# Patient Record
Sex: FEMALE | Race: WHITE | NOT HISPANIC OR LATINO | Employment: OTHER | URBAN - METROPOLITAN AREA
[De-identification: names, ages, dates, MRNs, and addresses within clinical notes are randomized per-mention and may not be internally consistent; named-entity substitution may affect disease eponyms.]

---

## 2017-01-06 DIAGNOSIS — N95.1 SYMPTOMATIC MENOPAUSAL OR FEMALE CLIMACTERIC STATES: Primary | ICD-10-CM

## 2017-01-06 NOTE — TELEPHONE ENCOUNTER
PROGESTERONE 100MG/G CREAM      Last Written Prescription Date: 06/20/16  Last Fill Quantity: 45g, # refills: 3  Last Office Visit with [facility], [facility] or [facility] prescribing provider: 06/20/16     BP Readings from Last 3 Encounters:   06/20/16 86/54   05/27/15 104/70   05/09/14 102/62     Date of last Breast Exam:

## 2017-01-06 NOTE — TELEPHONE ENCOUNTER
Routing refill request to provider for review/approval because:  BP out of range    Drug not on refill protocol

## 2017-02-07 ENCOUNTER — TELEPHONE (OUTPATIENT)
Dept: INTERNAL MEDICINE | Facility: CLINIC | Age: 62
End: 2017-02-07

## 2017-02-07 NOTE — TELEPHONE ENCOUNTER
I cannot say what is causing this problem without seeing her.    Based on the limited information below, it sounds like what a hemorrhoid or fissure would cause.  I can see her tommorrow morning at 800 if that works, otherwise use one of the MD or Same Day spots.   I cannot see her after 300 pm because I have to leave right away after the patient at 300 pm.

## 2017-02-07 NOTE — TELEPHONE ENCOUNTER
"Danika Hyman is a 61 year old female who calls with complaint of blood coating stool x2 today.    NURSING ASSESSMENT:  Description:  Had 2 formed stools today that appeared to be covered in bright red blood  Onset/duration:  today  Precip. factors:  Patient has had \"acid indigestion\" x 2 wks intermittently, abdominal fullness, burning.  Has been taking her Ranitidine as ordered twice daily.  Associated symptoms:  Patient denies black or burgundy stools, weakness, fatigue, dizziness, SOB.  Denies use of ASA/ibuprofen/naproxen .  Patient is due for colonoscopy this year, last one 2007 normal.  Improves/worsens symptoms:  nothing  Pain scale (0-10)   0/10  LMP/preg/breast feeding:  NA  Last exam/Treatment:  6/20/16  Allergies:   Allergies   Allergen Reactions     Codeine      Vomiting       MEDICATIONS:   Taking medication(s) as prescribed? Yes  Taking over the counter medication(s?) No  Any medication side effects? No significant side effects    Any barriers to taking medication(s) as prescribed?  No  Medication(s) improving/managing symptoms?  N/A  Medication reconciliation completed: Yes      NURSING PLAN: Routed to provider Yes    RECOMMENDED DISPOSITION:  Appt scheduled to see PMD within 48 hours with instructions to avoid alcohol, acidic foods, anti inflammatory meds.  Patient advised to stick to bland diet and to be seen in ER for increase in bleeding, black or burgundy stools, weakness, lightheadedness, SOB  Will comply with recommendation: Yes  If further questions/concerns or if symptoms do not improve, worsen or new symptoms develop, call your PCP or [facility] as soon as possible.      Guideline used:  Telephone Triage Protocols for Nurses, Fifth Edition, Evelin Chapa RN      "

## 2017-02-08 ENCOUNTER — OFFICE VISIT (OUTPATIENT)
Dept: INTERNAL MEDICINE | Facility: CLINIC | Age: 62
End: 2017-02-08
Payer: COMMERCIAL

## 2017-02-08 VITALS
TEMPERATURE: 97.6 F | SYSTOLIC BLOOD PRESSURE: 112 MMHG | RESPIRATION RATE: 15 BRPM | HEART RATE: 59 BPM | WEIGHT: 129.8 LBS | DIASTOLIC BLOOD PRESSURE: 68 MMHG | OXYGEN SATURATION: 99 % | BODY MASS INDEX: 22.27 KG/M2

## 2017-02-08 DIAGNOSIS — K62.5 BRBPR (BRIGHT RED BLOOD PER RECTUM): Primary | ICD-10-CM

## 2017-02-08 DIAGNOSIS — Z12.11 SCREENING FOR COLON CANCER: ICD-10-CM

## 2017-02-08 DIAGNOSIS — Z23 NEED FOR PROPHYLACTIC VACCINATION AND INOCULATION AGAINST INFLUENZA: ICD-10-CM

## 2017-02-08 DIAGNOSIS — Z12.31 VISIT FOR SCREENING MAMMOGRAM: ICD-10-CM

## 2017-02-08 PROCEDURE — 99213 OFFICE O/P EST LOW 20 MIN: CPT | Performed by: INTERNAL MEDICINE

## 2017-02-08 NOTE — PROGRESS NOTES
SUBJECTIVE:                                                    Danika Hyman is a 61 year old female who presents to clinic today for the following health issues:      Concern - blood in stool     Onset: 1 day    Description:   Patient states that yesterday she noticed bright red blood in her stool, not when she wipes. She states that she has no rectal pain, no abdominal pain.  No blood on toilet patper or stool this morning.      Intensity: mild    Progression of Symptoms:  improving    Accompanying Signs & Symptoms:  See above; did have a burning pain possibly from acid reflux, also a bloated feeling    Last colonoscopy 2007 was within normal limits        Previous history of similar problem:   Yes-hemorrhoids in the past    Precipitating factors:   Worsened by: n/a    Alleviating factors:  Improved by: n/a       Therapies Tried and outcome: n/a          Problem list and histories reviewed & adjusted, as indicated.  Additional history: as documented          Past Medical History:  ---------------------------  Past Medical History   Diagnosis Date     Esophageal reflux      Status post bariatric surgery 1982     vertical banded gastrosplasty at [facility]       Past Surgical History:  ---------------------------  Past Surgical History   Procedure Laterality Date     C nonspecific procedure       wisdom teeth extraction,abstracted     C nonspecific procedure       T&A,abstracted     C nonspecific procedure       2 natural childbirths,abstracted     C nonspecific procedure  1982     gastric stapling ast [facility]     Cataract iol, rt/lt  09/2016       Current Medications:  ---------------------------  Current Outpatient Prescriptions   Medication Sig Dispense Refill     PROGESTERONE 100MG/G CREAM Apply 0.5 g topically once per day onto the skin 45 g 3     ranitidine (ZANTAC) 150 MG tablet Take 1 tablet (150 mg) by mouth 2 times daily 180 tablet 2     Estradiol Cypionate POWD 0.5 mg daily Apply cream 0.5% to forearm once  daily 45 g 5     ESTRADIOL 0.1MG/GM CREAM Place 1 g vaginally once a week 42.5 g 3     omega 3 1000 MG CAPS Take 1 g by mouth daily       Progesterone Micronized 5 % CREA Place 0.25 tsp onto the skin daily. 30 g 5     ANUSOL-HC 2.5 % RE CREA apply B.I.D. or T.I.D. 1 Tube 4     CENTRUM SILVER OR 1 tablet daily       OCUVITE EXTRA OR 1 TABLET DAILY       VITAMIN B-12 500 MCG OR TABS 1 sublingual qday 30 5     CALCIUM PLUS TABS   OR 2 tabs q day       zolpidem (AMBIEN) 5 MG tablet Take 0.5-1 tablets (2.5-5 mg) by mouth nightly as needed for sleep 30 tablet 1       Allergies:  -------------  Allergies   Allergen Reactions     Codeine      Vomiting       Social History:  -------------------  Social History     Social History     Marital status:      Spouse name: N/A     Number of children: N/A     Years of education: N/A     Occupational History           Social History Main Topics     Smoking status: Never Smoker     Smokeless tobacco: Never Used     Alcohol use Yes      Comment: 4 glasses a week     Drug use: No     Sexual activity: Yes     Partners: Male     Other Topics Concern     Not on file     Social History Narrative       Family Medical History:  ------------------------------  Family History   Problem Relation Age of Onset     Hypertension Mother      Alcohol/Drug Mother      B:192 alive     Arthritis Father      B:       DIABETES Father      Respiratory Father      Cardiovascular Father      Family History Negative Sister      Family History Negative Sister      Family History Negative Brother      Lung Cancer Mother 89     lung cancer ;  age 91         ROS:  REVIEW OF SYSTEMS:    RESP: negative for cough, dyspnea, wheezing, hemoptysis  CV: negative for chest pain, palpitations, PND, MONIQUE, orthopnea; reports no changes in their ability to perform physical activity (from cardiovascular standpoint)  GI: negative for dysphagia, N/V, pain, melena, diarrhea and constipation  NEURO:  negative for numbness/tingling, paralysis, incoordination, or focal weakness     OBJECTIVE:                                                    /68  Pulse 59  Temp 97.6  F (36.4  C) (Oral)  Resp 15  Wt 129 lb 12.8 oz (58.9 kg)  LMP 04/17/2009  SpO2 99%  BMI 22.28 kg/m2     Exam deffered  Offered rectal exam and anoscopy, but patient declined.          ASSESSMENT/PLAN:                                                      (K62.5) BRBPR (bright red blood per rectum)  (primary encounter diagnosis)  Comment: based on description of sx, suspect hemorrhoid vs fissure  Has colonoscopy within normal limits in 2007.   Will send for colonoscopy anyway.   Plan:     (Z12.31) Visit for screening mammogram  Comment:   Plan: MA SCREENING DIGITAL BILAT - Future  (s+30)            (Z23) Need for prophylactic vaccination and inoculation against influenza  Comment:   Plan:     (Z12.11) Screening for colon cancer  Comment: due for colonoscopy, last one normal in 2007  Plan: GASTROENTEROLOGY ADULT REF PROCEDURE ONLY              See Patient Instructions    WES RING M.D., MD  [facility]       Spent greater than 15 minutes with pt , greater than 50% of time was educational and counseling.

## 2017-02-08 NOTE — PATIENT INSTRUCTIONS
"Anal Fissure or Internal Hemmrhoid:     *  Colonoscopy at [facility]  [place] [phone] (fax [phone])  , you should receive a call to schedule your colonoscopy this spring.     *  A small \"split\" in the anal canal, almost always a self limited event, healing within days.       *  No specific treatment needed no specific further testing needed unless the symptoms do not get better or if the symptoms change or worsen.      *  Normalize the bowel patterns by increasing the intake of fiber and maintaining adequate water intake (at least 6-8 glasses per day).    *  Take the fiber supplement of your choice:  Capsules, tablets, chewable tablets, powders, etc.   Find one that works best for you and take it regularly.    *  If the stools are consistently harder, then consider a stool softner such as colace (docusate) or senekot to help make the BMs larger and softer so they pass easier.      *  Preparation H (suppositories or cream) or similar over the counter product to help sooth these irritations.    *  Consider using Preparation H wipes after passing BMs as needed.  Do not overcleanse the rectal area as this will lead to more rectal irritation.     *  Sometimes sitting in a bathtub with Epsom salts for 10-15 minutes can help hemorrhoids as well.     *  If you continue to have troubles, then we may need to send you for further testing, but nothing specifically needed now.     "

## 2017-02-08 NOTE — NURSING NOTE
"Chief Complaint   Patient presents with     Rectal Problem       Initial /68 mmHg  Pulse 59  Temp(Src) 97.6  F (36.4  C) (Oral)  Resp 15  Wt 129 lb 12.8 oz (58.877 kg)  SpO2 99%  LMP 04/17/2009 Estimated body mass index is 22.27 kg/(m^2) as calculated from the following:    Height as of 6/20/16: 5' 4\" (1.626 m).    Weight as of this encounter: 129 lb 12.8 oz (58.877 kg).  Medication Reconciliation: complete    "

## 2017-02-08 NOTE — MR AVS SNAPSHOT
"              After Visit Summary   2/8/2017    Danika Hyman    MRN: [id number]           Patient Information     Date Of Birth          1955        Visit Information        Provider Department      2/8/2017 8:00 AM Rupesh Jacobson MD [facility]        Today's Diagnoses     BRBPR (bright red blood per rectum)    -  1     Visit for screening mammogram         Need for prophylactic vaccination and inoculation against influenza         Screening for colon cancer           Care Instructions    Anal Fissure or Internal Hemmrhoid:     *  Colonoscopy at [facility]  [place] [phone] (fax [phone])  , you should receive a call to schedule your colonoscopy this spring.     *  A small \"split\" in the anal canal, almost always a self limited event, healing within days.       *  No specific treatment needed no specific further testing needed unless the symptoms do not get better or if the symptoms change or worsen.      *  Normalize the bowel patterns by increasing the intake of fiber and maintaining adequate water intake (at least 6-8 glasses per day).    *  Take the fiber supplement of your choice:  Capsules, tablets, chewable tablets, powders, etc.   Find one that works best for you and take it regularly.    *  If the stools are consistently harder, then consider a stool softner such as colace (docusate) or senekot to help make the BMs larger and softer so they pass easier.      *  Preparation H (suppositories or cream) or similar over the counter product to help sooth these irritations.    *  Consider using Preparation H wipes after passing BMs as needed.  Do not overcleanse the rectal area as this will lead to more rectal irritation.     *  Sometimes sitting in a bathtub with Epsom salts for 10-15 minutes can help hemorrhoids as well.     *  If you continue to have troubles, then we may need to send you for further testing, but nothing specifically needed now. "           Follow-ups after your visit        Additional Services     GASTROENTEROLOGY ADULT REF PROCEDURE ONLY       Last Lab Result: CREATININE (mg/dL)       Date                     Value                 06/20/2016               0.60             ----------  Body mass index is 22.27 kg/(m^2).     Needed:  No  Language:  English    Patient will be contacted to schedule procedure.     Please be aware that coverage of these services is subject to the terms and limitations of your health insurance plan.  Call member services at your health plan with any benefit or coverage questions.  Any procedures must be performed at a [facility] facility OR coordinated by your clinic's referral office.    Please bring the following with you to your appointment:    (1) Any X-Rays, CTs or MRIs which have been performed.  Contact the facility where they were done to arrange for  prior to your scheduled appointment.    (2) List of current medications   (3) This referral request   (4) Any documents/labs given to you for this referral                  Future tests that were ordered for you today     Open Future Orders        Priority Expected Expires Ordered    MA SCREENING DIGITAL BILAT - Future  (s+30) Routine  2/8/2018 2/8/2017            Who to contact     If you have questions or need follow up information about today's clinic visit or your schedule please contact [facility] directly at [phone].  Normal or non-critical lab and imaging results will be communicated to you by MyChart, letter or phone within 4 business days after the clinic has received the results. If you do not hear from us within 7 days, please contact the clinic through MyChart or phone. If you have a critical or abnormal lab result, we will notify you by phone as soon as possible.  Submit refill requests through [employer] or call your pharmacy and they will forward the refill request to us. Please allow 3 business days  for your refill to be completed.          Additional Information About Your Visit        [employer] Information     [employer] gives you secure access to your electronic health record. If you see a primary care provider, you can also send messages to your care team and make appointments. If you have questions, please call your primary care clinic.  If you do not have a primary care provider, please call [phone] and they will assist you.        Care EveryWhere ID     This is your Care EveryWhere ID. This could be used by other organizations to access your [place] medical records  [id number]        Your Vitals Were     Pulse Temperature Respirations Pulse Oximetry Last Period       59 97.6  F (36.4  C) (Oral) 15 99% 04/17/2009        Blood Pressure from Last 3 Encounters:   02/08/17 112/68   06/20/16 86/54   05/27/15 104/70    Weight from Last 3 Encounters:   02/08/17 129 lb 12.8 oz (58.877 kg)   06/20/16 127 lb 14.4 oz (58.015 kg)   05/27/15 121 lb 9.6 oz (55.157 kg)              We Performed the Following     GASTROENTEROLOGY ADULT REF PROCEDURE ONLY        Primary Care Provider Office Phone # Fax #    Rupesh Jacobson [phone]       [facility] [address] [place] [id number]        Thank you!     Thank you for choosing [facility]  for your care. Our goal is always to provide you with excellent care. Hearing back from our patients is one way we can continue to improve our services. Please take a few minutes to complete the written survey that you may receive in the mail after your visit with us. Thank you!             Your Updated Medication List - Protect others around you: Learn how to safely use, store and throw away your medicines at www.disposemymeds.org.          This list is accurate as of: 2/8/17  8:35 AM.  Always use your most recent med list.                   Brand Name Dispense Instructions for use    ANUSOL-HC 2.5 % cream   Generic  drug:  hydrocortisone     1 Tube    apply B.I.D. or T.I.D.       CALCIUM PLUS TABS   OR      2 tabs q day       cyanocolbalamin 500 MCG tablet    vitamin  B-12    30    1 sublingual qday       ESTRADIOL 0.1MG/GM CREAM     42.5 g    Place 1 g vaginally once a week       Estradiol Cypionate Powd     45 g    0.5 mg daily Apply cream 0.5% to forearm once daily       * OCUVITE EXTRA PO      1 TABLET DAILY       * CENTRUM SILVER PO      1 tablet daily       omega 3 1000 MG Caps      Take 1 g by mouth daily       PROGESTERONE 100MG/G CREAM     45 g    Apply 0.5 g topically once per day onto the skin       Progesterone Micronized 5 % Crea     30 g    Place 0.25 tsp onto the skin daily.       ranitidine 150 MG tablet    ZANTAC    180 tablet    Take 1 tablet (150 mg) by mouth 2 times daily       zolpidem 5 MG tablet    AMBIEN    30 tablet    Take 0.5-1 tablets (2.5-5 mg) by mouth nightly as needed for sleep       * Notice:  This list has 2 medication(s) that are the same as other medications prescribed for you. Read the directions carefully, and ask your doctor or other care provider to review them with you.

## 2017-02-25 ENCOUNTER — RADIANT APPOINTMENT (OUTPATIENT)
Dept: MAMMOGRAPHY | Facility: CLINIC | Age: 62
End: 2017-02-25
Attending: INTERNAL MEDICINE
Payer: COMMERCIAL

## 2017-02-25 DIAGNOSIS — Z12.31 VISIT FOR SCREENING MAMMOGRAM: ICD-10-CM

## 2017-02-25 PROCEDURE — G0202 SCR MAMMO BI INCL CAD: HCPCS | Mod: TC

## 2017-03-22 ENCOUNTER — OFFICE VISIT (OUTPATIENT)
Dept: OBGYN | Facility: CLINIC | Age: 62
End: 2017-03-22
Payer: COMMERCIAL

## 2017-03-22 VITALS
WEIGHT: 129 LBS | DIASTOLIC BLOOD PRESSURE: 60 MMHG | HEART RATE: 68 BPM | SYSTOLIC BLOOD PRESSURE: 96 MMHG | BODY MASS INDEX: 22.14 KG/M2

## 2017-03-22 DIAGNOSIS — R68.82 DECREASED LIBIDO: ICD-10-CM

## 2017-03-22 DIAGNOSIS — N95.1 SYMPTOMATIC MENOPAUSAL OR FEMALE CLIMACTERIC STATES: Primary | ICD-10-CM

## 2017-03-22 PROCEDURE — 99201 ZZC OFFICE/OUTPT VISIT, NEW, LEVEL I: CPT | Performed by: OBSTETRICS & GYNECOLOGY

## 2017-03-22 RX ORDER — ESTRADIOL 10 UG/1
10 INSERT VAGINAL
Qty: 12 TABLET | Refills: 3 | Status: SHIPPED | OUTPATIENT
Start: 2017-03-23 | End: 2017-04-14

## 2017-03-22 NOTE — MR AVS SNAPSHOT
After Visit Summary   3/22/2017    Danika Hyman    MRN: [id number]           Patient Information     Date Of Birth          1955        Visit Information        Provider Department      3/22/2017 5:15 PM Jackson Johnson MD [facility] [place]        Today's Diagnoses     Symptomatic menopausal or female climacteric states    -  1    Decreased libido           Follow-ups after your visit        Follow-up notes from your care team     Return if symptoms worsen or fail to improve.      Who to contact     If you have questions or need follow up information about today's clinic visit or your schedule please contact [facility] directly at [phone].  Normal or non-critical lab and imaging results will be communicated to you by MyChart, letter or phone within 4 business days after the clinic has received the results. If you do not hear from us within 7 days, please contact the clinic through [employer]hart or phone. If you have a critical or abnormal lab result, we will notify you by phone as soon as possible.  Submit refill requests through [employer] or call your pharmacy and they will forward the refill request to us. Please allow 3 business days for your refill to be completed.          Additional Information About Your Visit        MyChart Information     [employer] gives you secure access to your electronic health record. If you see a primary care provider, you can also send messages to your care team and make appointments. If you have questions, please call your primary care clinic.  If you do not have a primary care provider, please call [phone] and they will assist you.        Care EveryWhere ID     This is your Care EveryWhere ID. This could be used by other organizations to access your [place] medical records  [id number]        Your Vitals Were     Pulse Last Period BMI (Body Mass Index)             68 04/17/2009 22.14 kg/m2          Blood Pressure from Last 3 Encounters:    03/22/17 96/60   02/08/17 112/68   06/20/16 (!) 86/54    Weight from Last 3 Encounters:   03/22/17 129 lb (58.5 kg)   02/08/17 129 lb 12.8 oz (58.9 kg)   06/20/16 127 lb 14.4 oz (58 kg)              Today, you had the following     No orders found for display         Today's Medication Changes          These changes are accurate as of: 3/22/17  6:38 PM.  If you have any questions, ask your nurse or doctor.               Start taking these medicines.        Dose/Directions    estradiol 10 MCG Tabs vaginal tablet   Commonly known as:  VAGIFEM   Used for:  Symptomatic menopausal or female climacteric states   Started by:  Jackson Johnson MD        Dose:  10 mcg   Start taking on:  3/23/2017   Place 1 tablet (10 mcg) vaginally three times a week   Quantity:  12 tablet   Refills:  3       TESTOSTERONE 2 MG/GM CREAM   Used for:  Decreased libido   Started by:  Jackson Johnson MD        Apply 1/8 teaspoon to skin 2-3 x week   Quantity:  120 g   Refills:  3            Where to get your medicines      These medications were sent to [id number]  [facility] - [address] [facility]  [address], [place] [address]     Phone:  [phone]     estradiol 10 MCG Tabs vaginal tablet         Some of these will need a paper prescription and others can be bought over the counter.  Ask your nurse if you have questions.     Bring a paper prescription for each of these medications     TESTOSTERONE 2 MG/GM CREAM                Primary Care Provider Office Phone # Fax #    Rupesh Jacobson [phone]       [facility] [address] [facility] [address]        Thank you!     Thank you for choosing [facility] [place]  for your care. Our goal is always to provide you with excellent care. Hearing back from our patients is one way we can continue to improve our services. Please take a few minutes to complete the written survey that you may  receive in the mail after your visit with us. Thank you!             Your Updated Medication List - Protect others around you: Learn how to safely use, store and throw away your medicines at www.disposemymeds.org.          This list is accurate as of: 3/22/17  6:38 PM.  Always use your most recent med list.                   Brand Name Dispense Instructions for use    ANUSOL-HC 2.5 % cream   Generic drug:  hydrocortisone     1 Tube    apply B.I.D. or T.I.D.       CALCIUM PLUS TABS   OR      2 tabs q day       cyanocolbalamin 500 MCG tablet    vitamin  B-12    30    1 sublingual qday       ESTRADIOL 0.1MG/GM CREAM     42.5 g    Place 1 g vaginally once a week       estradiol 10 MCG Tabs vaginal tablet   Start taking on:  3/23/2017    VAGIFEM    12 tablet    Place 1 tablet (10 mcg) vaginally three times a week       Estradiol Cypionate Powd     45 g    0.5 mg daily Apply cream 0.5% to forearm once daily       * OCUVITE EXTRA PO      1 TABLET DAILY       * CENTRUM SILVER PO      1 tablet daily       omega 3 1000 MG Caps      Take 1 g by mouth daily       PROGESTERONE 100MG/G CREAM     45 g    Apply 0.5 g topically once per day onto the skin       Progesterone Micronized 5 % Crea     30 g    Place 0.25 tsp onto the skin daily.       ranitidine 150 MG tablet    ZANTAC    180 tablet    Take 1 tablet (150 mg) by mouth 2 times daily       TESTOSTERONE 2 MG/GM CREAM     120 g    Apply 1/8 teaspoon to skin 2-3 x week       zolpidem 5 MG tablet    AMBIEN    30 tablet    Take 0.5-1 tablets (2.5-5 mg) by mouth nightly as needed for sleep       * Notice:  This list has 2 medication(s) that are the same as other medications prescribed for you. Read the directions carefully, and ask your doctor or other care provider to review them with you.

## 2017-03-22 NOTE — NURSING NOTE
"Chief Complaint   Patient presents with     Consult     discuss low libido and vaginal changes - currently using some progesterone creams       Initial BP 96/60 (BP Location: Right arm, Patient Position: Chair, Cuff Size: Adult Regular)  Pulse 68  Wt 129 lb (58.5 kg)  LMP 04/17/2009  BMI 22.14 kg/m2 Estimated body mass index is 22.14 kg/(m^2) as calculated from the following:    Height as of 6/20/16: 5' 4\" (1.626 m).    Weight as of this encounter: 129 lb (58.5 kg).  Medication Reconciliation: complete     Nurse assisted visit.  Monica Green MA.      "

## 2017-03-22 NOTE — PROGRESS NOTES
SUBJECTIVE:  Danika Hyman is an 62 year old  P:2 postmenopausal woman who presents for discussion of symptoms of climacteric      -menopause about 10 years ago      -using compounded estrogen and progesterone since menopause       -vaginal estrogen cream added later when vaginal dryness became manifest          -estrogen cream reduced to 1/2 applicator per week and has started having pain with intercourse      -also note decreased libido        Past Medical History:   Diagnosis Date     Esophageal reflux      Status post bariatric surgery 1982    vertical banded gastrosplasty at [facility]       Family History   Problem Relation Age of Onset     Arthritis Father      B:       DIABETES Father      Respiratory Father      Cardiovascular Father      Hypertension Mother      Alcohol/Drug Mother      B:192 alive     Lung Cancer Mother 89     lung cancer ;  age 91     Family History Negative Sister      Family History Negative Sister      Family History Negative Brother        Past Surgical History:   Procedure Laterality Date     C NONSPECIFIC PROCEDURE      wisdom teeth extraction,abstracted     C NONSPECIFIC PROCEDURE      T&A,abstracted     C NONSPECIFIC PROCEDURE      2 natural childbirths,abstracted     C NONSPECIFIC PROCEDURE      gastric stapling ast [facility]     CATARACT IOL, RT/LT  2016       Current Outpatient Prescriptions   Medication     [START ON 3/23/2017] estradiol (VAGIFEM) 10 MCG TABS vaginal tablet     TESTOSTERONE 2 MG/GM CREAM     PROGESTERONE 100MG/G CREAM     ranitidine (ZANTAC) 150 MG tablet     Estradiol Cypionate POWD     ESTRADIOL 0.1MG/GM CREAM     omega 3 1000 MG CAPS     CENTRUM SILVER OR     OCUVITE EXTRA OR     VITAMIN B-12 500 MCG OR TABS     CALCIUM PLUS TABS   OR     zolpidem (AMBIEN) 5 MG tablet     Progesterone Micronized 5 % CREA     ANUSOL-HC 2.5 % RE CREA     No current facility-administered medications for this visit.      Allergies   Allergen Reactions      Codeine      Vomiting       Social History   Substance Use Topics     Smoking status: Never Smoker     Smokeless tobacco: Never Used     Alcohol use Yes      Comment: 4 glasses a week       ROS:  C: NEGATIVE for fever, chills  E: NEGATIVE for vision changes   R: NEGATIVE for significant cough or SOB  CV: NEGATIVE for chest pain, palpitations   GI: NEGATIVE for nausea, abdominal pain, heartburn, or change in bowel habits  : as above    OBJECTIVE:  Exam:  GENERAL APPEARANCE: healthy, alert and no distress      ASSESSMENT/PLAN:     Vaginal dryness/pain with intercourse        -recommend increasing dose of estrogen to 1/2 applicator 3 x/week for 2-3 weeks then           -twice a week             -or-        -Vagifem 3 x/week for 2-3 weeks then          -twice a week       Decreased libido        -recommend application of 1/8 teaspoon to skin 2-3 x/week    Risks/benefits and use of vaginal estrogen and testosterone reviewed with patient over 15 minutes        (N95.1) Symptomatic menopausal or female climacteric states  (primary encounter diagnosis)  Plan: estradiol (VAGIFEM) 10 MCG TABS vaginal tablet       (R68.82) Decreased libido  Plan: TESTOSTERONE 2 MG/GM CREAM             PE: reviewed health maintenance including diet, regular exercise and periodic exams.  Health Maintenance   Topic Date Due     INFLUENZA VACCINE (SYSTEM ASSIGNED)  09/01/2017     COLON CANCER SCREEN (SYSTEM ASSIGNED)  11/10/2017     MAMMO Q1 YR INBASKET MESSAGE  02/27/2018     PAP Q5 YEARS  05/09/2019     HPV Q5 YEARS (Complete with PAP)  05/09/2019     ADVANCE DIRECTIVE PLANNING Q5 YRS (NO INBASKET)  05/09/2019     LIPID SCREEN Q5 YR FEMALE (SYSTEM ASSIGNED)  06/20/2021     TETANUS Q10 YR  06/20/2026     HEPATITIS C SCREENING  Completed

## 2017-03-27 RX ORDER — ESTRADIOL 100 %
POWDER (GRAM) MISCELLANEOUS
Refills: 0 | OUTPATIENT
Start: 2017-03-27

## 2017-03-27 NOTE — TELEPHONE ENCOUNTER
Spoke with pharmacy.  Script was filled by different doctor on 3/23/17 and patient was given 3 refills.

## 2017-04-07 ENCOUNTER — TELEPHONE (OUTPATIENT)
Dept: NURSING | Facility: CLINIC | Age: 62
End: 2017-04-07

## 2017-04-07 NOTE — TELEPHONE ENCOUNTER
It pt still suppose to be on the estradiol cream?  Was recently prescribed Vagifem tablets from Dr. Johnson.  Please let pharmacy know.

## 2017-04-08 ENCOUNTER — MYC REFILL (OUTPATIENT)
Dept: INTERNAL MEDICINE | Facility: CLINIC | Age: 62
End: 2017-04-08

## 2017-04-08 DIAGNOSIS — N95.1 SYMPTOMATIC MENOPAUSAL OR FEMALE CLIMACTERIC STATES: ICD-10-CM

## 2017-04-10 RX ORDER — ESTRADIOL CYPIONATE
0.5 POWDER (GRAM) MISCELLANEOUS DAILY
Qty: 45 G | Refills: 5 | Status: SHIPPED | OUTPATIENT
Start: 2017-04-10 | End: 2017-12-27

## 2017-04-10 NOTE — TELEPHONE ENCOUNTER
Message from [employer]:  Original authorizing provider: MD Danika Keenan would like a refill of the following medications:  Estradiol Cypionate POWD [Rupesh Jacobson MD]    Preferred pharmacy: [facility] [address] [place] [address] [id number] AT [place] [address]    Comment:  Please send the approval to [facility] on [facility] in [place] rather than to [place] pharmacy. [facility] in [place] said they had sent a fax requesting a renewal. Thank you Danika Hyman [phone] or [phone]

## 2017-04-14 DIAGNOSIS — N95.1 SYMPTOMATIC MENOPAUSAL OR FEMALE CLIMACTERIC STATES: ICD-10-CM

## 2017-04-14 RX ORDER — ESTRADIOL 10 UG/1
10 INSERT VAGINAL
Qty: 12 TABLET | Refills: 3 | Status: SHIPPED | OUTPATIENT
Start: 2017-04-15 | End: 2017-08-28

## 2017-04-14 NOTE — TELEPHONE ENCOUNTER
estradiol (VAGIFEM) 10 MCG TABS vaginal tablet      Last Written Prescription Date: 03/23/17  Last Fill Quantity: 12 tab, # refills: 3  Last Office Visit with [facility], [facility] or [facility] prescribing provider: 02/08/17       BP Readings from Last 3 Encounters:   03/22/17 96/60   02/08/17 112/68   06/20/16 (!) 86/54     Date of last Breast Exam: ?

## 2017-05-04 ENCOUNTER — RADIANT APPOINTMENT (OUTPATIENT)
Dept: BONE DENSITY | Facility: CLINIC | Age: 62
End: 2017-05-04
Attending: INTERNAL MEDICINE
Payer: COMMERCIAL

## 2017-05-04 DIAGNOSIS — M85.80 OSTEOPENIA: ICD-10-CM

## 2017-05-04 PROCEDURE — 77080 DXA BONE DENSITY AXIAL: CPT | Performed by: FAMILY MEDICINE

## 2017-05-09 ENCOUNTER — TELEPHONE (OUTPATIENT)
Dept: INTERNAL MEDICINE | Facility: CLINIC | Age: 62
End: 2017-05-09

## 2017-05-09 DIAGNOSIS — M81.0 OSTEOPOROSIS: Primary | ICD-10-CM

## 2017-05-09 NOTE — TELEPHONE ENCOUNTER
Please call the patient.  I will send [employer] message about results, but I want to make sure that she gets the message.   The DEXA scan results came back to me yesterday.   The bone density has worsened since the last scan and now is just into the osteoporosis range and will likely need a medication to help this.   I would like her to see Endocrinology (Dr. Reyna at either [place] or [facility] locations) to evaluate this further and to consider the best treatment options.    Her situation is complicated by the prior history of bariatric surgery and more recent significant weight loss from 5 years ago.  I entered a referral.      [facility]: [facility] [phone]   [email or web address]  [facility]: [facility] [phone]   [email or web address]

## 2017-05-22 ENCOUNTER — TRANSFERRED RECORDS (OUTPATIENT)
Dept: HEALTH INFORMATION MANAGEMENT | Facility: CLINIC | Age: 62
End: 2017-05-22

## 2017-05-24 ENCOUNTER — MYC MEDICAL ADVICE (OUTPATIENT)
Dept: OBGYN | Facility: CLINIC | Age: 62
End: 2017-05-24

## 2017-05-24 NOTE — TELEPHONE ENCOUNTER
See [employer] message.  Any alternative you would like pt to try?    Marti Brar R.N.  [facility]

## 2017-06-05 ENCOUNTER — TRANSFERRED RECORDS (OUTPATIENT)
Dept: HEALTH INFORMATION MANAGEMENT | Facility: CLINIC | Age: 62
End: 2017-06-05

## 2017-06-12 DIAGNOSIS — N95.1 SYMPTOMATIC MENOPAUSAL OR FEMALE CLIMACTERIC STATES: ICD-10-CM

## 2017-06-12 NOTE — TELEPHONE ENCOUNTER
progesterone      Last Written Prescription Date: 01/06/17  Last Fill Quantity: 45g, # refills: 3  Last Office Visit with [facility], [facility] or [facility] prescribing provider: 02/08/17       BP Readings from Last 3 Encounters:   03/22/17 96/60   02/08/17 112/68   06/20/16 (!) 86/54     Date of last Breast Exam: 02/25/17

## 2017-06-20 DIAGNOSIS — Z92.29 PERSONAL HISTORY OF DRUG THERAPY: ICD-10-CM

## 2017-06-20 DIAGNOSIS — M81.0 OSTEOPOROSIS: Primary | ICD-10-CM

## 2017-06-20 RX ORDER — ZOLEDRONIC ACID 5 MG/100ML
5 INJECTION, SOLUTION INTRAVENOUS ONCE
Status: CANCELLED
Start: 2017-06-23 | End: 2017-06-23

## 2017-06-23 ENCOUNTER — INFUSION THERAPY VISIT (OUTPATIENT)
Dept: INFUSION THERAPY | Facility: CLINIC | Age: 62
End: 2017-06-23
Attending: INTERNAL MEDICINE
Payer: COMMERCIAL

## 2017-06-23 VITALS
DIASTOLIC BLOOD PRESSURE: 53 MMHG | HEART RATE: 62 BPM | RESPIRATION RATE: 16 BRPM | SYSTOLIC BLOOD PRESSURE: 88 MMHG | TEMPERATURE: 97.5 F

## 2017-06-23 DIAGNOSIS — M81.0 OSTEOPOROSIS: ICD-10-CM

## 2017-06-23 DIAGNOSIS — Z92.29 PERSONAL HISTORY OF DRUG THERAPY: Primary | ICD-10-CM

## 2017-06-23 PROCEDURE — 96365 THER/PROPH/DIAG IV INF INIT: CPT

## 2017-06-23 PROCEDURE — 25000128 H RX IP 250 OP 636: Performed by: INTERNAL MEDICINE

## 2017-06-23 RX ORDER — ZOLEDRONIC ACID 5 MG/100ML
5 INJECTION, SOLUTION INTRAVENOUS ONCE
Status: COMPLETED | OUTPATIENT
Start: 2017-06-23 | End: 2017-06-23

## 2017-06-23 RX ORDER — ZOLEDRONIC ACID 5 MG/100ML
5 INJECTION, SOLUTION INTRAVENOUS ONCE
Status: CANCELLED
Start: 2017-06-23 | End: 2017-06-23

## 2017-06-23 RX ADMIN — ZOLEDRONIC ACID 5 MG: 5 INJECTION, SOLUTION INTRAVENOUS at 15:49

## 2017-06-23 RX ADMIN — SODIUM CHLORIDE 250 ML: 9 INJECTION, SOLUTION INTRAVENOUS at 15:49

## 2017-06-23 ASSESSMENT — PAIN SCALES - GENERAL: PAINLEVEL: NO PAIN (0)

## 2017-06-23 NOTE — PROGRESS NOTES
Infusion Nursing Note:  Danika Hyman presents today for first time dose of Reclast  Patient seen by provider today: No   present during visit today: Not Applicable.    Note: new medication teaching done. .    Intravenous Access:  Peripheral IV placed.    Treatment Conditions:  Not Applicable. Labs done prior to appointment      Post Infusion Assessment:  Patient tolerated infusion without incident.  Patient observed for 20 minutes post first dose per protocol.  Site patent and intact, free from redness, edema or discomfort.  Access discontinued per protocol.    Discharge Plan:   Discharge instructions reviewed with: Patient.  Patient and/or family verbalized understanding of discharge instructions and all questions answered.  AVS to patient via [employer].  Patient will return 1 year for next appointment.   Patient discharged in stable condition accompanied by: self.    Gena Woody RN

## 2017-07-21 ENCOUNTER — MYC REFILL (OUTPATIENT)
Dept: INTERNAL MEDICINE | Facility: CLINIC | Age: 62
End: 2017-07-21

## 2017-07-21 DIAGNOSIS — N95.1 SYMPTOMATIC MENOPAUSAL OR FEMALE CLIMACTERIC STATES: ICD-10-CM

## 2017-07-21 NOTE — TELEPHONE ENCOUNTER
Message from [employer]:  Original authorizing provider: MD Danika Keenan would like a refill of the following medications:  PROGESTERONE 100MG/G CREAM [Rupesh Jacobson MD]    Preferred pharmacy: [facility] [address] [place] [address] [id number] AT [place] [address]    Comment:  Dr. Jacobson [facility] told me today that they asked to refill the Progesterone cream and were told that this is not prescribed for me - must be a mix-up of some kind, I imagine. If it would please be clarified and renewed with [facility]. Thank you. Danika Hyman

## 2017-07-31 ENCOUNTER — TRANSFERRED RECORDS (OUTPATIENT)
Dept: HEALTH INFORMATION MANAGEMENT | Facility: CLINIC | Age: 62
End: 2017-07-31

## 2017-08-21 DIAGNOSIS — K21.9 GASTROESOPHAGEAL REFLUX DISEASE WITHOUT ESOPHAGITIS: ICD-10-CM

## 2017-08-21 NOTE — TELEPHONE ENCOUNTER
ranitidine      Last Written Prescription Date: 08/17/16  Last Fill Quantity: 180,  # refills: 2   Last Office Visit with [facility], [facility] or [facility] prescribing provider: 02/08/17

## 2017-08-28 DIAGNOSIS — N95.1 SYMPTOMATIC MENOPAUSAL OR FEMALE CLIMACTERIC STATES: ICD-10-CM

## 2017-08-28 RX ORDER — ESTRADIOL 10 UG/1
10 INSERT VAGINAL
Qty: 36 TABLET | Refills: 3 | Status: SHIPPED | OUTPATIENT
Start: 2017-08-29 | End: 2017-11-22

## 2017-08-28 NOTE — TELEPHONE ENCOUNTER
Refill request received for Vagifem.  Last office visit 3/22/17  ASSESSMENT/PLAN:     Vaginal dryness/pain with intercourse        -recommend increasing dose of estrogen to 1/2 applicator 3 x/week for 2-3 weeks then           -twice a week             -or-        -Vagifem 3 x/week for 2-3 weeks then          -twice a week    Please check sig and provide appropriate refills.  Thank you  Ronda Trinidad RN

## 2017-10-04 DIAGNOSIS — N95.1 SYMPTOMATIC MENOPAUSAL OR FEMALE CLIMACTERIC STATES: ICD-10-CM

## 2017-10-04 NOTE — TELEPHONE ENCOUNTER
progesterone      Last Written Prescription Date: 07/21/17  Last Fill Quantity: 45g, # refills: 1  Last Office Visit with [facility], [facility] or [facility] prescribing provider: 02/08/17       BP Readings from Last 3 Encounters:   06/23/17 (!) 88/53   03/22/17 96/60   02/08/17 112/68     Date of last Breast Exam: 02/25/17

## 2017-10-05 DIAGNOSIS — R68.82 DECREASED LIBIDO: ICD-10-CM

## 2017-10-05 NOTE — TELEPHONE ENCOUNTER
testosterone      Last Written Prescription Date:  3/22/17  Last Fill Quantity: 120 g,   # refills: 3  Last Office Visit with [facility], [facility] or [facility] prescribing provider: 3/22/17  Future Office visit:

## 2017-10-05 NOTE — TELEPHONE ENCOUNTER
Pending Prescriptions:                       Disp   Refills    TESTOSTERONE 2 MG/GM CREAM                120 g  3            Sig: Apply 1/8 teaspoon to skin 2-3 x week          Last Written Prescription Date:  3/22/17  Last Fill Quantity: 120 g,   # refills: 3  Last Office Visit with [facility] or [facility] prescribing provider: 3/22/17  Future Office visit:       Routing refill request to provider for review/approval because:  Drug not on the [facility] or [facility] refill protocol or controlled substance    Marti ALEXANDRE R.N.  [facility]

## 2017-10-13 NOTE — TELEPHONE ENCOUNTER
"Dr. Solano, could you please redo the refill on the testosterone? I see that this was ok'd on 10/5/17, but I do not think that it got faxed to the pharmacy.   It will local print and needs to be hand faxed to the pharmacy.     The pharmacy is faxing us a \"2nd\" refill request.     If able to do it, please have the nurse fax it to the pharmacy listed (it will be on the bottom of the printed rx).       Thanks.   Magali"

## 2017-11-17 DIAGNOSIS — N95.1 MENOPAUSAL VAGINAL DRYNESS: ICD-10-CM

## 2017-11-17 DIAGNOSIS — N95.1 SYMPTOMATIC MENOPAUSAL OR FEMALE CLIMACTERIC STATES: ICD-10-CM

## 2017-11-17 DIAGNOSIS — Z78.0 POST-MENOPAUSAL: ICD-10-CM

## 2017-11-21 NOTE — TELEPHONE ENCOUNTER
estrodial      Last Written Prescription Date:  8/29/17  Last Fill Quantity: 36,   # refills: 3  Future Office visit:       Routing refill request to provider for review/approval because:  Drug not on the [facility], [facility] or [facility] refill protocol or controlled substance        progesterone      Last Written Prescription Date:  10/4/17  Last Fill Quantity: 45,   # refills: 0  Future Office visit:       Routing refill request to provider for review/approval because:  Drug not on the [facility], [facility] or [facility] refill protocol or controlled substance

## 2017-11-22 RX ORDER — ESTRADIOL 10 UG/1
10 INSERT VAGINAL
Qty: 36 TABLET | Refills: 3 | Status: SHIPPED | OUTPATIENT
Start: 2017-11-23 | End: 2019-02-20

## 2017-11-22 NOTE — TELEPHONE ENCOUNTER
Please call the pharmacy.   The Estrodial requested was actually the Vagifem vaginal tablets, not the cream.     RX for this sent.     She should remain on the Estradiol cream 0.5 cream as she has been doing.

## 2017-11-22 NOTE — TELEPHONE ENCOUNTER
Message from [employer]: estradiol 0.1mg/gm cream, just want to verify this is a dose reduction from estradiol 0.5/gm cream?

## 2017-12-26 DIAGNOSIS — N95.1 SYMPTOMATIC MENOPAUSAL OR FEMALE CLIMACTERIC STATES: ICD-10-CM

## 2017-12-27 DIAGNOSIS — N95.1 SYMPTOMATIC MENOPAUSAL OR FEMALE CLIMACTERIC STATES: ICD-10-CM

## 2017-12-27 RX ORDER — ESTRADIOL CYPIONATE
0.5 POWDER (GRAM) MISCELLANEOUS DAILY
Qty: 45 G | Refills: 2 | Status: SHIPPED | OUTPATIENT
Start: 2017-12-27 | End: 2018-03-26

## 2017-12-27 NOTE — TELEPHONE ENCOUNTER
estradiol cypionate cream      Last Written Prescription Date: 04/10/17  Last Fill Quantity: 45g,  # refills: 5   Last Office Visit with [facility], [facility] or [facility] prescribing provider: 02/08/17

## 2018-01-25 DIAGNOSIS — N95.1 SYMPTOMATIC MENOPAUSAL OR FEMALE CLIMACTERIC STATES: ICD-10-CM

## 2018-01-29 ENCOUNTER — OFFICE VISIT (OUTPATIENT)
Dept: INTERNAL MEDICINE | Facility: CLINIC | Age: 63
End: 2018-01-29
Payer: COMMERCIAL

## 2018-01-29 VITALS
TEMPERATURE: 98 F | BODY MASS INDEX: 22.62 KG/M2 | WEIGHT: 132.5 LBS | HEIGHT: 64 IN | HEART RATE: 68 BPM | RESPIRATION RATE: 20 BRPM | SYSTOLIC BLOOD PRESSURE: 106 MMHG | DIASTOLIC BLOOD PRESSURE: 72 MMHG

## 2018-01-29 DIAGNOSIS — L98.9 SKIN LESION OF LEFT ARM: Primary | ICD-10-CM

## 2018-01-29 PROCEDURE — 99213 OFFICE O/P EST LOW 20 MIN: CPT | Performed by: INTERNAL MEDICINE

## 2018-01-29 NOTE — MR AVS SNAPSHOT
"              After Visit Summary   1/29/2018    Danika Hyman    MRN: [id number]           Patient Information     Date Of Birth          1955        Visit Information        Provider Department      1/29/2018 3:40 PM Rupesh Jacobson MD [facility]        Today's Diagnoses     Skin lesion of left arm    -  1      Care Instructions    Continue all medications at the same doses.  Contact your usual pharmacy if you need refills.           Follow-ups after your visit        Who to contact     If you have questions or need follow up information about today's clinic visit or your schedule please contact [facility] directly at [phone].  Normal or non-critical lab and imaging results will be communicated to you by MyChart, letter or phone within 4 business days after the clinic has received the results. If you do not hear from us within 7 days, please contact the clinic through MyChart or phone. If you have a critical or abnormal lab result, we will notify you by phone as soon as possible.  Submit refill requests through [employer] or call your pharmacy and they will forward the refill request to us. Please allow 3 business days for your refill to be completed.          Additional Information About Your Visit        MyChart Information     [employer] gives you secure access to your electronic health record. If you see a primary care provider, you can also send messages to your care team and make appointments. If you have questions, please call your primary care clinic.  If you do not have a primary care provider, please call [phone] and they will assist you.        Care EveryWhere ID     This is your Care EveryWhere ID. This could be used by other organizations to access your [place] medical records  [id number]        Your Vitals Were     Pulse Temperature Respirations Height Last Period Breastfeeding?    68 98  F (36.7  C) (Oral) 20 5' 3.75\" (1.619 m) " 04/17/2009 No    BMI (Body Mass Index)                   22.92 kg/m2            Blood Pressure from Last 3 Encounters:   01/29/18 106/72   06/23/17 (!) 88/53   03/22/17 96/60    Weight from Last 3 Encounters:   01/29/18 132 lb 8 oz (60.1 kg)   03/22/17 129 lb (58.5 kg)   02/08/17 129 lb 12.8 oz (58.9 kg)              Today, you had the following     No orders found for display       Primary Care Provider Office Phone # Fax #    Rupesh Jacobson [phone]       [address] [facility] [address]        Equal Access to Services     RUTHIE MCFARLANE : Hadii parul skyo Sojanay, waaxda romel, qaybta kaalmada adeyenyyada, sari woody. So [facility] [phone].    ATENCIÓN: Si habla español, tiene a gannon disposición servicios gratuitos de asistencia lingüística. Llame al [phone].    We comply with applicable federal civil rights laws and [place] laws. We do not discriminate on the basis of race, color, national origin, age, disability, sex, sexual orientation, or gender identity.            Thank you!     Thank you for choosing [facility]  for your care. Our goal is always to provide you with excellent care. Hearing back from our patients is one way we can continue to improve our services. Please take a few minutes to complete the written survey that you may receive in the mail after your visit with us. Thank you!             Your Updated Medication List - Protect others around you: Learn how to safely use, store and throw away your medicines at www.disposemymeds.org.          This list is accurate as of 1/29/18 11:59 PM.  Always use your most recent med list.                   Brand Name Dispense Instructions for use Diagnosis    ANUSOL-HC 2.5 % cream   Generic drug:  hydrocortisone     1 Tube    apply B.I.D. or T.I.D.    External hemorrhoids without mention of complication       CALCIUM PLUS TABS   OR      2 tabs q day         cyanocolbalamin 500 MCG tablet    vitamin  B-12    30    1 sublingual qday        estradiol 10 MCG Tabs vaginal tablet    VAGIFEM    36 tablet    Place 1 tablet (10 mcg) vaginally three times a week    Symptomatic menopausal or female climacteric states       Estradiol Cypionate Powd     45 g    0.5 mg daily Apply cream 0.5% to forearm once daily    Symptomatic menopausal or female climacteric states       * OCUVITE EXTRA PO      1 TABLET DAILY        * CENTRUM SILVER PO      1 tablet daily        omega 3 1000 MG Caps      Take 1 g by mouth daily        PROGESTERONE 100MG/G CREAM     45 g    APPLY 1/8 TEASPOONFUL TO SKIN EVERY DAY    Symptomatic menopausal or female climacteric states       ranitidine 150 MG tablet    ZANTAC    180 tablet    Take 1 tablet (150 mg) by mouth 2 times daily    Gastroesophageal reflux disease without esophagitis       TESTOSTERONE 2 MG/GM CREAM     120 g    Apply 1/8 teaspoon to skin 2-3 x week    Decreased libido       zolpidem 5 MG tablet    AMBIEN    30 tablet    Take 0.5-1 tablets (2.5-5 mg) by mouth nightly as needed for sleep    Insomnia, unspecified       * Notice:  This list has 2 medication(s) that are the same as other medications prescribed for you. Read the directions carefully, and ask your doctor or other care provider to review them with you.

## 2018-01-29 NOTE — PROGRESS NOTES
SUBJECTIVE:   Danika Hyman is a 62 year old female who presents to clinic today for the following health issues          Rash      Duration:  Noticed it 1/216/17    Description  Location: Left outer side of the lower arm  Itching: no    Intensity:  None    Accompanying signs and symptoms: None - Not raised, Just redness-    History (similar episodes/previous evaluation): None    Precipitating or alleviating factors:  New exposures:  None  Recent travel: no      Therapies tried and outcome: Progesterone is put onto her lower arms and does get on to the back of the arms.        Problem list and histories reviewed & adjusted, as indicated.  Additional history: as documented        Reviewed and updated as needed this visit by clinical staff       Reviewed and updated as needed this visit by Provider           Past Medical History:  ---------------------------  Past Medical History:   Diagnosis Date     Esophageal reflux      Osteoporosis 05/04/2017    DEXA:  lumbar -2.8, left hip -2.1, right hip -2.5     Status post bariatric surgery 1982    vertical banded gastrosplasty at [facility]       Past Surgical History:  ---------------------------  Past Surgical History:   Procedure Laterality Date     C NONSPECIFIC PROCEDURE      wisdom teeth extraction,abstracted     C NONSPECIFIC PROCEDURE      T&A,abstracted     C NONSPECIFIC PROCEDURE      2 natural childbirths,abstracted     C NONSPECIFIC PROCEDURE  1982    gastric stapling ast [facility]     CATARACT IOL, RT/LT  09/2016       Current Medications:  ---------------------------  Current Outpatient Prescriptions   Medication Sig Dispense Refill     PROGESTERONE 100MG/G CREAM APPLY 1/8 TEASPOONFUL TO SKIN EVERY DAY 45 g 0     Estradiol Cypionate POWD 0.5 mg daily Apply cream 0.5% to forearm once daily 45 g 2     estradiol (VAGIFEM) 10 MCG TABS vaginal tablet Place 1 tablet (10 mcg) vaginally three times a week 36 tablet 3     TESTOSTERONE 2 MG/GM CREAM Apply 1/8 teaspoon to  skin 2-3 x week 120 g 3     ranitidine (ZANTAC) 150 MG tablet Take 1 tablet (150 mg) by mouth 2 times daily 180 tablet 1     zolpidem (AMBIEN) 5 MG tablet Take 0.5-1 tablets (2.5-5 mg) by mouth nightly as needed for sleep 30 tablet 1     omega 3 1000 MG CAPS Take 1 g by mouth daily       ANUSOL-HC 2.5 % RE CREA apply B.I.D. or T.I.D. 1 Tube 4     CENTRUM SILVER OR 1 tablet daily       OCUVITE EXTRA OR 1 TABLET DAILY       VITAMIN B-12 500 MCG OR TABS 1 sublingual qday 30 5     CALCIUM PLUS TABS   OR 2 tabs q day         Allergies:  -------------  Allergies   Allergen Reactions     Codeine      Vomiting       Social History:  -------------------  Social History     Social History     Marital status:      Spouse name: N/A     Number of children: N/A     Years of education: N/A     Occupational History           Social History Main Topics     Smoking status: Never Smoker     Smokeless tobacco: Never Used     Alcohol use Yes      Comment: 4 glasses a week     Drug use: No     Sexual activity: Yes     Partners: Male     Other Topics Concern     Not on file     Social History Narrative       Family Medical History:  ------------------------------  Family History   Problem Relation Age of Onset     Arthritis Father      B:       DIABETES Father      Respiratory Father      Cardiovascular Father      Hypertension Mother      Alcohol/Drug Mother      B: alive     Lung Cancer Mother 89     lung cancer ;  age 91     Family History Negative Sister      Family History Negative Sister      Family History Negative Brother          ROS:  REVIEW OF SYSTEMS:    RESP: negative for cough, dyspnea, wheezing, hemoptysis  CV: negative for chest pain, palpitations, PND, MONIQUE, orthopnea; reports no changes in their ability to perform physical activity (from cardiovascular standpoint)  GI: negative for dysphagia, N/V, pain, melena, diarrhea and constipation  NEURO: negative for numbness/tingling, paralysis,  "incoordination, or focal weakness     OBJECTIVE:                                                    /72  Pulse 68  Temp 98  F (36.7  C) (Oral)  Resp 20  Ht 5' 3.75\" (1.619 m)  Wt 132 lb 8 oz (60.1 kg)  LMP 04/17/2009  Breastfeeding? No  BMI 22.92 kg/m2     GENERAL alert and no distress  EYES:  Normal sclera,conjunctiva, EOMI  HENT: oral and posterior pharynx without lesions or erythema, facies symmetric  NECK: Neck supple. No LAD, without thyroidmegaly or JVD., Carotids without bruits.  RESP: Clear to ausculation bilaterally without wheezes or crackles. Normal BS in all fields.  CV: RRR normal S1S2 without murmurs, rubs or gallops. PMI normal  LYMPH: no cervical lymph adenopathy appreciated  MS: extremities- no gross deformities of the visible extremities noted, no edema  PSYCH: Alert and oriented times 3; speech- coherent  SKIN:  No obvious significant skin lesions on visible portions of face   Small round superfical skin lesion, looks like ecchymosis on left forearm.  Nonraised, nontedner.          ASSESSMENT/PLAN:                                                      (L98.9) Skin lesion of left arm  (primary encounter diagnosis)  Comment: most probabl benign skin lesion.   Observe for now. She will let us know if any changes occur.   Plan:        See Patient Instructions    WES RING M.D., MD  [facility]   "

## 2018-02-14 DIAGNOSIS — K21.9 GASTROESOPHAGEAL REFLUX DISEASE WITHOUT ESOPHAGITIS: ICD-10-CM

## 2018-02-28 DIAGNOSIS — N95.1 SYMPTOMATIC MENOPAUSAL OR FEMALE CLIMACTERIC STATES: ICD-10-CM

## 2018-02-28 NOTE — TELEPHONE ENCOUNTER
Requested Prescriptions   Pending Prescriptions Disp Refills     PROGESTERONE 100MG/G CREAM 45 g 0    There is no refill protocol information for this order      Last Written Prescription Date:  01/25/2018  Last Fill Quantity: 45g ,  # refills: 0   Last office visit: 1/29/2018 with prescribing provider:  01/29/2018   Future Office Visit:

## 2018-03-15 DIAGNOSIS — N95.1 SYMPTOMATIC MENOPAUSAL OR FEMALE CLIMACTERIC STATES: ICD-10-CM

## 2018-03-15 RX ORDER — ESTRADIOL 10 UG/1
10 INSERT VAGINAL
Qty: 36 TABLET | Refills: 3 | Status: CANCELLED | OUTPATIENT
Start: 2018-03-15

## 2018-03-15 NOTE — TELEPHONE ENCOUNTER
"Received note from pharmacy:  \"plan does not cover this medication. Please call plan at [phone] to initiate prior authorization or fax new RX.  Pt ID # is [id number]. Plan limits\"  Ronda Trinidad RN    "

## 2018-03-16 ENCOUNTER — TELEPHONE (OUTPATIENT)
Dept: INTERNAL MEDICINE | Facility: CLINIC | Age: 63
End: 2018-03-16

## 2018-03-16 NOTE — TELEPHONE ENCOUNTER
"Received note from pharmacy:\"plan does not cover this medication. Please call plan at [phone] to initiate prior authorization or fax new RX.Pt ID # is [id number]. Plan limits\"Ronda Trinidad RN  "

## 2018-03-19 ENCOUNTER — OFFICE VISIT (OUTPATIENT)
Dept: ENDOCRINOLOGY | Facility: CLINIC | Age: 63
End: 2018-03-19
Payer: COMMERCIAL

## 2018-03-19 VITALS
BODY MASS INDEX: 22.72 KG/M2 | SYSTOLIC BLOOD PRESSURE: 100 MMHG | HEIGHT: 64 IN | HEART RATE: 74 BPM | DIASTOLIC BLOOD PRESSURE: 58 MMHG | WEIGHT: 133.1 LBS | OXYGEN SATURATION: 96 % | TEMPERATURE: 98 F

## 2018-03-19 DIAGNOSIS — M81.0 OSTEOPOROSIS, UNSPECIFIED OSTEOPOROSIS TYPE, UNSPECIFIED PATHOLOGICAL FRACTURE PRESENCE: Primary | ICD-10-CM

## 2018-03-19 PROCEDURE — 83970 ASSAY OF PARATHORMONE: CPT | Performed by: INTERNAL MEDICINE

## 2018-03-19 PROCEDURE — 99204 OFFICE O/P NEW MOD 45 MIN: CPT | Performed by: INTERNAL MEDICINE

## 2018-03-19 PROCEDURE — 36415 COLL VENOUS BLD VENIPUNCTURE: CPT | Performed by: INTERNAL MEDICINE

## 2018-03-19 PROCEDURE — 82306 VITAMIN D 25 HYDROXY: CPT | Performed by: INTERNAL MEDICINE

## 2018-03-19 RX ORDER — ZOLEDRONIC ACID 5 MG/100ML
5 INJECTION, SOLUTION INTRAVENOUS ONCE
Status: CANCELLED
Start: 2018-06-01 | End: 2018-06-01

## 2018-03-19 NOTE — PATIENT INSTRUCTIONS
[facility] locations   Dr Reyna, Endocrinology Department      [facility]   [address]  [place] [address]  Appointment [phone]  Fax: [phone]  [place]: Monday and Tuesday         [facility]   [address]  [place] [address]  Appointment [phone]  Fax: [phone]  [place]: Wednesday and Thursday          Labs today.  DEXA in May 2018 if covered by insurance.   REclast will be due in 2018- infusion center.    [facility]                [phone]   Infusion center- [facility].                [phone]     Please call infusion center to schedule the treatment/ test discussed.    Instructions on RECLAT. Treatment with bisphosphonate therapy will decrease fracture risk 50-70%.   There is risk of osteonecrosis of the jaw in patients using bisphosphonates is approximately 1700-1/100,000, with development most likely related to invasive dental procedures. If an invasive dental procedure was necessary, preferably stop the bisphosphonate approximately 3 months prior to reduce the risk. Let your dentist know that you are on this medication.  The data available at this time suggests that there is probably a small increase risk of atypical (nontraumatic) subtrochanteric fractures of the femur in patients on bisphosphonate therapy compared to those not on it. One large study suggested that for every 100 fractures prevented with bisphosphonate therapy, less than one femur fracture will occur. Other studies suggest one episode per 2,500 patient years. Patient should call with leg pain.

## 2018-03-19 NOTE — MR AVS SNAPSHOT
After Visit Summary   3/19/2018    Daniak Hyman    MRN: [id number]           Patient Information     Date Of Birth          1955        Visit Information        Provider Department      3/19/2018 3:00 PM Fe Reyna MD [facility]        Today's Diagnoses     Osteoporosis, unspecified osteoporosis type, unspecified pathological fracture presence    -  1      Care Instructions    [facility] & [employer] locations   Dr Reyna, Endocrinology Department      [facility]   [address]  [place] [address]  Appointment [phone]  Fax: [phone]  [place]: Monday and Tuesday         [place] [address]  [place] [address]  Appointment [phone]  Fax: [phone]  [employer]: Wednesday and Thursday          Labs today.  DEXA in May 2018 if covered by insurance.   REclast will be due in 2018- infusion center.    [facility]                [phone]   Infusion center- [facility].                [phone]     Please call infusion center to schedule the treatment/ test discussed.    Instructions on RECLAT. Treatment with bisphosphonate therapy will decrease fracture risk 50-70%.   There is risk of osteonecrosis of the jaw in patients using bisphosphonates is approximately 1700-1/100,000, with development most likely related to invasive dental procedures. If an invasive dental procedure was necessary, preferably stop the bisphosphonate approximately 3 months prior to reduce the risk. Let your dentist know that you are on this medication.  The data available at this time suggests that there is probably a small increase risk of atypical (nontraumatic) subtrochanteric fractures of the femur in patients on bisphosphonate therapy compared to those not on it. One large study suggested that for every 100  "fractures prevented with bisphosphonate therapy, less than one femur fracture will occur. Other studies suggest one episode per 2,500 patient years. Patient should call with leg pain.                  Follow-ups after your visit        Future tests that were ordered for you today     Open Future Orders        Priority Expected Expires Ordered    DX Hip/Pelvis/Spine w Lat Fraction Noreen Routine  3/19/2019 3/19/2018            Who to contact     If you have questions or need follow up information about today's clinic visit or your schedule please contact [facility] directly at [phone].  Normal or non-critical lab and imaging results will be communicated to you by Intellocorphart, letter or phone within 4 business days after the clinic has received the results. If you do not hear from us within 7 days, please contact the clinic through Ignyta or phone. If you have a critical or abnormal lab result, we will notify you by phone as soon as possible.  Submit refill requests through Ignyta or call your pharmacy and they will forward the refill request to us. Please allow 3 business days for your refill to be completed.          Additional Information About Your Visit        Ignyta Information     Ignyta gives you secure access to your electronic health record. If you see a primary care provider, you can also send messages to your care team and make appointments. If you have questions, please call your primary care clinic.  If you do not have a primary care provider, please call [phone] and they will assist you.        Care EveryWhere ID     This is your Care EveryWhere ID. This could be used by other organizations to access your [place] medical records  [id number]        Your Vitals Were     Pulse Temperature Height Last Period Pulse Oximetry BMI (Body Mass Index)    74 98  F (36.7  C) (Oral) 1.626 m (5' 4\") 04/17/2009 96% 22.85 kg/m2       Blood Pressure from Last 3 Encounters:   03/19/18 100/58 "   01/29/18 106/72   06/23/17 (!) 88/53    Weight from Last 3 Encounters:   03/19/18 60.4 kg (133 lb 1.6 oz)   01/29/18 60.1 kg (132 lb 8 oz)   03/22/17 58.5 kg (129 lb)              We Performed the Following     Parathyroid Hormone Intact     Vitamin D Deficiency        Primary Care Provider Office Phone # Fax #    Rupesh Jacobson [phone]       [address] [place] [address]        Equal Access to Services     [facility] : Hadii aad ku hadasho Soomaali, waaxda luqadaha, qaybta kaalmada adeegyada, waxnadia knutson . So [facility] [phone].    ATENCIÓN: Si habla español, tiene a gannon disposición servicios gratuitos de asistencia lingüística. Ll[facility] [phone].    We comply with applicable federal civil rights laws and [place] laws. We do not discriminate on the basis of race, color, national origin, age, disability, sex, sexual orientation, or gender identity.            Thank you!     Thank you for choosing [facility]  for your care. Our goal is always to provide you with excellent care. Hearing back from our patients is one way we can continue to improve our services. Please take a few minutes to complete the written survey that you may receive in the mail after your visit with us. Thank you!             Your Updated Medication List - Protect others around you: Learn how to safely use, store and throw away your medicines at www.disposemymeds.org.          This list is accurate as of 3/19/18  3:42 PM.  Always use your most recent med list.                   Brand Name Dispense Instructions for use Diagnosis    ANUSOL-HC 2.5 % cream   Generic drug:  hydrocortisone     1 Tube    apply B.I.D. or T.I.D.    External hemorrhoids without mention of complication       CALCIUM PLUS TABS   OR      2 tabs q day        cyanocolbalamin 500 MCG tablet    vitamin  B-12    30    1 sublingual qday        estradiol 10 MCG Tabs vaginal tablet    VAGIFEM    36  tablet    Place 1 tablet (10 mcg) vaginally three times a week    Symptomatic menopausal or female climacteric states       Estradiol Cypionate Powd     45 g    0.5 mg daily Apply cream 0.5% to forearm once daily    Symptomatic menopausal or female climacteric states       * OCUVITE EXTRA PO      1 TABLET DAILY        * CENTRUM SILVER PO      1 tablet daily        omega 3 1000 MG Caps      Take 1 g by mouth daily        PROGESTERONE 100MG/G CREAM     45 g    APPLY 1/8 TEASPOONFUL TO SKIN EVERY DAY    Symptomatic menopausal or female climacteric states       ranitidine 150 MG tablet    ZANTAC    180 tablet    TAKE 1 TABLET(150 MG) BY MOUTH TWICE DAILY    Gastroesophageal reflux disease without esophagitis       TESTOSTERONE 2 MG/GM CREAM     120 g    Apply 1/8 teaspoon to skin 2-3 x week    Decreased libido       zolpidem 5 MG tablet    AMBIEN    30 tablet    Take 0.5-1 tablets (2.5-5 mg) by mouth nightly as needed for sleep    Insomnia, unspecified       * Notice:  This list has 2 medication(s) that are the same as other medications prescribed for you. Read the directions carefully, and ask your doctor or other care provider to review them with you.

## 2018-03-19 NOTE — NURSING NOTE
"Chief Complaint   Patient presents with     Referral     osteoporosis, reclast was done in  was seen endo clinic we have records        Initial /58 (BP Location: Right arm, Patient Position: Chair, Cuff Size: Adult Regular)  Pulse 74  Temp 98  F (36.7  C) (Oral)  Ht 1.626 m (5' 4\")  Wt 60.4 kg (133 lb 1.6 oz)  LMP 2009  SpO2 96%  BMI 22.85 kg/m2 Estimated body mass index is 22.85 kg/(m^2) as calculated from the following:    Height as of this encounter: 1.626 m (5' 4\").    Weight as of this encounter: 60.4 kg (133 lb 1.6 oz).  Medication Reconciliation: complete     ENDOCRINOLOGY INTAKE FORM    Patient Name:  Danika Hyman  :  1955    Is patient Diabetic?   No  Does patient have non-diabetic or other endocrine issues?  Yes: osteoporosis     Vitals: /58 (BP Location: Right arm, Patient Position: Chair, Cuff Size: Adult Regular)  Pulse 74  Temp 98  F (36.7  C) (Oral)  Ht 1.626 m (5' 4\")  Wt 60.4 kg (133 lb 1.6 oz)  LMP 2009  SpO2 96%  BMI 22.85 kg/m2  BMI= Body mass index is 22.85 kg/(m^2).    Flu vaccine:  Yes: 17  Pneumonia vaccine:  No    Smoking and Alcohol use:  Social History   Substance Use Topics     Smoking status: Never Smoker     Smokeless tobacco: Never Used     Alcohol use Yes      Comment: 4 glasses a week         Staff Signature:  Ana Vargas CMA ([facility])        "

## 2018-03-19 NOTE — PROGRESS NOTES
Name: Danika Hyman  Date: 3/19/2018  Seen in consultation with Rupesh Jacobson for osteoporosis.     HPI:  Danika Hyman is a 63 year old female who presents for the evaluation of osteoperosis.  Other past medical history includes history of gastric bypass surgery, esophageal reflux disease.    H/o osteopenia- in 50s.  DEXA 2017- osteoporosis  Following that she was seen at [facility] [place].  At that time given her history of GERD and gastric bypass surgery, intravenous therapy was considered.    Reclast-- 2017. Tolerated well.  Last DEXA May 4th, 2017    H/o Gastric bypass at age 26 years.  Lost 80 lbs at that time and was able to keep it off.  H/o bulimia before that.  On HRT- followed by OBGYN.    Smoke:No  Family History: mother   Menstrual history/Birthing:   Fractures:in last 10 year- fracture metatarsal after a fall  Kidney stones: No  GI Surgery:h/o gastric bypass in past in   Duration of therapy: Reclast X 1 time ()  Exercise: does 33951 steps/day, does lifts weights.   Diet:   Milk: Minimal   Cheese: Minimal     Ca/Vitamin D: Calcium- 1500 mg/day , vit D 1800 IU/day  Alcohol:  rare  Eating Disorder: Yes: h/o bulimia as teenager  Steroid Use:  No  PMH/PSH:  Past Medical History:   Diagnosis Date     Esophageal reflux      Osteoporosis 2017    DEXA:  lumbar -2.8, left hip -2.1, right hip -2.5     Status post bariatric surgery     vertical banded gastrosplasty at [facility]     Past Surgical History:   Procedure Laterality Date     C NONSPECIFIC PROCEDURE      wisdom teeth extraction,abstracted     C NONSPECIFIC PROCEDURE      T&A,abstracted     C NONSPECIFIC PROCEDURE      2 natural childbirths,abstracted     C NONSPECIFIC PROCEDURE      gastric stapling ast [facility]     CATARACT IOL, RT/LT  2016     Family Hx:  Family History   Problem Relation Age of Onset     Arthritis Father      B:1925       DIABETES Father      Respiratory Father       "Cardiovascular Father      Hypertension Mother      Alcohol/Drug Mother      B: alive     Lung Cancer Mother 89     lung cancer ;  age 91     Family History Negative Sister      Family History Negative Sister      Family History Negative Brother              Social Hx:  Social History     Social History     Marital status:      Spouse name: N/A     Number of children: N/A     Years of education: N/A     Occupational History           Social History Main Topics     Smoking status: Never Smoker     Smokeless tobacco: Never Used     Alcohol use Yes      Comment: 4 glasses a week     Drug use: No     Sexual activity: Yes     Partners: Male     Other Topics Concern     Not on file     Social History Narrative          MEDICATIONS:  has a current medication list which includes the following prescription(s): progesterone, ranitidine, estradiol cypionate, estradiol, testosterone, omega 3, multiple vitamins-minerals, multiple vitamins-minerals, cyanocolbalamin, calcium & phosphate w/ vit d & iron, zolpidem, and anusol-hc.    ROS     ROS: 10 point ROS neg other than the symptoms noted above in the HPI.    Physical Exam   VS: /58 (BP Location: Right arm, Patient Position: Chair, Cuff Size: Adult Regular)  Pulse 74  Temp 98  F (36.7  C) (Oral)  Ht 1.626 m (5' 4\")  Wt 60.4 kg (133 lb 1.6 oz)  LMP 2009  SpO2 96%  BMI 22.85 kg/m2    GENERAL: AXOX3, NAD, well dressed, answering questions appropriately, appears stated age.  HEENT: No exopthalmous, no proptosis, EOMI, no lig lag, no retraction  NECK: Thyroid normal in size, non tender, no nodules were palpated.  CV: RRR  LUNGS: CTAB  ABDOMEN: +BS  NEUROLOGY: CN grossly intact, no tremors  SPINE: no TTP  PSYCH: normal affect and mood      LABS:  BMP:  Last Basic Metabolic Panel:  Lab Results   Component Value Date     2016      Lab Results   Component Value Date    POTASSIUM 4.1 2016     Lab Results   Component Value Date    " CHLORIDE 104 2016     Lab Results   Component Value Date    MAXIMO 8.9 2016     Lab Results   Component Value Date    CO2 32 2016     Lab Results   Component Value Date    BUN 23 2016     Lab Results   Component Value Date    CR 0.60 2016     Lab Results   Component Value Date    GLC 91 2016       TFTs:  Lab Results   Component Value Date    TSH 2.89 2016       LFTs:  Liver Function Studies -   Recent Labs   Lab Test  16   0909   PROTTOTAL  7.1   ALBUMIN  3.5   BILITOTAL  0.3   ALKPHOS  61   AST  30   ALT  35       PTH: NA    Vitamin D:  Vitamin D Deficiency Screening Results:  Lab Results   Component Value Date    VITDT 57 2016    VITDT 52 2013       DEXA 2017:  BONE DENSITOMETRY  [address] [address]  2017       PATIENT: Danika Hyman  CHART: [id number]   : 1955  AGE: 62 year old  SEX: female   REFERRING PROVIDER: Rupesh Jacobson M.D.      PROCEDURE: Bone density scanning was performed using DXA technology of the lumbar spine and hip. Scanning was performed on a Lunar Prodigy scanner. Reporting is completed in the form of a T-score. The T-score represents the standard deviation from peak bone mass based on a young healthy adult.      REFERENCE T-SCORES:   Normal -1.0 and greater   Osteopenia Between -1.0 and -2.5   Osteoporosis -2.5 and less       RISK FACTORS: Post-menopausal, Parent history of osteoporosis, Fracture of bone on top of foot at age 54, fell off step while gardening, Condition related to bone loss: gastric bypass, Follow-up osteopenia     CURRENT TREATMENT: Calcium, Vitamin D, Estrogen, Testosterone      FINDINGS:  Lumbar Spine (L1-L4) T-score: -2.8  Left Total Hip   T-score: -2.1  Right Total Hip   T-score: -2.5      Lumbar (L1-L4) BMD: 0.850 Previous: 0.987   Total Hip Mean BMD: 0.722 Previous: 0.814      Comparison is made to another DXA performed on a different elastic.io  machine since 2007        IMPRESSION  Osteoporosis  Degenerative changes of the spine  Recommendations include ensuring adequate daily Calcium and Vitamin D intake  Follow up scan can be considered in three years.     Comparisons from different scanners that have not been cross calibrated, are not necessarily valid. Such a comparison has been performed here; one should interpret with caution.  Compared to previous bone densitometry performed on this patient, there is the suggestion of a possible trend towards worsening of the lumbar spine, and a possible trend towards worsening of the total hip.     Current NOF guidelines recommend treatment for patients with the following:  - Prior hip or vertebral fracture  - T-score -2.5 or below  - A 10 year risk of any major osteoporotic fracture >20% or 10 year risk of hip fracture >3%, as calculated using the FRAX calculator (www.shef.ac.uk/FRAX).       Based on these guidelines, treatment (in addition to calcium and vitamin D) is recommended for this patient, after ruling out other causes of osteoporosis/low bone density.  While this is meant as an aid to clinical decision-making, clinical judgment must still be used.        All pertinent notes, labs, and images personally reviewed by me.   All pertinent notes, labs and reports done at outside clinic personally reviewed by me.      A/P  Ms.Nicole JOSETTE Hyman is a 63 year old here for the evaluation of:    #1 Osteoporosis/Osteopenia.     Risk factors for low bone density include family history, , female, s/p gastric bypass, low BMI, Estrogen deficiency, low Ca intake.  DEXA 2017 consistent with osteoporosis  She received Reclast in June 2017 and tolerated well.  Given history of gastric bypass as well as GERD will recommend to continue with intravenous treatment.  We will get labs as below  Plan for Reclast in June 2018  DEXA scan in [id number] based on insurance.  She will check with her insurance  Discussed indications,  risks and benefits of all medications prescribed, and answered questions to patient's satisfaction.  The patient indicates understanding of these issues and agrees with the plan.    The pt was advised to    Maintain an adequate calcium and vitamin D intake and to supplement vitamin D if needed to maintain serum levels of 25 hydroxy D (25 OH D) between 30-60 ng/ml.    Limit alcohol intake to no more than 2 servings per day.    Limit caffeine intake.    Maintain an active lifestyle including weight-bearing exercises for at least 30 mins daily.    Take measures to reduce the risk of falling.    Instructions on RECLAT. Treatment with bisphosphonate therapy will decrease fracture risk 50-70%.   There is risk of osteonecrosis of the jaw in patients using bisphosphonates is approximately 1/1700-1/100,000, with development most likely related to invasive dental procedures. If an invasive dental procedure was necessary, preferably stop the bisphosphonate approximately 3 months prior to reduce the risk. Let your dentist know that you are on this medication.  The data available at this time suggests that there is probably a small increase risk of atypical (nontraumatic) subtrochanteric fractures of the femur in patients on bisphosphonate therapy compared to those not on it. One large study suggested that for every 100 fractures prevented with bisphosphonate therapy, less than one femur fracture will occur. Other studies suggest one episode per 2,500 patient years. Patient should call with leg pain.        More than 50% of the time spent with Ms. Hyman on counseling / coordinating her care.      Follow-up:  1 year    Fe Reyna MD  Endocrinology  [facility]  CC: Rupesh Jacobson    Addendum to above note and clinic visit:    Labs reviewed.    See result note/telephone encounter.

## 2018-03-19 NOTE — LETTER
3/19/2018         RE: Danika Hyman  [address] [facility] [place] [address]        Dear Colleague,    Thank you for referring your patient, Danika Hyman, [facility]. Please see a copy of my visit note below.    Name: Danika Hyman  Date: 3/19/2018  Seen in consultation with Rupesh Jacobson for osteoporosis.     HPI:  Danika Hyman is a 63 year old female who presents for the evaluation of osteoperosis.  Other past medical history includes history of gastric bypass surgery, esophageal reflux disease.    H/o osteopenia- in 50s.  DEXA 2017- osteoporosis  Following that she was seen at [facility] [place].  At that time given her history of GERD and gastric bypass surgery, intravenous therapy was considered.    Reclast-- June 23, 2017. Tolerated well.  Last DEXA May 4th, 2017    H/o Gastric bypass at age 26 years.  Lost 80 lbs at that time and was able to keep it off.  H/o bulimia before that.  On HRT- followed by OBGYN.    Smoke:No  Family History: mother   Menstrual history/Birthing:   Fractures:in last 10 year- fracture metatarsal after a fall  Kidney stones: No  GI Surgery:h/o gastric bypass in past in 1982  Duration of therapy: Reclast X 1 time (2017)  Exercise: does 11962 steps/day, does lifts weights.   Diet:   Milk: Minimal   Cheese: Minimal     Ca/Vitamin D: Calcium- 1500 mg/day , vit D 1800 IU/day  Alcohol:  rare  Eating Disorder: Yes: h/o bulimia as teenager  Steroid Use:  No  PMH/PSH:  Past Medical History:   Diagnosis Date     Esophageal reflux      Osteoporosis 05/04/2017    DEXA:  lumbar -2.8, left hip -2.1, right hip -2.5     Status post bariatric surgery 1982    vertical banded gastrosplasty at [facility]     Past Surgical History:   Procedure Laterality Date     C NONSPECIFIC PROCEDURE      wisdom teeth extraction,abstracted     C NONSPECIFIC PROCEDURE      T&A,abstracted     C NONSPECIFIC PROCEDURE      2 natural childbirths,abstracted     C NONSPECIFIC  "PROCEDURE  1982    gastric stapling [facility]     CATARACT IOL, RT/LT  2016     Family Hx:  Family History   Problem Relation Age of Onset     Arthritis Father      B:       DIABETES Father      Respiratory Father      Cardiovascular Father      Hypertension Mother      Alcohol/Drug Mother      B: alive     Lung Cancer Mother 89     lung cancer ;  age 91     Family History Negative Sister      Family History Negative Sister      Family History Negative Brother              Social Hx:  Social History     Social History     Marital status:      Spouse name: N/A     Number of children: N/A     Years of education: N/A     Occupational History           Social History Main Topics     Smoking status: Never Smoker     Smokeless tobacco: Never Used     Alcohol use Yes      Comment: 4 glasses a week     Drug use: No     Sexual activity: Yes     Partners: Male     Other Topics Concern     Not on file     Social History Narrative          MEDICATIONS:  has a current medication list which includes the following prescription(s): progesterone, ranitidine, estradiol cypionate, estradiol, testosterone, omega 3, multiple vitamins-minerals, multiple vitamins-minerals, cyanocolbalamin, calcium & phosphate w/ vit d & iron, zolpidem, and anusol-hc.    ROS     ROS: 10 point ROS neg other than the symptoms noted above in the HPI.    Physical Exam   VS: /58 (BP Location: Right arm, Patient Position: Chair, Cuff Size: Adult Regular)  Pulse 74  Temp 98  F (36.7  C) (Oral)  Ht 1.626 m (5' 4\")  Wt 60.4 kg (133 lb 1.6 oz)  LMP 2009  SpO2 96%  BMI 22.85 kg/m2    GENERAL: AXOX3, NAD, well dressed, answering questions appropriately, appears stated age.  HEENT: No exopthalmous, no proptosis, EOMI, no lig lag, no retraction  NECK: Thyroid normal in size, non tender, no nodules were palpated.  CV: RRR  LUNGS: CTAB  ABDOMEN: +BS  NEUROLOGY: CN grossly intact, no tremors  SPINE: no TTP  PSYCH: " normal affect and mood      LABS:  BMP:  Last Basic Metabolic Panel:  Lab Results   Component Value Date     2016      Lab Results   Component Value Date    POTASSIUM 4.1 2016     Lab Results   Component Value Date    CHLORIDE 104 2016     Lab Results   Component Value Date    MAXIMO 8.9 2016     Lab Results   Component Value Date    CO2 32 2016     Lab Results   Component Value Date    BUN 23 2016     Lab Results   Component Value Date    CR 0.60 2016     Lab Results   Component Value Date    GLC 91 2016       TFTs:  Lab Results   Component Value Date    TSH 2.89 2016       LFTs:  Liver Function Studies -   Recent Labs   Lab Test  16   0909   PROTTOTAL  7.1   ALBUMIN  3.5   BILITOTAL  0.3   ALKPHOS  61   AST  30   ALT  35       PTH: NA    Vitamin D:  Vitamin D Deficiency Screening Results:  Lab Results   Component Value Date    VITDT 57 2016    VITDT 52 2013       DEXA 2017:  [place] [address]  2017       PATIENT: Danika Hyman  CHART: [id number]   : 1955  AGE: 62 year old  SEX: female   REFERRING PROVIDER: Rupesh Jacobson M.D.      PROCEDURE: Bone density scanning was performed using DXA technology of the lumbar spine and hip. Scanning was performed on a Lunar Prodigy scanner. Reporting is completed in the form of a T-score. The T-score represents the standard deviation from peak bone mass based on a young healthy adult.      REFERENCE T-SCORES:   Normal -1.0 and greater   Osteopenia Between -1.0 and -2.5   Osteoporosis -2.5 and less       RISK FACTORS: Post-menopausal, Parent history of osteoporosis, Fracture of bone on top of foot at age 54, fell off step while gardening, Condition related to bone loss: gastric bypass, Follow-up osteopenia     CURRENT TREATMENT: Calcium, Vitamin D, Estrogen, Testosterone      FINDINGS:  Lumbar Spine (L1-L4) T-score:  -2.8  Left Total Hip   T-score: -2.1  Right Total Hip   T-score: -2.5      Lumbar (L1-L4) BMD: 0.850 Previous: 0.987   Total Hip Mean BMD: 0.722 Previous: 0.814      Comparison is made to another DXA performed on a different [employer] machine since 2007        IMPRESSION  Osteoporosis  Degenerative changes of the spine  Recommendations include ensuring adequate daily Calcium and Vitamin D intake  Follow up scan can be considered in three years.     Comparisons from different scanners that have not been cross calibrated, are not necessarily valid. Such a comparison has been performed here; one should interpret with caution.  Compared to previous bone densitometry performed on this patient, there is the suggestion of a possible trend towards worsening of the lumbar spine, and a possible trend towards worsening of the total hip.     Current NOF guidelines recommend treatment for patients with the following:  - Prior hip or vertebral fracture  - T-score -2.5 or below  - A 10 year risk of any major osteoporotic fracture >20% or 10 year risk of hip fracture >3%, as calculated using the FRAX calculator (www.shef.ac.uk/FRAX).       Based on these guidelines, treatment (in addition to calcium and vitamin D) is recommended for this patient, after ruling out other causes of osteoporosis/low bone density.  While this is meant as an aid to clinical decision-making, clinical judgment must still be used.        All pertinent notes, labs, and images personally reviewed by me.   All pertinent notes, labs and reports done at outside clinic personally reviewed by me.      A/P  Ms.Nicole JOSETTE Hyman is a 63 year old here for the evaluation of:    #1 Osteoporosis/Osteopenia.     Risk factors for low bone density include family history, , female, s/p gastric bypass, low BMI, Estrogen deficiency, low Ca intake.  DEXA 2017 consistent with osteoporosis  She received Reclast in June 2017 and tolerated well.  Given history of gastric bypass as  well as GERD will recommend to continue with intravenous treatment.  We will get labs as below  Plan for Reclast in June 2018  DEXA scan in [id number] based on insurance.  She will check with her insurance  Discussed indications, risks and benefits of all medications prescribed, and answered questions to patient's satisfaction.  The patient indicates understanding of these issues and agrees with the plan.    The pt was advised to    Maintain an adequate calcium and vitamin D intake and to supplement vitamin D if needed to maintain serum levels of 25 hydroxy D (25 OH D) between 30-60 ng/ml.    Limit alcohol intake to no more than 2 servings per day.    Limit caffeine intake.    Maintain an active lifestyle including weight-bearing exercises for at least 30 mins daily.    Take measures to reduce the risk of falling.    Instructions on RECLAT. Treatment with bisphosphonate therapy will decrease fracture risk 50-70%.   There is risk of osteonecrosis of the jaw in patients using bisphosphonates is approximately 1/1700-1/100,000, with development most likely related to invasive dental procedures. If an invasive dental procedure was necessary, preferably stop the bisphosphonate approximately 3 months prior to reduce the risk. Let your dentist know that you are on this medication.  The data available at this time suggests that there is probably a small increase risk of atypical (nontraumatic) subtrochanteric fractures of the femur in patients on bisphosphonate therapy compared to those not on it. One large study suggested that for every 100 fractures prevented with bisphosphonate therapy, less than one femur fracture will occur. Other studies suggest one episode per 2,500 patient years. Patient should call with leg pain.        More than 50% of the time spent with Ms. Hyman on counseling / coordinating her care.      Follow-up:  1 year    Fe Reyna MD  Endocrinology  [facility]  CC: Rupesh Jacobson  [place]    Addendum to above note and clinic visit:    Labs reviewed.    See result note/telephone encounter.            Again, thank you for allowing me to participate in the care of your patient.        Sincerely,        Fe Reyna MD

## 2018-03-20 LAB
DEPRECATED CALCIDIOL+CALCIFEROL SERPL-MC: 55 UG/L (ref 20–75)
PTH-INTACT SERPL-MCNC: 41 PG/ML (ref 18–80)

## 2018-03-21 NOTE — TELEPHONE ENCOUNTER
Request has been refaxed to [employer].    PA Initiation    Medication: VAGIFEM 10MCG  Insurance Company: [employer] - Phone [phone] Fax [phone]  [facility] [address] [place], [place] - [address]  Filling Pharmacy Phone: [phone]  Filling Pharmacy Fax:    Start Date: 3/21/2018

## 2018-03-21 NOTE — TELEPHONE ENCOUNTER
PA Initiation    Medication: VAGIFEM 10MCG  Insurance Company: [employer] - Phone [phone] Fax [phone]  [facility] [facility] [address] [place] [address] [place] - [address]  Filling Pharmacy Phone: [phone]  Filling Pharmacy Fax:    Start Date: 3/21/2018

## 2018-03-22 NOTE — TELEPHONE ENCOUNTER
Per insurance:        Called pharmacy to see what type of rejection they were getting on medication. Rejection is a refill too soon. Patient cannot fill medication until 4/5/18.

## 2018-03-25 ENCOUNTER — MYC MEDICAL ADVICE (OUTPATIENT)
Dept: OBGYN | Facility: CLINIC | Age: 63
End: 2018-03-25

## 2018-03-25 DIAGNOSIS — N95.1 SYMPTOMATIC MENOPAUSAL OR FEMALE CLIMACTERIC STATES: ICD-10-CM

## 2018-03-26 RX ORDER — ESTRADIOL CYPIONATE
0.5 POWDER (GRAM) MISCELLANEOUS DAILY
Qty: 45 G | Refills: 2 | Status: SHIPPED | OUTPATIENT
Start: 2018-03-26 | End: 2018-10-31

## 2018-04-17 DIAGNOSIS — R68.82 DECREASED LIBIDO: ICD-10-CM

## 2018-04-17 DIAGNOSIS — N95.1 SYMPTOMATIC MENOPAUSAL OR FEMALE CLIMACTERIC STATES: ICD-10-CM

## 2018-04-18 NOTE — TELEPHONE ENCOUNTER
Routing refill request to provider for review/approval because:  Drug not on the [facility] refill protocol     Last filled 2/28/18 and 10/13/17.  Last office visit 1/29/18

## 2018-04-28 ENCOUNTER — HEALTH MAINTENANCE LETTER (OUTPATIENT)
Age: 63
End: 2018-04-28

## 2018-05-26 ENCOUNTER — RADIANT APPOINTMENT (OUTPATIENT)
Dept: BONE DENSITY | Facility: CLINIC | Age: 63
End: 2018-05-26
Payer: COMMERCIAL

## 2018-05-26 ENCOUNTER — RADIANT APPOINTMENT (OUTPATIENT)
Dept: MAMMOGRAPHY | Facility: CLINIC | Age: 63
End: 2018-05-26
Payer: COMMERCIAL

## 2018-05-26 DIAGNOSIS — Z12.31 VISIT FOR SCREENING MAMMOGRAM: ICD-10-CM

## 2018-05-26 DIAGNOSIS — M81.0 OSTEOPOROSIS, UNSPECIFIED OSTEOPOROSIS TYPE, UNSPECIFIED PATHOLOGICAL FRACTURE PRESENCE: ICD-10-CM

## 2018-05-26 PROCEDURE — 77067 SCR MAMMO BI INCL CAD: CPT | Mod: TC

## 2018-05-26 PROCEDURE — 77080 DXA BONE DENSITY AXIAL: CPT | Performed by: FAMILY MEDICINE

## 2018-06-04 ENCOUNTER — HOSPITAL ENCOUNTER (OUTPATIENT)
Dept: MAMMOGRAPHY | Facility: CLINIC | Age: 63
Discharge: HOME | End: 2018-06-04
Attending: INTERNAL MEDICINE | Admitting: INTERNAL MEDICINE
Payer: COMMERCIAL

## 2018-06-04 ENCOUNTER — HOSPITAL ENCOUNTER (OUTPATIENT)
Dept: ULTRASOUND IMAGING | Facility: CLINIC | Age: 63
End: 2018-06-04
Attending: INTERNAL MEDICINE
Payer: COMMERCIAL

## 2018-06-04 DIAGNOSIS — R92.8 ABNORMAL MAMMOGRAM: ICD-10-CM

## 2018-06-04 PROCEDURE — 77065 DX MAMMO INCL CAD UNI: CPT

## 2018-06-04 PROCEDURE — 76642 ULTRASOUND BREAST LIMITED: CPT | Mod: RT

## 2018-07-02 ENCOUNTER — MYC MEDICAL ADVICE (OUTPATIENT)
Dept: ENDOCRINOLOGY | Facility: CLINIC | Age: 63
End: 2018-07-02

## 2018-07-02 DIAGNOSIS — N95.1 SYMPTOMATIC MENOPAUSAL OR FEMALE CLIMACTERIC STATES: ICD-10-CM

## 2018-07-02 NOTE — TELEPHONE ENCOUNTER
FV-Progesterone 10% in Vanicream (compounded)      Last Written Prescription Date:  4/18/18  Last Fill Quantity:45 grams,   # refills:0  Last Office Visit: 1/29/18  Future Office visit:   NONE Scheduled    Routing refill request to provider for review/approval because:  Compounded medication

## 2018-07-20 ENCOUNTER — HOSPITAL ENCOUNTER (OUTPATIENT)
Facility: CLINIC | Age: 63
Setting detail: SPECIMEN
Discharge: HOME | End: 2018-07-20
Attending: INTERNAL MEDICINE | Admitting: INTERNAL MEDICINE
Payer: COMMERCIAL

## 2018-07-20 ENCOUNTER — INFUSION THERAPY VISIT (OUTPATIENT)
Dept: INFUSION THERAPY | Facility: CLINIC | Age: 63
End: 2018-07-20
Attending: INTERNAL MEDICINE
Payer: COMMERCIAL

## 2018-07-20 VITALS — DIASTOLIC BLOOD PRESSURE: 57 MMHG | HEART RATE: 70 BPM | TEMPERATURE: 98.6 F | SYSTOLIC BLOOD PRESSURE: 91 MMHG

## 2018-07-20 DIAGNOSIS — Z92.29 PERSONAL HISTORY OF DRUG THERAPY: Primary | ICD-10-CM

## 2018-07-20 DIAGNOSIS — M81.0 OSTEOPOROSIS, UNSPECIFIED OSTEOPOROSIS TYPE, UNSPECIFIED PATHOLOGICAL FRACTURE PRESENCE: ICD-10-CM

## 2018-07-20 DIAGNOSIS — Z98.84 STATUS POST BARIATRIC SURGERY: ICD-10-CM

## 2018-07-20 LAB
CALCIUM SERPL-MCNC: 9 MG/DL (ref 8.5–10.1)
CREAT SERPL-MCNC: 0.62 MG/DL (ref 0.52–1.04)
GFR SERPL CREATININE-BSD FRML MDRD: >90 ML/MIN/1.7M2

## 2018-07-20 PROCEDURE — 82310 ASSAY OF CALCIUM: CPT | Performed by: INTERNAL MEDICINE

## 2018-07-20 PROCEDURE — 96374 THER/PROPH/DIAG INJ IV PUSH: CPT

## 2018-07-20 PROCEDURE — 25000128 H RX IP 250 OP 636: Performed by: INTERNAL MEDICINE

## 2018-07-20 PROCEDURE — 82565 ASSAY OF CREATININE: CPT | Performed by: INTERNAL MEDICINE

## 2018-07-20 RX ORDER — ZOLEDRONIC ACID 5 MG/100ML
5 INJECTION, SOLUTION INTRAVENOUS ONCE
Status: COMPLETED | OUTPATIENT
Start: 2018-07-20 | End: 2018-07-20

## 2018-07-20 RX ORDER — ZOLEDRONIC ACID 5 MG/100ML
5 INJECTION, SOLUTION INTRAVENOUS ONCE
Status: CANCELLED
Start: 2018-07-20 | End: 2018-07-20

## 2018-07-20 RX ADMIN — ZOLEDRONIC ACID 5 MG: 0.05 INJECTION, SOLUTION INTRAVENOUS at 15:42

## 2018-07-20 NOTE — MR AVS SNAPSHOT
After Visit Summary   7/20/2018    Danika Hyman    MRN: [id number]           Patient Information     Date Of Birth          1955        Visit Information        Provider Department      7/20/2018 3:00 PM  INFUSION CHAIR 3 [facility]        Today's Diagnoses     Personal history of drug therapy    -  1    Osteoporosis, unspecified osteoporosis type, unspecified pathological fracture presence        Status post bariatric surgery           Follow-ups after your visit        Who to contact     If you have questions or need follow up information about today's clinic visit or your schedule please contact [facility] directly at [phone].  Normal or non-critical lab and imaging results will be communicated to you by Anchorâ„¢hart, letter or phone within 4 business days after the clinic has received the results. If you do not hear from us within 7 days, please contact the clinic through Takeacodert or phone. If you have a critical or abnormal lab result, we will notify you by phone as soon as possible.  Submit refill requests through Helijia or call your pharmacy and they will forward the refill request to us. Please allow 3 business days for your refill to be completed.          Additional Information About Your Visit        MyChart Information     Helijia gives you secure access to your electronic health record. If you see a primary care provider, you can also send messages to your care team and make appointments. If you have questions, please call your primary care clinic.  If you do not have a primary care provider, please call [phone] and they will assist you.        Care EveryWhere ID     This is your Care EveryWhere ID. This could be used by other organizations to access your [facility] medical records  [id number]        Your Vitals Were     Pulse Temperature Last Period             70 98.6  F (37  C) (Tympanic) 04/17/2009           Blood Pressure from Last 3 Encounters:   07/20/18 91/57   03/19/18 100/58   01/29/18 106/72    Weight from Last 3 Encounters:   03/19/18 60.4 kg (133 lb 1.6 oz)   01/29/18 60.1 kg (132 lb 8 oz)   03/22/17 58.5 kg (129 lb)              We Performed the Following     Calcium     Creatinine        Primary Care Provider Office Phone # Fax #    Rupesh Jacobson [phone]       [address] [place] [address]        Equal Access to Services     [facility]: Hadii aad ku hadasho Soomaali, waaxda luqadaha, qaybta kaalmada adeegyadanny, sari knutson . So [facility] [phone].    ATENCIÓN: Si habla español, tiene a gannon disposición servicios gratuitos de asistencia lingüística. [facility] [phone].    We comply with applicable federal civil rights laws and [place] laws. We do not discriminate on the basis of race, color, national origin, age, disability, sex, sexual orientation, or gender identity.            Thank you!     Thank you for choosing [facility]  for your care. Our goal is always to provide you with excellent care. Hearing back from our patients is one way we can continue to improve our services. Please take a few minutes to complete the written survey that you may receive in the mail after your visit with us. Thank you!             Your Updated Medication List - Protect others around you: Learn how to safely use, store and throw away your medicines at www.disposemymeds.org.          This list is accurate as of 7/20/18  4:05 PM.  Always use your most recent med list.                   Brand Name Dispense Instructions for use Diagnosis    ANUSOL-HC 2.5 % cream   Generic drug:  hydrocortisone     1 Tube    apply B.I.D. or T.I.D.    External hemorrhoids without mention of complication       CALCIUM PLUS TABS   OR      2 tabs q day        cyanocolbalamin 500 MCG tablet    vitamin  B-12    30    1 sublingual qday         estradiol 10 MCG Tabs vaginal tablet    VAGIFEM    36 tablet    Place 1 tablet (10 mcg) vaginally three times a week    Symptomatic menopausal or female climacteric states       Estradiol Cypionate Powd     45 g    0.5 mg daily Apply cream 0.5% to forearm once daily    Symptomatic menopausal or female climacteric states       * OCUVITE EXTRA PO      1 TABLET DAILY        * CENTRUM SILVER PO      1 tablet daily        omega 3 1000 MG Caps      Take 1 g by mouth daily        PROGESTERONE 100MG/G CREAM     45 g    APPLY 1/8 TEASPOONFUL TO SKIN EVERY DAY    Symptomatic menopausal or female climacteric states       ranitidine 150 MG tablet    ZANTAC    180 tablet    TAKE 1 TABLET(150 MG) BY MOUTH TWICE DAILY    Gastroesophageal reflux disease without esophagitis       TESTOSTERONE 2 MG/GM CREAM     120 g    Apply 1/8 teaspoon to skin 2-3 x week    Decreased libido       zolpidem 5 MG tablet    AMBIEN    30 tablet    Take 0.5-1 tablets (2.5-5 mg) by mouth nightly as needed for sleep    Insomnia, unspecified       * Notice:  This list has 2 medication(s) that are the same as other medications prescribed for you. Read the directions carefully, and ask your doctor or other care provider to review them with you.

## 2018-07-20 NOTE — PROGRESS NOTES
Infusion Nursing Note:  Danika Hyman presents today for Reclast.    Patient seen by provider today: No   present during visit today: Not Applicable.    Note: N/A.    Intravenous Access:  Labs drawn without difficulty.  Peripheral IV placed.    Treatment Conditions:  Results reviewed, labs MET treatment parameters, ok to proceed with treatment.  Creat 0.62  Calcium 9.0  Creat clearance 91ml/min per Cockcroft-Gault method      Post Infusion Assessment:  Patient tolerated infusion without incident.  Blood return noted pre and post infusion.  Site patent and intact, free from redness, edema or discomfort.  No evidence of extravasations.  Access discontinued per protocol.    Discharge Plan:   Patient discharged in stable condition accompanied by: self.  Departure Mode: Ambulatory.    Mary Lynch RN

## 2018-08-13 ENCOUNTER — MYC MEDICAL ADVICE (OUTPATIENT)
Dept: ENDOCRINOLOGY | Facility: CLINIC | Age: 63
End: 2018-08-13

## 2018-08-22 NOTE — TELEPHONE ENCOUNTER
We have no forms to fill out and I don't see in chart where a PA was done. Is this kind of PA handled through the infusion center?

## 2018-08-31 DIAGNOSIS — N95.1 SYMPTOMATIC MENOPAUSAL OR FEMALE CLIMACTERIC STATES: ICD-10-CM

## 2018-08-31 NOTE — TELEPHONE ENCOUNTER
Requested Prescriptions   Pending Prescriptions Disp Refills     PROGESTERONE 100MG/G CREAM 45 g 0     Sig: APPLY 1/8 TEASPOONFUL TO SKIN EVERY DAY    There is no refill protocol information for this order        Last Written Prescription Date:  7/2/2018  Last Fill Quantity: 45,  # refills: 0   Last office visit: 1/29/2018 with prescribing provider:  1/29/2018   Future Office Visit:

## 2018-10-31 DIAGNOSIS — R68.82 DECREASED LIBIDO: ICD-10-CM

## 2018-10-31 DIAGNOSIS — N95.1 SYMPTOMATIC MENOPAUSAL OR FEMALE CLIMACTERIC STATES: ICD-10-CM

## 2018-10-31 RX ORDER — ESTRADIOL CYPIONATE
0.5 POWDER (GRAM) MISCELLANEOUS DAILY
Qty: 45 G | Refills: 2 | Status: SHIPPED | OUTPATIENT
Start: 2018-10-31 | End: 2019-05-01

## 2018-10-31 NOTE — TELEPHONE ENCOUNTER
FV-Testosterone 0.2% in Vanicream (compounded)  Last Written Prescription Date: 4/18/18  Last Fill Quantity: 120 grams,   # refills: 3      FV-Progesterone 10% in Vanicream (compounded)      Last Written Prescription Date: 9/5/18  Last Fill Quantity: 45 grams,   # refills: 0  Last Office Visit:  unknown  Future Office visit: None scheduled    Routing refill request to provider for review/approval because:  Compounded medication

## 2018-10-31 NOTE — TELEPHONE ENCOUNTER
FV-Estradiol 0.05% in Vanicream (compounded)    Last Written Prescription Date: 3/26/2018  Last Fill Quantity: 45 grams,   # refills: 2  Last Office Visit: unknown  Future Office visit:   None schedule    Routing refill request to provider for review/approval because:  Compounded medication

## 2018-12-08 ENCOUNTER — MYC MEDICAL ADVICE (OUTPATIENT)
Dept: ENDOCRINOLOGY | Facility: CLINIC | Age: 63
End: 2018-12-08

## 2018-12-10 NOTE — TELEPHONE ENCOUNTER
I am sorry- I am seeing patients for adult endocrinology only.  I would recommend patient to establish primary care at [facility].  Her DEXA scan will be due in 2019.  Last DEXA 5/2018.    Please inform pt.

## 2018-12-14 DIAGNOSIS — N95.1 SYMPTOMATIC MENOPAUSAL OR FEMALE CLIMACTERIC STATES: ICD-10-CM

## 2018-12-14 NOTE — TELEPHONE ENCOUNTER
Requested Prescriptions   Pending Prescriptions Disp Refills     PROGESTERONE 100MG/G CREAM 45 g 0     Sig: APPLY 1/8 TEASPOONFUL TO SKIN EVERY DAY    There is no refill protocol information for this order        Last Written Prescription Date:  10/31/18  Last Fill Quantity: 45g,  # refills: 0   Last office visit: 1/29/2018 with prescribing provider:  1/29/18   Future Office Visit:

## 2018-12-18 DIAGNOSIS — N95.1 SYMPTOMATIC MENOPAUSAL OR FEMALE CLIMACTERIC STATES: ICD-10-CM

## 2018-12-19 RX ORDER — ESTRADIOL 0.1 MG/G
2 CREAM VAGINAL
Qty: 42.5 G | Refills: 0 | Status: SHIPPED | OUTPATIENT
Start: 2018-12-20 | End: 2019-01-31

## 2018-12-19 NOTE — TELEPHONE ENCOUNTER
"Requested Prescriptions   Pending Prescriptions Disp Refills     estradiol (ESTRACE) 0.1 MG/GM vaginal cream 42.5 g 3     Sig: Place 2 g vaginally twice a week    Hormone Replacement Therapy Failed - 12/19/2018  1:51 PM       Failed - Recent (12 mo) or future (30 days) visit within the authorizing provider's specialty    Patient had office visit in the last 12 months or has a visit in the next 30 days with authorizing provider or within the authorizing provider's specialty.  See \"Patient Info\" tab in inbasket, or \"Choose Columns\" in Meds & Orders section of the refill encounter.             Passed - Blood pressure under 140/90 in past 12 months    BP Readings from Last 3 Encounters:   07/20/18 91/57   03/19/18 100/58   01/29/18 106/72                Passed - Patient has mammogram in past 2 years on file if age 50-75       Passed - Patient is 18 years of age or older       Passed - No active pregnancy on record       Passed - No positive pregnancy test on record in past 12 months        3 months of rx given. Swarm64 message sent to pt informing her of the need for an appt before future refills.      Priyanka Silva RN    "

## 2018-12-19 NOTE — TELEPHONE ENCOUNTER
"Requested Prescriptions   Pending Prescriptions Disp Refills     estradiol (ESTRACE) 0.1 MG/GM vaginal cream    Last Written Prescription Date:  8/13/2018  Last Fill Quantity: 42.5 g,  # refills: 3   Last office visit: 3/22/2017 with prescribing provider:  Rupesh Jacobson     Future Office Visit:     42.5 g 3     Sig: Place 2 g vaginally twice a week    Hormone Replacement Therapy Failed - 12/18/2018  7:46 PM       Failed - Recent (12 mo) or future (30 days) visit within the authorizing provider's specialty    Patient had office visit in the last 12 months or has a visit in the next 30 days with authorizing provider or within the authorizing provider's specialty.  See \"Patient Info\" tab in inbasket, or \"Choose Columns\" in Meds & Orders section of the refill encounter.             Passed - Blood pressure under 140/90 in past 12 months    BP Readings from Last 3 Encounters:   07/20/18 91/57   03/19/18 100/58   01/29/18 106/72                Passed - Patient has mammogram in past 2 years on file if age 50-75       Passed - Patient is 18 years of age or older       Passed - No active pregnancy on record       Passed - No positive pregnancy test on record in past 12 months          "

## 2019-01-23 ENCOUNTER — OFFICE VISIT (OUTPATIENT)
Dept: OBGYN | Facility: CLINIC | Age: 64
End: 2019-01-23
Payer: COMMERCIAL

## 2019-01-23 VITALS
HEART RATE: 68 BPM | BODY MASS INDEX: 21.63 KG/M2 | WEIGHT: 126 LBS | SYSTOLIC BLOOD PRESSURE: 100 MMHG | DIASTOLIC BLOOD PRESSURE: 70 MMHG

## 2019-01-23 DIAGNOSIS — Z01.419 ENCOUNTER FOR GYNECOLOGICAL EXAMINATION WITHOUT ABNORMAL FINDING: Primary | ICD-10-CM

## 2019-01-23 LAB — INSULIN SERPL-ACNC: 2.2 MU/L (ref 3–25)

## 2019-01-23 PROCEDURE — 99396 PREV VISIT EST AGE 40-64: CPT | Performed by: OBSTETRICS & GYNECOLOGY

## 2019-01-23 PROCEDURE — G0145 SCR C/V CYTO,THINLAYER,RESCR: HCPCS | Performed by: OBSTETRICS & GYNECOLOGY

## 2019-01-23 PROCEDURE — 36415 COLL VENOUS BLD VENIPUNCTURE: CPT | Performed by: OBSTETRICS & GYNECOLOGY

## 2019-01-23 PROCEDURE — 83525 ASSAY OF INSULIN: CPT | Performed by: OBSTETRICS & GYNECOLOGY

## 2019-01-23 PROCEDURE — 87624 HPV HI-RISK TYP POOLED RSLT: CPT | Performed by: OBSTETRICS & GYNECOLOGY

## 2019-01-23 NOTE — NURSING NOTE
"Chief Complaint   Patient presents with     Physical     last pap 14 NIL - mammogram 18       Initial /70 (BP Location: Right arm, Patient Position: Chair, Cuff Size: Adult Regular)   Pulse 68   Wt 57.2 kg (126 lb)   LMP 2009   BMI 21.63 kg/m   Estimated body mass index is 21.63 kg/m  as calculated from the following:    Height as of 3/19/18: 1.626 m (5' 4\").    Weight as of this encounter: 57.2 kg (126 lb).  BP completed using cuff size: regular    Questioned patient about current smoking habits.  Pt. has never smoked.          The following HM Due: pap smear      Nurse assisted visit.  Monica Green MA.               "

## 2019-01-23 NOTE — PROGRESS NOTES
SUBJECTIVE:  Danika Hyman is an 63 year old  P:2 postmenopausal woman who presents for annual gyn exam.      Past Medical History:   Diagnosis Date     Esophageal reflux      Osteoporosis 2017    DEXA:  lumbar -2.8, left hip -2.1, right hip -2.5     Status post bariatric surgery     vertical banded gastrosplasty at [facility]       Family History   Problem Relation Age of Onset     Arthritis Father         B:       Diabetes Father      Respiratory Father      Cardiovascular Father      Hypertension Mother      Alcohol/Drug Mother         B: alive     Lung Cancer Mother 89        lung cancer ;  age 91     Family History Negative Sister      Family History Negative Sister      Family History Negative Brother        Past Surgical History:   Procedure Laterality Date     C NONSPECIFIC PROCEDURE      wisdom teeth extraction,abstracted     C NONSPECIFIC PROCEDURE      T&A,abstracted     C NONSPECIFIC PROCEDURE      2 natural childbirths,abstracted     C NONSPECIFIC PROCEDURE      gastric stapling ast [facility]     CATARACT IOL, RT/LT  2016       Current Outpatient Medications   Medication     CALCIUM PLUS TABS   OR     CENTRUM SILVER OR     estradiol (ESTRACE) 0.1 MG/GM vaginal cream     OCUVITE EXTRA OR     omega 3 1000 MG CAPS     PROGESTERONE 100MG/G CREAM     ranitidine (ZANTAC) 150 MG tablet     TESTOSTERONE 2 MG/GM CREAM     VITAMIN B-12 500 MCG OR TABS     ANUSOL-HC 2.5 % RE CREA     estradiol (VAGIFEM) 10 MCG TABS vaginal tablet     Estradiol Cypionate POWD     zolpidem (AMBIEN) 5 MG tablet     No current facility-administered medications for this visit.      Allergies   Allergen Reactions     Codeine      Vomiting       Social History     Tobacco Use     Smoking status: Never Smoker     Smokeless tobacco: Never Used   Substance Use Topics     Alcohol use: Yes     Comment: 4 glasses a week       ROS:  CONSTITUTIONAL: NEGATIVE for fever, chills  INTEGUMENTARY/SKIN: NEGATIVE for  worrisome rashes, moles or lesions  EYES: NEGATIVE for vision changes   ENT/MOUTH: NEGATIVE for ear, mouth and throat problems  RESP: NEGATIVE for significant cough or SOB  BREAST: NEGATIVE for masses, tenderness or discharge  CV: NEGATIVE for chest pain, palpitations   GI: NEGATIVE for nausea, abdominal pain, heartburn, or change in bowel habits  : NEGATIVE for frequency, dysuria, or hematuria  MUSCULOSKELETAL: NEGATIVE for significant arthralgias or myalgia  NEURO: NEGATIVE for weakness, dizziness or paresthesias or headache  ENDOCRINE: NEGATIVE for temperature intolerance, skin/hair changes  HEME: NEGATIVE for bleeding problems  PSYCHIATRIC: NEGATIVE for changes in mood or affect    OBJECTIVE:  Exam:  GENERAL APPEARANCE: healthy, alert and no distress  EYES: EOMI,  PERRL  RESP: lungs clear to auscultation - no rales, rhonchi or wheezes  CV: regular rates and rhythm, normal S1 S2, no S3 or S4 and no murmur, click or rub -  ABDOMEN:  soft, nontender, no HSM or masses and bowel sounds normal    Pelvic Exam:  Urethra; negative  Vulva: No external lesions, normal hair distribution, no adenopathy  Vagina: Moist, pink, no abnormal discharge, well rugated, no lesions  Cervix: Pap smear is taken, parous, smooth, pink, no visible lesions  Uterus: Normal size, anteverted, non-tender, mobile   Ovaries: No mass, non-tender, mobile      ASSESSMENT/PLAN:  Gyn care/pap smear    PE: reviewed health maintenance including diet, regular exercise and periodic exams.  Health Maintenance   Topic Date Due     HIV SCREEN (SYSTEM ASSIGNED)  03/17/1975     ZOSTER IMMUNIZATION (2 of 3) 07/13/2017     INFLUENZA VACCINE (1) 09/01/2018     PAP Q5 YEARS  05/09/2019     PHQ-2 Q1 YR  03/19/2019     HPV Q5 YEARS (Complete with PAP)  05/09/2019     ADVANCE DIRECTIVE PLANNING Q5 YRS  05/09/2019     MAMMO Q1 YR  06/04/2019     LIPID SCREEN Q5 YR FEMALE (SYSTEM ASSIGNED)  06/20/2021     DTAP/TDAP/TD IMMUNIZATION (3 - Td) 06/20/2026     COLON CANCER  SCREEN (SYSTEM ASSIGNED)  07/31/2027     HEPATITIS C SCREENING  Completed     IPV IMMUNIZATION  Aged Out     MENINGITIS IMMUNIZATION  Aged Out

## 2019-01-24 ENCOUNTER — MYC MEDICAL ADVICE (OUTPATIENT)
Dept: ENDOCRINOLOGY | Facility: CLINIC | Age: 64
End: 2019-01-24

## 2019-01-28 LAB
COPATH REPORT: NORMAL
PAP: NORMAL

## 2019-01-29 LAB
FINAL DIAGNOSIS: NORMAL
HPV HR 12 DNA CVX QL NAA+PROBE: NEGATIVE
HPV16 DNA SPEC QL NAA+PROBE: NEGATIVE
HPV18 DNA SPEC QL NAA+PROBE: NEGATIVE
SPECIMEN DESCRIPTION: NORMAL
SPECIMEN SOURCE CVX/VAG CYTO: NORMAL

## 2019-01-31 DIAGNOSIS — N95.1 SYMPTOMATIC MENOPAUSAL OR FEMALE CLIMACTERIC STATES: ICD-10-CM

## 2019-01-31 RX ORDER — ESTRADIOL 0.1 MG/G
2 CREAM VAGINAL
Qty: 42.5 G | Refills: 3 | Status: SHIPPED | OUTPATIENT
Start: 2019-01-31 | End: 2019-06-26

## 2019-01-31 NOTE — PROGRESS NOTES
Fax received requesting refill of Estrace cream. Pt had OV this month. Rx approved.      Priyanka Silva RN

## 2019-02-12 DIAGNOSIS — K21.9 GASTROESOPHAGEAL REFLUX DISEASE WITHOUT ESOPHAGITIS: ICD-10-CM

## 2019-02-12 NOTE — LETTER
[facility]  [address] [id number] [phone]            Danika Hyman  [address] [place] [address]        February 13, 2019    Dear Danika,    While refilling your prescription today, we noticed that you are due for an appointment with your provider.  We will refill your prescription for 30 days, but a follow-up appointment must be made before any additional refills can be approved.     Taking care of your health is important to us and we look forward to seeing you in the near future.  Please call us at [phone] or [phone] (or use [employer]) to schedule an appointment.     Please disregard this notice if you have already made an appointment.    Sincerely,        [facility]

## 2019-02-13 NOTE — TELEPHONE ENCOUNTER
"Requested Prescriptions   Pending Prescriptions Disp Refills     ranitidine (ZANTAC) 150 MG tablet [Pharmacy Med Name: RANITIDINE 150MG TABLETS]  Last Written Prescription Date:  2/14/18  Last Fill Quantity: 180 TABLET,  # refills: 3   Last office visit: 1/29/18 with prescribing provider:  JHONATHAN   Future Office Visit:       180 tablet 0     Sig: TAKE 1 TABLET(150 MG) BY MOUTH TWICE DAILY    H2 Blockers Protocol Failed - 2/12/2019  6:06 PM       Failed - Recent (12 mo) or future (30 days) visit within the authorizing provider's specialty    Patient had office visit in the last 12 months or has a visit in the next 30 days with authorizing provider or within the authorizing provider's specialty.  See \"Patient Info\" tab in inbasket, or \"Choose Columns\" in Meds & Orders section of the refill encounter.             Passed - Patient is age 12 or older       Passed - Medication is active on med list          "

## 2019-02-20 ENCOUNTER — OFFICE VISIT (OUTPATIENT)
Dept: PEDIATRICS | Facility: CLINIC | Age: 64
End: 2019-02-20
Payer: COMMERCIAL

## 2019-02-20 VITALS
BODY MASS INDEX: 21.68 KG/M2 | HEIGHT: 64 IN | TEMPERATURE: 98.7 F | DIASTOLIC BLOOD PRESSURE: 58 MMHG | OXYGEN SATURATION: 99 % | SYSTOLIC BLOOD PRESSURE: 100 MMHG | WEIGHT: 127 LBS | HEART RATE: 76 BPM

## 2019-02-20 DIAGNOSIS — K21.9 GASTROESOPHAGEAL REFLUX DISEASE WITHOUT ESOPHAGITIS: ICD-10-CM

## 2019-02-20 PROCEDURE — 99213 OFFICE O/P EST LOW 20 MIN: CPT | Performed by: INTERNAL MEDICINE

## 2019-02-20 ASSESSMENT — MIFFLIN-ST. JEOR: SCORE: 1126.07

## 2019-02-20 NOTE — PROGRESS NOTES
SUBJECTIVE:                                                    Danika Hyman is a 63 year old female who presents to clinic today for the following health issues:    Pt presents with reflux-type symptoms. Symptoms have been present for several years, and include: heartburn, regurgitation, worse when supine and worse with spicy, caffeinated and peppermint foods.  Symptoms relieved by:zantac. .  Symptoms are well controlled.     Patient Active Problem List   Diagnosis     Gastroesophageal reflux disease without esophagitis     Status post bariatric surgery     CARDIOVASCULAR SCREENING; LDL GOAL LESS THAN 160     Advanced directives, counseling/discussion (ACP)     Osteoporosis     Personal history of drug therapy     Past Surgical History:   Procedure Laterality Date     C NONSPECIFIC PROCEDURE      wisdom teeth extraction,abstracted     C NONSPECIFIC PROCEDURE      T&A,abstracted     C NONSPECIFIC PROCEDURE      2 natural childbirths,abstracted     C NONSPECIFIC PROCEDURE      gastric stapling ast [facility]     CATARACT IOL, RT/LT  2016       Social History     Tobacco Use     Smoking status: Never Smoker     Smokeless tobacco: Never Used   Substance Use Topics     Alcohol use: Yes     Comment: 4 glasses a week     Family History   Problem Relation Age of Onset     Arthritis Father         B:       Diabetes Father      Respiratory Father      Cardiovascular Father      Hypertension Mother      Alcohol/Drug Mother         B:192 alive     Lung Cancer Mother 89        lung cancer ;  age 91     Family History Negative Sister      Family History Negative Sister      Family History Negative Brother          Current Outpatient Medications   Medication Sig Dispense Refill     ANUSOL-HC 2.5 % RE CREA apply B.I.D. or T.I.D. 1 Tube 4     CALCIUM PLUS TABS   OR 2 tabs q day       CENTRUM SILVER OR 1 tablet daily       estradiol (ESTRACE) 0.1 MG/GM vaginal cream Place 2 g vaginally twice a week 42.5 g 3      "Estradiol Cypionate POWD 0.5 mg daily Apply cream 0.5% to forearm once daily 45 g 2     OCUVITE EXTRA OR 1 TABLET DAILY       omega 3 1000 MG CAPS Take 1 g by mouth daily       PROGESTERONE 100MG/G CREAM APPLY 1/8 TEASPOONFUL TO SKIN EVERY DAY 45 g 0     ranitidine (ZANTAC) 150 MG tablet Take 1 tablet (150 mg) by mouth 2 times daily 180 tablet 3     TESTOSTERONE 2 MG/GM CREAM Apply 1/8 teaspoon to skin 2-3 x week 120 g 3     VITAMIN B-12 500 MCG OR TABS 1 sublingual qday 30 5         OBJECTIVE:                                                    /58 (Cuff Size: Adult Regular)   Pulse 76   Temp 98.7  F (37.1  C) (Oral)   Ht 1.626 m (5' 4\")   Wt 57.6 kg (127 lb)   LMP 04/17/2009   SpO2 99%   BMI 21.80 kg/m   Body mass index is 21.8 kg/m .  GENERAL:  alert,  no distress       ASSESSMENT/PLAN:                                                        ICD-10-CM    1. Gastroesophageal reflux disease without esophagitis K21.9 ranitidine (ZANTAC) 150 MG tablet      Adequate control.  Continue current regimen without changes.  15 minutes spent face-to-face with patient today with greater than 50% of that time spent in counseling and coordination of care regarding the issues above.     Dexter Floyd MD  [facility]    "

## 2019-03-06 DIAGNOSIS — N95.1 SYMPTOMATIC MENOPAUSAL OR FEMALE CLIMACTERIC STATES: ICD-10-CM

## 2019-03-06 NOTE — TELEPHONE ENCOUNTER
Requested Prescriptions   Pending Prescriptions Disp Refills     PROGESTERONE 100MG/G CREAM        Last Written Prescription Date:  12/17/2018  Last Fill Quantity: 45 g,   # refills: 0  Last Office Visit: 2/20/2019  Future Office visit:       Routing refill request to provider for review/approval because:  Drug not on the [facility], [facility] or [facility] refill protocol or controlled substance   45 g 0     Sig: APPLY 1/8 TEASPOONFUL TO SKIN EVERY DAY    There is no refill protocol information for this order

## 2019-05-01 DIAGNOSIS — N95.1 SYMPTOMATIC MENOPAUSAL OR FEMALE CLIMACTERIC STATES: ICD-10-CM

## 2019-05-01 RX ORDER — ESTRADIOL CYPIONATE
0.5 POWDER (GRAM) MISCELLANEOUS DAILY
Qty: 45 G | Refills: 2 | Status: SHIPPED | OUTPATIENT
Start: 2019-05-01 | End: 2019-07-29

## 2019-05-01 NOTE — TELEPHONE ENCOUNTER
Patient is looking to get a refill for her Estradiol 0.05% in vanicream.     Thanks!      Lucian Godinez  Compounding Pharmacy Technician  [facility]   [address] [place] [id number]   Phone: [phone]  Fax: [phone]

## 2019-05-10 ENCOUNTER — MYC MEDICAL ADVICE (OUTPATIENT)
Dept: PEDIATRICS | Facility: CLINIC | Age: 64
End: 2019-05-10

## 2019-05-10 ENCOUNTER — TELEPHONE (OUTPATIENT)
Dept: PEDIATRICS | Facility: CLINIC | Age: 64
End: 2019-05-10

## 2019-05-10 NOTE — TELEPHONE ENCOUNTER
Pt sent a [employer] message requesting recommendation on health management options for weight loss.  See [employer] message.    Please advise-    Shelby Flood RN - [facility]

## 2019-05-13 ENCOUNTER — MYC MEDICAL ADVICE (OUTPATIENT)
Dept: PEDIATRICS | Facility: CLINIC | Age: 64
End: 2019-05-13

## 2019-05-15 NOTE — TELEPHONE ENCOUNTER
Please review the [facility] message from pt & advise. LOV was on 2/20/19.     Tatianna WILEY, RN  Triage Nurse

## 2019-05-23 ENCOUNTER — MYC MEDICAL ADVICE (OUTPATIENT)
Dept: ENDOCRINOLOGY | Facility: CLINIC | Age: 64
End: 2019-05-23

## 2019-05-23 DIAGNOSIS — M81.0 OSTEOPOROSIS, UNSPECIFIED OSTEOPOROSIS TYPE, UNSPECIFIED PATHOLOGICAL FRACTURE PRESENCE: Primary | ICD-10-CM

## 2019-05-28 NOTE — TELEPHONE ENCOUNTER
Patient requesting dexa order, wondering if she also needs an appointment for her reclast infusion and if any blood work would be needed before her appointment. Please see [employer] message and advise.     Thank you.

## 2019-05-28 NOTE — TELEPHONE ENCOUNTER
Last visit 3/2018    Last DEXA 5/2018- so she is not due for DEXA. Usually its every 2 years.    She also needs follow up in clinic.    Please inform patient.

## 2019-06-07 DIAGNOSIS — R68.82 DECREASED LIBIDO: ICD-10-CM

## 2019-06-08 ENCOUNTER — ANCILLARY PROCEDURE (OUTPATIENT)
Dept: MAMMOGRAPHY | Facility: CLINIC | Age: 64
End: 2019-06-08
Attending: INTERNAL MEDICINE
Payer: COMMERCIAL

## 2019-06-08 DIAGNOSIS — Z12.31 VISIT FOR SCREENING MAMMOGRAM: ICD-10-CM

## 2019-06-08 PROCEDURE — 77063 BREAST TOMOSYNTHESIS BI: CPT | Mod: TC

## 2019-06-08 PROCEDURE — 77067 SCR MAMMO BI INCL CAD: CPT | Mod: TC

## 2019-06-08 NOTE — TELEPHONE ENCOUNTER
Requested Prescriptions   Pending Prescriptions Disp Refills     TESTOSTERONE 2 MG/GM CREAM  Last Written Prescription Date:  10/31/2018  Last Fill Quantity: 120 g,  # refills: 3    Last Office Visit: 2/20/2019 Dexter Floyd MD       Future Office Visit:      120 g 3     Sig: Apply 1/8 teaspoon to skin 2-3 x week       There is no refill protocol information for this order

## 2019-06-10 NOTE — TELEPHONE ENCOUNTER
Routing refill request to provider for review/approval because:  Drug not on the [facility] refill protocol   Belinda ZAVALA RN - Triage  [facility]

## 2019-06-12 ENCOUNTER — HOSPITAL ENCOUNTER (OUTPATIENT)
Dept: ULTRASOUND IMAGING | Facility: CLINIC | Age: 64
Discharge: HOME | End: 2019-06-12
Attending: INTERNAL MEDICINE | Admitting: INTERNAL MEDICINE
Payer: COMMERCIAL

## 2019-06-12 DIAGNOSIS — R92.8 ABNORMAL MAMMOGRAM: ICD-10-CM

## 2019-06-12 PROCEDURE — 76642 ULTRASOUND BREAST LIMITED: CPT | Mod: LT

## 2019-06-26 DIAGNOSIS — N95.1 SYMPTOMATIC MENOPAUSAL OR FEMALE CLIMACTERIC STATES: ICD-10-CM

## 2019-06-26 RX ORDER — ESTRADIOL 0.1 MG/G
2 CREAM VAGINAL
Qty: 42.5 G | Refills: 3 | Status: SHIPPED | OUTPATIENT
Start: 2019-06-27 | End: 2020-01-20

## 2019-06-26 NOTE — TELEPHONE ENCOUNTER
Prescription approved per [facility]./Dr Richter.  Due for annual appt in Jan. 2020  Ronda Trinidad RN

## 2019-06-26 NOTE — TELEPHONE ENCOUNTER
estradiol      Last Written Prescription Date:  1/31/19  Last Fill Quantity: 42.5 g,   # refills: 3  Last Office Visit: 1/23/19  Future Office visit:

## 2019-06-27 ENCOUNTER — MYC MEDICAL ADVICE (OUTPATIENT)
Dept: PEDIATRICS | Facility: CLINIC | Age: 64
End: 2019-06-27

## 2019-06-27 NOTE — TELEPHONE ENCOUNTER
Pt sent a [employer] message in with immunization updates.  Pt's immunization record updated.    Shelby Flood RN - [facility]

## 2019-07-10 DIAGNOSIS — N95.1 SYMPTOMATIC MENOPAUSAL OR FEMALE CLIMACTERIC STATES: ICD-10-CM

## 2019-07-10 NOTE — TELEPHONE ENCOUNTER
Progesterone 100mg/g      Last Written Prescription Date:  3/9/2019  Last Fill Quantity: 45 g,   # refills: 1  Last Office Visit: 2/20/19  Future Office visit:       Routing refill request to provider for review/approval because:  Drug not on the [facility], [facility] or [facility] refill protocol or controlled substance    Lynette SARAVIA RN

## 2019-07-29 ENCOUNTER — OFFICE VISIT (OUTPATIENT)
Dept: ENDOCRINOLOGY | Facility: CLINIC | Age: 64
End: 2019-07-29
Payer: COMMERCIAL

## 2019-07-29 VITALS
BODY MASS INDEX: 21.12 KG/M2 | OXYGEN SATURATION: 93 % | WEIGHT: 123.7 LBS | HEIGHT: 64 IN | DIASTOLIC BLOOD PRESSURE: 65 MMHG | TEMPERATURE: 97.6 F | HEART RATE: 66 BPM | RESPIRATION RATE: 16 BRPM | SYSTOLIC BLOOD PRESSURE: 99 MMHG

## 2019-07-29 DIAGNOSIS — M81.0 OSTEOPOROSIS, UNSPECIFIED OSTEOPOROSIS TYPE, UNSPECIFIED PATHOLOGICAL FRACTURE PRESENCE: Primary | ICD-10-CM

## 2019-07-29 DIAGNOSIS — N95.1 SYMPTOMATIC MENOPAUSAL OR FEMALE CLIMACTERIC STATES: ICD-10-CM

## 2019-07-29 PROCEDURE — 99214 OFFICE O/P EST MOD 30 MIN: CPT | Performed by: INTERNAL MEDICINE

## 2019-07-29 PROCEDURE — 36415 COLL VENOUS BLD VENIPUNCTURE: CPT | Performed by: INTERNAL MEDICINE

## 2019-07-29 PROCEDURE — 82306 VITAMIN D 25 HYDROXY: CPT | Performed by: INTERNAL MEDICINE

## 2019-07-29 RX ORDER — ZOLEDRONIC ACID 5 MG/100ML
5 INJECTION, SOLUTION INTRAVENOUS ONCE
Status: CANCELLED
Start: 2019-07-29

## 2019-07-29 RX ORDER — HEPARIN SODIUM,PORCINE 10 UNIT/ML
5 VIAL (ML) INTRAVENOUS
Status: CANCELLED | OUTPATIENT
Start: 2019-07-29

## 2019-07-29 RX ORDER — ESTRADIOL CYPIONATE
0.5 POWDER (GRAM) MISCELLANEOUS DAILY
Qty: 45 G | Refills: 2 | COMMUNITY
Start: 2019-05-01 | End: 2019-12-10

## 2019-07-29 RX ORDER — HEPARIN SODIUM (PORCINE) LOCK FLUSH IV SOLN 100 UNIT/ML 100 UNIT/ML
5 SOLUTION INTRAVENOUS
Status: CANCELLED | OUTPATIENT
Start: 2019-07-29

## 2019-07-29 ASSESSMENT — MIFFLIN-ST. JEOR: SCORE: 1106.1

## 2019-07-29 NOTE — NURSING NOTE
ENDOCRINOLOGY INTAKE FORM    Patient Name:  Danika Hyman  :  1955    Is patient Diabetic?   No  Does patient have non-diabetic or other endocrine issues?  Yes: osteoporosis    Vitals: LMP 2009   BMI= There is no height or weight on file to calculate BMI.    Smoking and Alcohol use:  Social History     Tobacco Use     Smoking status: Never Smoker     Smokeless tobacco: Never Used   Substance Use Topics     Alcohol use: Yes     Comment: 4 glasses a week     Drug use: No       Maya Funez CMA  [facility]  [place]

## 2019-07-29 NOTE — PROGRESS NOTES
Name: Danika Hyman  Date: 3/19/2018  Seen for f/u of osteoporosis.   Last visit 3/2018.  HPI:  Danika Hyman is a 64 year old female who presents for the evaluation of osteoperosis.  Other past medical history includes history of gastric bypass surgery, esophageal reflux disease.    H/o osteopenia- in 50s.  DEXA 2017- osteoporosis  Following that she was seen at [facility] [place].  At that time given her history of GERD and gastric bypass surgery, intravenous therapy was considered. Never been on oral anti-reoprtive therapy.    Started Reclast-- June 2017.  Received Reclast July 2018.  Tolerated well.  Last DEXA  2018- showing- suggestion of a possible trend towards improvement of the lumbar spine, and no significant change of the total hip.    H/o Gastric bypass at age 26 years.  Lost 80 lbs at that time and was able to keep it off.  H/o bulimia before that.  On HRT- followed by OBGYN. Is on testosterone replacement as well as estradiol.    Smoke:No  Family History: mother   Menstrual history/Birthing:   Fractures:in last 10 year- fracture metatarsal after a fall  Kidney stones: No  GI Surgery:h/o gastric bypass in past in 1982  Duration of therapy: Reclast X 1 time (2017)  Exercise: Does 43845 steps/day, does lifts weights.   Diet:   Milk: Minimal   Cheese: Minimal     Ca/Vitamin D: Calcium- 1500 mg/day , vit D 1800 IU/day.  Alcohol:  rare  Eating Disorder: Yes: h/o bulimia as teenager  Steroid Use:  No  PMH/PSH:  Past Medical History:   Diagnosis Date     Esophageal reflux      Osteoporosis 05/04/2017    DEXA:  lumbar -2.8, left hip -2.1, right hip -2.5     Status post bariatric surgery 1982    vertical banded gastrosplasty at [facility]     Past Surgical History:   Procedure Laterality Date     C NONSPECIFIC PROCEDURE      wisdom teeth extraction,abstracted     C NONSPECIFIC PROCEDURE      T&A,abstracted     C NONSPECIFIC PROCEDURE      2 natural childbirths,abstracted     C NONSPECIFIC PROCEDURE   1982    gastric stapling ast [facility]     CATARACT IOL, RT/LT  2016     Family Hx:  Family History   Problem Relation Age of Onset     Arthritis Father         B:       Diabetes Father      Respiratory Father      Cardiovascular Father      Hypertension Mother      Alcohol/Drug Mother         B:192 alive     Lung Cancer Mother 89        lung cancer ;  age 91     Family History Negative Sister      Family History Negative Sister      Family History Negative Brother              Social Hx:  Social History     Socioeconomic History     Marital status:      Spouse name: Not on file     Number of children: Not on file     Years of education: Not on file     Highest education level: Not on file   Occupational History     Occupation:    Social Needs     Financial resource strain: Not on file     Food insecurity:     Worry: Not on file     Inability: Not on file     Transportation needs:     Medical: Not on file     Non-medical: Not on file   Tobacco Use     Smoking status: Never Smoker     Smokeless tobacco: Never Used   Substance and Sexual Activity     Alcohol use: Yes     Comment: 4 glasses a week     Drug use: No     Sexual activity: Yes     Partners: Male   Lifestyle     Physical activity:     Days per week: Not on file     Minutes per session: Not on file     Stress: Not on file   Relationships     Social connections:     Talks on phone: Not on file     Gets together: Not on file     Attends [employer] service: Not on file     Active member of club or organization: Not on file     Attends meetings of clubs or organizations: Not on file     Relationship status: Not on file     Intimate partner violence:     Fear of current or ex partner: Not on file     Emotionally abused: Not on file     Physically abused: Not on file     Forced sexual activity: Not on file   Other Topics Concern     Parent/sibling w/ CABG, MI or angioplasty before 65F 55M? Not Asked   Social History Narrative     Not  "on file          MEDICATIONS:  has a current medication list which includes the following prescription(s): calcium & phosphate w/ vit d & iron, multiple vitamins-minerals, estradiol, estradiol cypionate, progesterone, ranitidine, testosterone, UNABLE TO FIND, and cyanocobalamin.    ROS     ROS: 10 point ROS neg other than the symptoms noted above in the HPI.    Physical Exam   VS: BP 99/65 (BP Location: Right arm, Patient Position: Chair, Cuff Size: Adult Regular)   Pulse 66   Temp 97.6  F (36.4  C) (Oral)   Resp 16   Ht 1.626 m (5' 4\")   Wt 56.1 kg (123 lb 11.2 oz)   LMP 04/17/2009   SpO2 93%   Breastfeeding? No   BMI 21.23 kg/m      GENERAL: AXOX3, NAD, well dressed, answering questions appropriately, appears stated age.  HEENT: No exopthalmous, no proptosis, EOMI, no lig lag, no retraction  NECK: Thyroid normal in size, non tender, no nodules were palpated.  CV: RRR  LUNGS: CTAB  ABDOMEN: +BS  NEUROLOGY: CN grossly intact, no tremors  SPINE: no TTP  PSYCH: normal affect and mood      LABS:  TFTs:  TSH   Date Value Ref Range Status   06/20/2016 2.89 0.40 - 4.00 mU/L Final       PTH:   ENDO CALCIUM LABS-UMP Latest Ref Rng & Units 3/19/2018   PARATHYROID HORMONE INTACT 18 - 80 pg/mL 41       Vitamin D:  Vitamin D Deficiency Screening Results:  Lab Results   Component Value Date    VITDT 55 03/19/2018    VITDT 57 06/20/2016    VITDT 52 03/06/2013       DEXA 5/2017:  RISK FACTORS: Post-menopausal, Parent history of osteoporosis, Fracture of bone on top of foot at age 54, fell off step while gardening, Condition related to bone loss: gastric bypass, Follow-up osteopenia     CURRENT TREATMENT: Calcium, Vitamin D, Estrogen, Testosterone      FINDINGS:  Lumbar Spine (L1-L4) T-score: -2.8  Left Total Hip   T-score: -2.1  Right Total Hip   T-score: -2.5      Lumbar (L1-L4) BMD: 0.850 Previous: 0.987   Total Hip Mean BMD: 0.722 Previous: 0.814      Comparison is made to another DXA performed on a different [employer] " machine since 2007        IMPRESSION  Osteoporosis  Degenerative changes of the spine  Recommendations include ensuring adequate daily Calcium and Vitamin D intake  Follow up scan can be considered in three years.     Comparisons from different scanners that have not been cross calibrated, are not necessarily valid. Such a comparison has been performed here; one should interpret with caution.  Compared to previous bone densitometry performed on this patient, there is the suggestion of a possible trend towards worsening of the lumbar spine, and a possible trend towards worsening of the total hip.     Current NOF guidelines recommend treatment for patients with the following:  - Prior hip or vertebral fracture  - T-score -2.5 or below  - A 10 year risk of any major osteoporotic fracture >20% or 10 year risk of hip fracture >3%, as calculated using the FRAX calculator (www.shef.ac.uk/FRAX).       Based on these guidelines, treatment (in addition to calcium and vitamin D) is recommended for this patient, after ruling out other causes of osteoporosis/low bone density.  While this is meant as an aid to clinical decision-making, clinical judgment must still be used.      DEXA 5/2018:  RISK FACTORS:  Post-menopausal, Parent history of osteoporosis, Fracture of foot 2008, Condition related to bone loss: gastric bypass, Follow-up osteoporosis     CURRENT TREATMENT:  Calcium, Vitamin D, Estrogen, Reclast (zoledronic acid), Testosterone     FINDINGS:               Lumbar Spine (L1-L4)      T-score:  -2.4               Left Femoral Neck                      T-score:  -1.7               Right Femoral Neck                    T-score:  -2.1                             Lumbar (L1-L4) BMD: 0.892            Previous: 0.850                                              Total Hip Mean BMD: 0.750            Previous: 0.722     Comparison is made to another DXA performed on the same [employer]  machine on  05/04/2017.          IMPRESSION  Osteopenia (low bone mass)  Degenerative changes of the spine  Recommendations include ensuring adequate daily Calcium and Vitamin D intake  Follow up scan can be considered in 3 years.     Comparisons are not necessarily valid when precision within the machine has not been determined. Such a comparison has been performed here; one should interpret with caution.   Compared to previous bone densitometry performed on this patient, there is the suggestion of a possible trend towards improvement of the lumbar spine, and no significant change of the total hip.     Current NOF guidelines recommend treatment for patients with the following:  - Prior hip or vertebral fracture  - T-score -2.5 or below  - A 10 year risk of any major osteoporotic fracture >20% or 10 year risk of hip fracture >3%, as calculated using the FRAX calculator (www.shef.ac.uk/FRAX).       FRAX calculation is validated for people who are not on treatment.       All pertinent notes, labs, and images personally reviewed by me.   All pertinent notes, labs and reports done at outside clinic personally reviewed by me.      A/P  Ms.Nicole JOSETTE Hyman is a 64 year old here for the evaluation of:    #1 Osteoporosis:    Risk factors for low bone density include family history, , female, s/p gastric bypass, low BMI, Estrogen deficiency, low Ca intake.  DEXA 2017 consistent with osteoporosis  She received Reclast in June 2017, July 2018 and tolerated well.  Given history of gastric bypass as well as GERD will recommend to continue with intravenous treatment.  Plan for Reclast in July/August 2019  DEXA scan in 2020- based on insurance.       #2. H/o gastric bypass:  On vit D replacement.  Recheck vit D levels.    The pt was advised to    Maintain an adequate calcium and vitamin D intake and to supplement vitamin D if needed to maintain serum levels of 25 hydroxy D (25 OH D) between 30-60 ng/ml.    Limit alcohol intake to no more  than 2 servings per day.    Limit caffeine intake.    Maintain an active lifestyle including weight-bearing exercises for at least 30 mins daily.    Take measures to reduce the risk of falling.    Instructions on RECLAT. Treatment with bisphosphonate therapy will decrease fracture risk 50-70%.   There is risk of osteonecrosis of the jaw in patients using bisphosphonates is approximately 1/1700-1/100,000, with development most likely related to invasive dental procedures. If an invasive dental procedure was necessary, preferably stop the bisphosphonate approximately 3 months prior to reduce the risk. Let your dentist know that you are on this medication.  The data available at this time suggests that there is probably a small increase risk of atypical (nontraumatic) subtrochanteric fractures of the femur in patients on bisphosphonate therapy compared to those not on it. One large study suggested that for every 100 fractures prevented with bisphosphonate therapy, less than one femur fracture will occur. Other studies suggest one episode per 2,500 patient years. Patient should call with leg pain.      Discussed indications, risks and benefits of all medications prescribed, and answered questions to patient's satisfaction.      More than 50% of the time spent with Ms. Hyman on counseling / coordinating her care.      Follow-up:  1 year    Fe Reyna MD  Endocrinology  [facility]  CC: Dexter Floyd    Addendum to above note and clinic visit:    Labs reviewed.    See result note/telephone encounter.

## 2019-07-29 NOTE — LETTER
7/29/2019         RE: Danika Hyman  [address] [place] [address]        Dear Colleague,    [facility] you [facility] your [facility], [facility]yman[facility]. Please see a copy of my visit note below.    Name: Danika Hyman  Date: 3/19/2018  Seen for f/u of osteoporosis.   Last visit 3/2018.  HPI:  Danika Hyman is a 64 year old female who presents for the evaluation of osteoperosis.  Other past medical history includes history of gastric bypass surgery, esophageal reflux disease.    H/o osteopenia- in 50s.  DEXA 2017- osteoporosis  Following that she was seen at [facility] of [place].  At that time given her history of GERD and gastric bypass surgery, intravenous therapy was considered. Never been on oral anti-reoprtive therapy.    Started Reclast-- June 2017.  Received Reclast July 2018.  Tolerated well.  Last DEXA  2018- showing- suggestion of a possible trend towards improvement of the lumbar spine, and no significant change of the total hip.    H/o Gastric bypass at age 26 years.  Lost 80 lbs at that time and was able to keep it off.  H/o bulimia before that.  On HRT- followed by OBGYN. Is on testosterone replacement as well as estradiol.    Smoke:No  Family History: mother   Menstrual history/Birthing:   Fractures:in last 10 year- fracture metatarsal after a fall  Kidney stones: No  GI Surgery:h/o gastric bypass in past in 1982  Duration of therapy: Reclast X 1 time (2017)  Exercise: Does 29721 steps/day, does lifts weights.   Diet:   Milk: Minimal   Cheese: Minimal     Ca/Vitamin D: Calcium- 1500 mg/day , vit D 1800 IU/day.  Alcohol:  rare  Eating Disorder: Yes: h/o bulimia as teenager  Steroid Use:  No  PMH/PSH:  Past Medical History:   Diagnosis Date     Esophageal reflux      Osteoporosis 05/04/2017    DEXA:  lumbar -2.8, left hip -2.1, right hip -2.5     Status post bariatric surgery 1982    vertical banded gastrosplasty at [facility]     Past Surgical History:    Procedure Laterality Date     C NONSPECIFIC PROCEDURE      wisdom teeth extraction,abstracted     C NONSPECIFIC PROCEDURE      T&A,abstracted     C NONSPECIFIC PROCEDURE      2 natural childbirths,abstracted     C NONSPECIFIC PROCEDURE  1982    gastric stapling ast [facility]     CATARACT IOL, RT/LT  2016     Family Hx:  Family History   Problem Relation Age of Onset     Arthritis Father         B:       Diabetes Father      Respiratory Father      Cardiovascular Father      Hypertension Mother      Alcohol/Drug Mother         B: alive     Lung Cancer Mother 89        lung cancer ;  age 91     Family History Negative Sister      Family History Negative Sister      Family History Negative Brother              Social Hx:  Social History     Socioeconomic History     Marital status:      Spouse name: Not on file     Number of children: Not on file     Years of education: Not on file     Highest education level: Not on file   Occupational History     Occupation:    Social Needs     Financial resource strain: Not on file     Food insecurity:     Worry: Not on file     Inability: Not on file     Transportation needs:     Medical: Not on file     Non-medical: Not on file   Tobacco Use     Smoking status: Never Smoker     Smokeless tobacco: Never Used   Substance and Sexual Activity     Alcohol use: Yes     Comment: 4 glasses a week     Drug use: No     Sexual activity: Yes     Partners: Male   Lifestyle     Physical activity:     Days per week: Not on file     Minutes per session: Not on file     Stress: Not on file   Relationships     Social connections:     Talks on phone: Not on file     Gets together: Not on file     Attends Mandaen service: Not on file     Active member of club or organization: Not on file     Attends meetings of clubs or organizations: Not on file     Relationship status: Not on file     Intimate partner violence:     Fear of current or ex partner: Not on file  "    Emotionally abused: Not on file     Physically abused: Not on file     Forced sexual activity: Not on file   Other Topics Concern     Parent/sibling w/ CABG, MI or angioplasty before 65F 55M? Not Asked   Social History Narrative     Not on file          MEDICATIONS:  has a current medication list which includes the following prescription(s): calcium & phosphate w/ vit d & iron, multiple vitamins-minerals, estradiol, estradiol cypionate, progesterone, ranitidine, testosterone, UNABLE TO FIND, and cyanocobalamin.    ROS     ROS: 10 point ROS neg other than the symptoms noted above in the HPI.    Physical Exam   VS: BP 99/65 (BP Location: Right arm, Patient Position: Chair, Cuff Size: Adult Regular)   Pulse 66   Temp 97.6  F (36.4  C) (Oral)   Resp 16   Ht 1.626 m (5' 4\")   Wt 56.1 kg (123 lb 11.2 oz)   LMP 04/17/2009   SpO2 93%   Breastfeeding? No   BMI 21.23 kg/m       GENERAL: AXOX3, NAD, well dressed, answering questions appropriately, appears stated age.  HEENT: No exopthalmous, no proptosis, EOMI, no lig lag, no retraction  NECK: Thyroid normal in size, non tender, no nodules were palpated.  CV: RRR  LUNGS: CTAB  ABDOMEN: +BS  NEUROLOGY: CN grossly intact, no tremors  SPINE: no TTP  PSYCH: normal affect and mood      LABS:  TFTs:  TSH   Date Value Ref Range Status   06/20/2016 2.89 0.40 - 4.00 mU/L Final       PTH:   ENDO CALCIUM LABS-[facility] Latest Ref Rng & Units 3/19/2018   PARATHYROID HORMONE INTACT 18 - 80 pg/mL 41       Vitamin D:  Vitamin D Deficiency Screening Results:  Lab Results   Component Value Date    VITDT 55 03/19/2018    VITDT 57 06/20/2016    VITDT 52 03/06/2013       DEXA 5/2017:  RISK FACTORS: Post-menopausal, Parent history of osteoporosis, Fracture of bone on top of foot at age 54, fell off step while gardening, Condition related to bone loss: gastric bypass, Follow-up osteopenia     CURRENT TREATMENT: Calcium, Vitamin D, Estrogen, Testosterone      FINDINGS:  Lumbar Spine (L1-L4) " T-score: -2.8  Left Total Hip   T-score: -2.1  Right Total Hip   T-score: -2.5      Lumbar (L1-L4) BMD: 0.850 Previous: 0.987   Total Hip Mean BMD: 0.722 Previous: 0.814      Comparison is made to another DXA performed on a different [employer] machine since 2007        IMPRESSION  Osteoporosis  Degenerative changes of the spine  Recommendations include ensuring adequate daily Calcium and Vitamin D intake  Follow up scan can be considered in three years.     Comparisons from different scanners that have not been cross calibrated, are not necessarily valid. Such a comparison has been performed here; one should interpret with caution.  Compared to previous bone densitometry performed on this patient, there is the suggestion of a possible trend towards worsening of the lumbar spine, and a possible trend towards worsening of the total hip.     Current NOF guidelines recommend treatment for patients with the following:  - Prior hip or vertebral fracture  - T-score -2.5 or below  - A 10 year risk of any major osteoporotic fracture >20% or 10 year risk of hip fracture >3%, as calculated using the FRAX calculator (www.shef.ac.uk/FRAX).       Based on these guidelines, treatment (in addition to calcium and vitamin D) is recommended for this patient, after ruling out other causes of osteoporosis/low bone density.  While this is meant as an aid to clinical decision-making, clinical judgment must still be used.      DEXA 5/2018:  RISK FACTORS:  Post-menopausal, Parent history of osteoporosis, Fracture of foot 2008, Condition related to bone loss: gastric bypass, Follow-up osteoporosis     CURRENT TREATMENT:  Calcium, Vitamin D, Estrogen, Reclast (zoledronic acid), Testosterone     FINDINGS:               Lumbar Spine (L1-L4)      T-score:  -2.4               Left Femoral Neck                      T-score:  -1.7               Right Femoral Neck                    T-score:  -2.1                             Lumbar (L1-L4) BMD:  0.892            Previous: 0.850                                              Total Hip Mean BMD: 0.750            Previous: 0.722     Comparison is made to another DXA performed on the same [employer]  machine on 05/04/2017.          IMPRESSION  Osteopenia (low bone mass)  Degenerative changes of the spine  Recommendations include ensuring adequate daily Calcium and Vitamin D intake  Follow up scan can be considered in 3 years.     Comparisons are not necessarily valid when precision within the machine has not been determined. Such a comparison has been performed here; one should interpret with caution.   Compared to previous bone densitometry performed on this patient, there is the suggestion of a possible trend towards improvement of the lumbar spine, and no significant change of the total hip.     Current NOF guidelines recommend treatment for patients with the following:  - Prior hip or vertebral fracture  - T-score -2.5 or below  - A 10 year risk of any major osteoporotic fracture >20% or 10 year risk of hip fracture >3%, as calculated using the FRAX calculator (www.shef.ac.uk/FRAX).       FRAX calculation is validated for people who are not on treatment.       All pertinent notes, labs, and images personally reviewed by me.   All pertinent notes, labs and reports done at outside clinic personally reviewed by me.      A/P  Ms.Nicole JOSETTE Hyman is a 64 year old here for the evaluation of:    #1 Osteoporosis:    Risk factors for low bone density include family history, , female, s/p gastric bypass, low BMI, Estrogen deficiency, low Ca intake.  DEXA 2017 consistent with osteoporosis  She received Reclast in June 2017, July 2018 and tolerated well.  Given history of gastric bypass as well as GERD will recommend to continue with intravenous treatment.  Plan for Reclast in July/August 2019  DEXA scan in 2020- based on insurance.       #2. H/o gastric bypass:  On vit D replacement.  Recheck vit D  levels.    The pt was advised to    Maintain an adequate calcium and vitamin D intake and to supplement vitamin D if needed to maintain serum levels of 25 hydroxy D (25 OH D) between 30-60 ng/ml.    Limit alcohol intake to no more than 2 servings per day.    Limit caffeine intake.    Maintain an active lifestyle including weight-bearing exercises for at least 30 mins daily.    Take measures to reduce the risk of falling.    Instructions on RECLAT. Treatment with bisphosphonate therapy will decrease fracture risk 50-70%.   There is risk of osteonecrosis of the jaw in patients using bisphosphonates is approximately 1/1700-1/100,000, with development most likely related to invasive dental procedures. If an invasive dental procedure was necessary, preferably stop the bisphosphonate approximately 3 months prior to reduce the risk. Let your dentist know that you are on this medication.  The data available at this time suggests that there is probably a small increase risk of atypical (nontraumatic) subtrochanteric fractures of the femur in patients on bisphosphonate therapy compared to those not on it. One large study suggested that for every 100 fractures prevented with bisphosphonate therapy, less than one femur fracture will occur. Other studies suggest one episode per 2,500 patient years. Patient should call with leg pain.      Discussed indications, risks and benefits of all medications prescribed, and answered questions to patient's satisfaction.      More than 50% of the time spent with Ms. Hyman on counseling / coordinating her care.      Follow-up:  1 year    Fe Reyna MD  Endocrinology  [facility]  CC: Dexter Floyd    Addendum to above note and clinic visit:    Labs reviewed.    See result note/telephone encounter.            Again, thank you for allowing me to participate in the care of your patient.        Sincerely,        Fe Reyna MD

## 2019-07-29 NOTE — PATIENT INSTRUCTIONS
[facility] & [place] locations   Dr Reyna, Endocrinology Department      [facility]   [address]  [place] [address]  Appointment [phone]  Fax: [phone]  [place]: Monday and Tuesday         [facility]   [address]  [place] [address]  Appointment [phone]  Fax: [phone]  [place]: Wednesday and Thursday            Plans RECLAST through infusion center in next few weeks.  DEXA in 2020 ([facility])  Follow up after DEXA in .  Check cost of both with insurance.      Infusion center- [facility].                [phone]   Infusion center- [facility].                [phone]     The pt was advised to    Maintain an adequate calcium and vitamin D intake and to supplement vitamin D if needed to maintain serum levels of 25 hydroxy D (25 OH D) between 30-60 ng/ml.    Limit alcohol intake to no more than 2 servings per day.    Limit caffeine intake.    Maintain an active lifestyle including weight-bearing exercises for at least 30 mins daily.    Take measures to reduce the risk of falling.    You should get 1200 mg/day calcium in divided doses of no more than 500 mg/dose.  This INCLUDES what is in your food as well as what is in any supplements you may be taking.    Dietary sources of calcium:: These also contain vitamin D  Milk                            8 oz            300 mg calcium  Yogurt                          1 cup           400 mg calcium   Hard cheese                     1.5 oz          300 mg  Cottage cheese                  2 cup           300 mg  Orange juice with Calcium       8 oz            300 mg  Low fat dairy sources are recommended      You should get 30 minutes of moderate weight bearing exercise on most days of the week .  Weight bearing exercise includes such things as walking, jogging, hiking, dancing.  You should also  get Strength training 2 or more times/week in addition to other weight -being exercise. Strength training uses weight or resistance beyond that seen in everyday activities -(pilates, weight training with free weights, weight machines or resistance bands)

## 2019-07-31 LAB — DEPRECATED CALCIDIOL+CALCIFEROL SERPL-MC: 41 UG/L (ref 20–75)

## 2019-07-31 NOTE — RESULT ENCOUNTER NOTE
Recently done labs in endocrinology clinic are in acceptable range.      Follow up as discussed in last clinic visit.    Please call and update patient.    Fe Reyna MD  Endocrinology   [facility]  July 31, 2019

## 2019-08-01 ENCOUNTER — INFUSION THERAPY VISIT (OUTPATIENT)
Dept: INFUSION THERAPY | Facility: CLINIC | Age: 64
End: 2019-08-01
Attending: INTERNAL MEDICINE
Payer: COMMERCIAL

## 2019-08-01 ENCOUNTER — HOSPITAL ENCOUNTER (OUTPATIENT)
Facility: CLINIC | Age: 64
Setting detail: SPECIMEN
Discharge: HOME | End: 2019-08-01
Attending: INTERNAL MEDICINE | Admitting: INTERNAL MEDICINE
Payer: COMMERCIAL

## 2019-08-01 VITALS
DIASTOLIC BLOOD PRESSURE: 68 MMHG | OXYGEN SATURATION: 95 % | SYSTOLIC BLOOD PRESSURE: 107 MMHG | RESPIRATION RATE: 16 BRPM | TEMPERATURE: 98 F | BODY MASS INDEX: 21.18 KG/M2 | WEIGHT: 123.4 LBS | HEART RATE: 65 BPM

## 2019-08-01 DIAGNOSIS — M81.0 OSTEOPOROSIS, UNSPECIFIED OSTEOPOROSIS TYPE, UNSPECIFIED PATHOLOGICAL FRACTURE PRESENCE: ICD-10-CM

## 2019-08-01 DIAGNOSIS — Z98.84 STATUS POST BARIATRIC SURGERY: ICD-10-CM

## 2019-08-01 DIAGNOSIS — Z92.29 PERSONAL HISTORY OF DRUG THERAPY: Primary | ICD-10-CM

## 2019-08-01 LAB
CALCIUM SERPL-MCNC: 8.9 MG/DL (ref 8.5–10.1)
CREAT SERPL-MCNC: 0.55 MG/DL (ref 0.52–1.04)
GFR SERPL CREATININE-BSD FRML MDRD: >90 ML/MIN/{1.73_M2}

## 2019-08-01 PROCEDURE — 96374 THER/PROPH/DIAG INJ IV PUSH: CPT

## 2019-08-01 PROCEDURE — 25000128 H RX IP 250 OP 636: Performed by: INTERNAL MEDICINE

## 2019-08-01 PROCEDURE — 36415 COLL VENOUS BLD VENIPUNCTURE: CPT

## 2019-08-01 PROCEDURE — 82565 ASSAY OF CREATININE: CPT | Performed by: INTERNAL MEDICINE

## 2019-08-01 PROCEDURE — 82310 ASSAY OF CALCIUM: CPT | Performed by: INTERNAL MEDICINE

## 2019-08-01 RX ORDER — ZOLEDRONIC ACID 5 MG/100ML
5 INJECTION, SOLUTION INTRAVENOUS ONCE
Status: COMPLETED | OUTPATIENT
Start: 2019-08-01 | End: 2019-08-01

## 2019-08-01 RX ORDER — ZOLEDRONIC ACID 5 MG/100ML
5 INJECTION, SOLUTION INTRAVENOUS ONCE
Status: CANCELLED
Start: 2019-08-01

## 2019-08-01 RX ORDER — HEPARIN SODIUM,PORCINE 10 UNIT/ML
5 VIAL (ML) INTRAVENOUS
Status: CANCELLED | OUTPATIENT
Start: 2019-08-01

## 2019-08-01 RX ORDER — HEPARIN SODIUM (PORCINE) LOCK FLUSH IV SOLN 100 UNIT/ML 100 UNIT/ML
5 SOLUTION INTRAVENOUS
Status: CANCELLED | OUTPATIENT
Start: 2019-08-01

## 2019-08-01 RX ADMIN — ZOLEDRONIC ACID 5 MG: 0.05 INJECTION, SOLUTION INTRAVENOUS at 10:45

## 2019-08-01 RX ADMIN — SODIUM CHLORIDE 250 ML: 9 INJECTION, SOLUTION INTRAVENOUS at 10:47

## 2019-08-01 NOTE — PROGRESS NOTES
Infusion Nursing Note:  Danika Hyman presents today for Reclast.    Patient seen by provider today: No   present during visit today: Not Applicable.    Note: N/A.    Intravenous Access:  Labs drawn without difficulty.  Peripheral IV placed.    Treatment Conditions:        No results found for: MAG         Lab Results   Component Value Date    CR 0.55 08/01/2019                   Lab Results   Component Value Date    MAXIMO 8.9 08/01/2019           Results reviewed, labs MET treatment parameters, ok to proceed with treatment.      Post Infusion Assessment:  Patient tolerated infusion without incident.  Site patent and intact, free from redness, edema or discomfort.  No evidence of extravasations.  Access discontinued per protocol.       Discharge Plan:   AVS to patient via MYCHART.   Patient discharged in stable condition accompanied by: self.  Departure Mode: Ambulatory.    Ashlyn Douglas RN

## 2019-09-13 ENCOUNTER — MYC MEDICAL ADVICE (OUTPATIENT)
Dept: PEDIATRICS | Facility: CLINIC | Age: 64
End: 2019-09-13

## 2019-09-16 NOTE — TELEPHONE ENCOUNTER
Dr. Floyd:    Patient sent a [employer] message inquiring if you recommend she switch from ranitidine to an alternative medication.     Adina Pitts RN BSN   [facility]

## 2019-10-02 ENCOUNTER — HEALTH MAINTENANCE LETTER (OUTPATIENT)
Age: 64
End: 2019-10-02

## 2019-12-09 DIAGNOSIS — R68.82 DECREASED LIBIDO: ICD-10-CM

## 2019-12-09 DIAGNOSIS — N95.1 SYMPTOMATIC MENOPAUSAL OR FEMALE CLIMACTERIC STATES: ICD-10-CM

## 2019-12-09 NOTE — TELEPHONE ENCOUNTER
Patient requested a refill for both Testosterone 0.2% in vanicream and Progesterone 10% in vanicream and the Estradiol 0.05% in Vanicream . Please send over a new rx for each one when you have a chance.  If you have any questions let me know.      Thanks!  Lucian Godinez  Compounding Pharmacy Technician  [facility]   [address] [place] [address]   Phone: [phone]  Fax: [phone]

## 2019-12-10 RX ORDER — ESTRADIOL CYPIONATE
0.5 POWDER (GRAM) MISCELLANEOUS DAILY
Qty: 45 G | Refills: 3 | Status: SHIPPED | OUTPATIENT
Start: 2019-12-10 | End: 2020-05-12

## 2020-01-18 DIAGNOSIS — N95.1 SYMPTOMATIC MENOPAUSAL OR FEMALE CLIMACTERIC STATES: ICD-10-CM

## 2020-01-20 RX ORDER — ESTRADIOL 0.1 MG/G
CREAM VAGINAL
Qty: 42.5 G | Refills: 0 | Status: SHIPPED | OUTPATIENT
Start: 2020-01-20 | End: 2020-03-13

## 2020-01-20 NOTE — TELEPHONE ENCOUNTER
Medication is being filled for 1 time refill only due to:  Patient needs to be seen because it has been more than one year since last visit.     Magali GUY RN

## 2020-03-04 ENCOUNTER — MYC MEDICAL ADVICE (OUTPATIENT)
Dept: ENDOCRINOLOGY | Facility: CLINIC | Age: 65
End: 2020-03-04

## 2020-03-04 NOTE — TELEPHONE ENCOUNTER
Patient is wondering when to schedule follow-up with endocrinology.      Per last office visit 7/29/19  Plans RECLAST through infusion center in next few weeks.  DEXA in 5/2020 ([facility])  Follow up after DEXA in 2020.  Check cost of both with insurance.    Advised patient to follow-up after her DEXA

## 2020-03-05 NOTE — TELEPHONE ENCOUNTER
"Denied  Refills current    Requested Prescriptions   Pending Prescriptions Disp Refills     ranitidine (ZANTAC) 150 MG tablet [Pharmacy Med Name: RANITIDINE 150MG TABLETS] 180 tablet 3     Sig: TAKE 1 TABLET(150 MG) BY MOUTH TWICE DAILY   Last Written Prescription Date:  180  Last Fill Quantity: 3,  # refills: 9/16/2019   Last office visit: 2/20/2019 with prescribing provider:     Future Office Visit:   Next 5 appointments (look out 90 days)    May 11, 2020  9:00 AM CDT  (Arrive by 8:35 AM)  Adult Preventative Visit with Dexter Floyd MD  [facility] [address] [place] [address]  [address]  [facility] [address]  [phone]             H2 Blockers Protocol Failed - 3/5/2020  5:35 AM        Failed - Recent (12 mo) or future (30 days) visit within the authorizing provider's specialty     Patient has had an office visit with the authorizing provider or a provider within the authorizing providers department within the previous 12 mos or has a future within next 30 days. See \"Patient Info\" tab in inbasket, or \"Choose Columns\" in Meds & Orders section of the refill encounter.              Passed - Patient is age 12 or older        Passed - Medication is active on med list      Tiffanie Nowak RN      "

## 2020-03-06 NOTE — MR AVS SNAPSHOT
After Visit Summary   6/23/2017    Danika Hyman    MRN: [id number]           Patient Information     Date Of Birth          1955        Visit Information        Provider Department      6/23/2017 3:30 PM  INFUSION CHAIR 8 [facility]        Today's Diagnoses     Personal history of drug therapy    -  1    Osteoporosis           Follow-ups after your visit        Who to contact     If you have questions or need follow up information about today's clinic visit or your schedule please contact [facility] AND [facility] directly at [phone].  Normal or non-critical lab and imaging results will be communicated to you by [employer], letter or phone within 4 business days after the clinic has received the results. If you do not hear from us within 7 days, please contact the clinic through [facility] or phone. If you have a critical or abnormal lab result, we will notify you by phone as soon as possible.  Submit refill requests through [facility] or call your pharmacy and they will forward the refill request to us. Please allow 3 business days for your refill to be completed.          Additional Information About Your Visit        MyChart Information     [facility] gives you secure access to your electronic health record. If you see a primary care provider, you can also send messages to your care team and make appointments. If you have questions, please call your primary care clinic.  If you do not have a primary care provider, please call [phone] and they will assist you.        Care EveryWhere ID     This is your Care EveryWhere ID. This could be used by other organizations to access your [facility] medical records  [id number]        Your Vitals Were     Pulse Temperature Respirations Last Period          62 97.5  F (36.4  C) (Oral) 16 04/17/2009         Blood Pressure from Last 3 Encounters:   06/23/17 (!) 88/53   03/22/17 96/60   02/08/17 112/68     Weight from Last 3 Encounters:   03/22/17 58.5 kg (129 lb)   02/08/17 58.9 kg (129 lb 12.8 oz)   06/20/16 58 kg (127 lb 14.4 oz)              Today, you had the following     No orders found for display       Primary Care Provider Office Phone # Fax #    Rupesh Jacobson [phone]       [facility] [address] [facility] [place] [address]        Equal Access to Services     RUTHIE MCFARLANE : Hadii aad ku hadasho Soomaali, waaxda luqadaha, qaybta kaalmada adeegyada, waxay idiin hayaan adeeg kharaheather ladakota woody. So [facility] [phone].    ATENCIÓN: Si habla español, tiene a gannon disposición servicios gratuitos de asistencia lingüística. [facility] [phone].    We comply with applicable federal civil rights laws and [place] laws. We do not discriminate on the basis of race, color, national origin, age, disability sex, sexual orientation or gender identity.            Thank you!     Thank you for choosing [facility] CANCER [facility] AND [facility] CENTER  for your care. Our goal is always to provide you with excellent care. Hearing back from our patients is one way we can continue to improve our services. Please take a few minutes to complete the written survey that you may receive in the mail after your visit with us. Thank you!             Your Updated Medication List - Protect others around you: Learn how to safely use, store and throw away your medicines at www.disposemymeds.org.          This list is accurate as of: 6/23/17  4:38 PM.  Always use your most recent med list.                   Brand Name Dispense Instructions for use Diagnosis    ANUSOL-HC 2.5 % cream   Generic drug:  hydrocortisone     1 Tube    apply B.I.D. or T.I.D.    External hemorrhoids without mention of complication       CALCIUM PLUS TABS   OR      2 tabs q day        cyanocolbalamin 500 MCG tablet    vitamin  B-12    30    1 sublingual qday        ESTRADIOL 0.1MG/GM CREAM     42.5 g    Place 1 g vaginally once a week     Menopausal vaginal dryness, Post-menopausal       estradiol 10 MCG Tabs vaginal tablet    VAGIFEM    12 tablet    Place 1 tablet (10 mcg) vaginally three times a week    Symptomatic menopausal or female climacteric states       Estradiol Cypionate Powd     45 g    0.5 mg daily Apply cream 0.5% to forearm once daily    Symptomatic menopausal or female climacteric states       * OCUVITE EXTRA PO      1 TABLET DAILY        * CENTRUM SILVER PO      1 tablet daily        omega 3 1000 MG Caps      Take 1 g by mouth daily        PROGESTERONE 100MG/G CREAM     45 g    APPLY 1/8TH TEASPOONFUL TO SKIN EVERY DAY    Symptomatic menopausal or female climacteric states       Progesterone Micronized 5 % Crea     30 g    Place 0.25 tsp onto the skin daily.    Symptomatic menopausal or female climacteric states       ranitidine 150 MG tablet    ZANTAC    180 tablet    Take 1 tablet (150 mg) by mouth 2 times daily    Gastroesophageal reflux disease without esophagitis       TESTOSTERONE 2 MG/GM CREAM     120 g    Apply 1/8 teaspoon to skin 2-3 x week    Decreased libido       zolpidem 5 MG tablet    AMBIEN    30 tablet    Take 0.5-1 tablets (2.5-5 mg) by mouth nightly as needed for sleep    Insomnia, unspecified       * Notice:  This list has 2 medication(s) that are the same as other medications prescribed for you. Read the directions carefully, and ask your doctor or other care provider to review them with you.        used

## 2020-03-13 ENCOUNTER — MYC REFILL (OUTPATIENT)
Dept: OBGYN | Facility: CLINIC | Age: 65
End: 2020-03-13

## 2020-03-13 ENCOUNTER — MYC MEDICAL ADVICE (OUTPATIENT)
Dept: PEDIATRICS | Facility: CLINIC | Age: 65
End: 2020-03-13

## 2020-03-13 DIAGNOSIS — N95.1 SYMPTOMATIC MENOPAUSAL OR FEMALE CLIMACTERIC STATES: ICD-10-CM

## 2020-03-13 RX ORDER — ESTRADIOL 0.1 MG/G
CREAM VAGINAL
Qty: 42.5 G | Refills: 0 | Status: SHIPPED | OUTPATIENT
Start: 2020-03-16 | End: 2020-05-08

## 2020-03-13 NOTE — TELEPHONE ENCOUNTER
My chart message sent to the pt advising her that she is overdue for an annual exam. Last annual exam was with Dr. Johnson 1/2019. Pt has already been given a courtesy refill.    Magali GUY RN

## 2020-03-22 ENCOUNTER — HEALTH MAINTENANCE LETTER (OUTPATIENT)
Age: 65
End: 2020-03-22

## 2020-05-05 DIAGNOSIS — N95.1 SYMPTOMATIC MENOPAUSAL OR FEMALE CLIMACTERIC STATES: ICD-10-CM

## 2020-05-05 NOTE — TELEPHONE ENCOUNTER
Medication Question or Refill  Who is calling: patient    What medication are you calling about (include dose and sig)?: estradiol (ESTRACE) 0.1 MG/GM vaginal cream [58981]     Controlled Substance Agreement on file: No    Who prescribed the medication?: Ruth Richter, DO    Do you need a refill? Yes:     When did you use the medication last? as directed    Patient offered an appointment? Yes: - pt has physical with Dr. Floyd 08/24/20    Do you have any questions or concerns?  No    Requested Pharmacy:    [id number] - [place], [place] - [address] AT [address] to leave a detailed message?: Yes at Home number on file [phone] (home)

## 2020-05-06 NOTE — TELEPHONE ENCOUNTER
Estradiol 0.01% Vaginal cream 42.5g      Last Written Prescription Date:  3/16/2020  Last Fill Quantity: 42.5g,   # refills: 0  Last Office Visit: 1/23/2019 with Dr. Johnson  Future Office visit:    Next 5 appointments (look out 90 days)    Percy 15, 2020 11:00 AM CDT  Return Visit with Fe Reyna MD  [facility] [address] [place] [address]  [address]  [facility] [address] [phone]         Per last note, this medication will be filled by her PCP. It appears the medication is already pended by Dr. Floyd

## 2020-05-07 NOTE — TELEPHONE ENCOUNTER
Routing to MA/TC pool. The Pt is due for an OV with PCP. She has an appointment in 3 months but will need one within the next month for refills. Please call and help them schedule. Please assess if the Pt has enough medication to get them through until their upcoming future visit, or if they need a refill.     Please route back to refill pool with response    Soni Banks RN on 5/7/2020 at 9:28 AM

## 2020-05-08 RX ORDER — ESTRADIOL 0.1 MG/G
CREAM VAGINAL
Qty: 42.5 G | Refills: 0 | Status: SHIPPED | OUTPATIENT
Start: 2020-05-11 | End: 2020-05-12

## 2020-05-08 NOTE — TELEPHONE ENCOUNTER
Patient is scheduled for appointment on 5/12/2020. Patient stated that she will need a refill on her prescription estradiol (ESTRACE) 0.1 MG/GM vaginal cream. Patient will run out before her appointment date.     Lara Lara MA

## 2020-05-08 NOTE — TELEPHONE ENCOUNTER
Prescription approved per [facility] Refill Protocol.    Soni Banks RN on 5/8/2020 at 3:42 PM

## 2020-05-12 ENCOUNTER — VIRTUAL VISIT (OUTPATIENT)
Dept: PEDIATRICS | Facility: CLINIC | Age: 65
End: 2020-05-12
Payer: COMMERCIAL

## 2020-05-12 DIAGNOSIS — R68.82 DECREASED LIBIDO: ICD-10-CM

## 2020-05-12 DIAGNOSIS — K21.9 GASTROESOPHAGEAL REFLUX DISEASE WITHOUT ESOPHAGITIS: ICD-10-CM

## 2020-05-12 DIAGNOSIS — N95.1 SYMPTOMATIC MENOPAUSAL OR FEMALE CLIMACTERIC STATES: ICD-10-CM

## 2020-05-12 PROCEDURE — 99213 OFFICE O/P EST LOW 20 MIN: CPT | Mod: TEL | Performed by: INTERNAL MEDICINE

## 2020-05-12 RX ORDER — ESTRADIOL CYPIONATE
0.5 POWDER (GRAM) MISCELLANEOUS DAILY
Qty: 45 G | Refills: 1 | Status: SHIPPED | OUTPATIENT
Start: 2020-05-12 | End: 2020-08-24

## 2020-05-12 RX ORDER — ESTRADIOL 0.1 MG/G
CREAM VAGINAL
Qty: 42.5 G | Refills: 1 | Status: SHIPPED | OUTPATIENT
Start: 2020-05-14 | End: 2020-08-26

## 2020-05-12 RX ORDER — FAMOTIDINE 10 MG
10 TABLET ORAL 2 TIMES DAILY
COMMUNITY
End: 2022-01-05

## 2020-05-12 NOTE — PROGRESS NOTES
"Danika Hyman is a 65 year old female who is being evaluated via a billable telephone visit.      The patient has been notified of following:     \"This telephone visit will be conducted via a call between you and your physician/provider. We have found that certain health care needs can be provided without the need for a physical exam.  This service lets us provide the care you need with a short phone conversation.  If a prescription is necessary we can send it directly to your pharmacy.  If lab work is needed we can place an order for that and you can then stop by our lab to have the test done at a later time.    Telephone visits are billed at different rates depending on your insurance coverage. During this emergency period, for some insurers they may be billed the same as an in-person visit.  Please reach out to your insurance provider with any questions.    If during the course of the call the physician/provider feels a telephone visit is not appropriate, you will not be charged for this service.\"    Patient has given verbal consent for Telephone visit?  Yes    What phone number would you like to be contacted at? [phone]    How would you like to obtain your AVS? Rasheed Andrade     Danika Hyman is a 65 year old female who presents to clinic today for the following health issues:    HPI    Presents today for medication refills.  Patient's current regimen includes topical estradiol-she uses 2 separate estradiol preparations for both postmenopausal vaginal symptoms as well as a second topical estrogen.  In addition she use uses daily topical progesterone and  topical testosterone.  Per chart review patient has used these preparations for at least the past 10 years if not longer.        She states that this combination of topical hormones has helped her with vaginal dryness, skin dryness and irritation, decreased libido associated with menopause.  She states she has she currently takes testosterone which " she was told by endocrinology was beneficial for bone mass.  (However recent endocrinology visit notes do not comment on testosterone as a part of ongoing mgmt for osteoporosis.)  Patient would like refills of all of her topical hormones today.     Patient Active Problem List   Diagnosis     Gastroesophageal reflux disease without esophagitis     Status post bariatric surgery     CARDIOVASCULAR SCREENING; LDL GOAL LESS THAN 160     Advanced directives, counseling/discussion (ACP)     Osteoporosis     Personal history of drug therapy     Past Surgical History:   Procedure Laterality Date     C NONSPECIFIC PROCEDURE      wisdom teeth extraction,abstracted     C NONSPECIFIC PROCEDURE      T&A,abstracted     C NONSPECIFIC PROCEDURE      2 natural childbirths,abstracted     C NONSPECIFIC PROCEDURE      gastric stapling [facility]     CATARACT IOL, RT/LT  2016       Social History     Tobacco Use     Smoking status: Never Smoker     Smokeless tobacco: Never Used   Substance Use Topics     Alcohol use: Yes     Comment: 4 glasses a week     Family History   Problem Relation Age of Onset     Arthritis Father         B:       Diabetes Father      Respiratory Father      Cardiovascular Father      Hypertension Mother      Alcohol/Drug Mother         B:192 alive     Lung Cancer Mother 89        lung cancer ;  age 91     Family History Negative Sister      Family History Negative Sister      Family History Negative Brother          Current Outpatient Medications   Medication Sig Dispense Refill     CALCIUM PLUS TABS   OR 2 tabs q day       CENTRUM SILVER OR 1 tablet daily       [START ON 2020] estradiol (ESTRACE) 0.1 MG/GM vaginal cream Place vaginally twice a week 42.5 g 1     Estradiol Cypionate POWD 0.5 mg daily Apply cream 0.5% to forearm once daily 45 g 1     famotidine (PEPCID) 10 MG tablet Take 10 mg by mouth 2 times daily       PROGESTERONE 100MG/G CREAM APPLY 1/8 TEASPOONFUL TO SKIN EVERY  DAY 45 g 1     TESTOSTERONE 2 MG/GM CREAM Apply 1/8 teaspoon to skin 2-3 x week 120 g 1     VITAMIN B-12 500 MCG OR TABS 1 sublingual qday 30 5     UNABLE TO FIND MEDICATION NAME:   Estradiol 0.05% in Vanicream  Testosterone 0.2% in Vanicream  Progesterone 10% in Vanicream         Reviewed and updated as needed this visit by Provider            Objective   Reported vitals:  LMP 04/17/2009          Assessment/Plan:  1. Symptomatic menopausal or female climacteric states         Patient elects to continue 2 different topical estradiol preparations as well topical progesterone.  Appears to have been on this regimen for more than 10 years.  Did express some reservation regarding the amount of topical estrogen she is using-- we did discuss the risks associated with postmenopausal hormone therapy including breast/endometrial cancer risk, cardiovascular risk and risk of stroke /DVT.  (Arguably the risk associated with topical preparations is somewhat less than oral HRT)     I did recommend a follow-up visit with gynecology to revisit the pros and cons of topical hormone replacement and other potential options.  Patient was referred to [facility]necology at [facility].  - PROGESTERONE 100MG/G CREAM; APPLY 1/8 TEASPOONFUL TO SKIN EVERY DAY  Dispense: 45 g; Refill: 1  - Estradiol Cypionate POWD; 0.5 mg daily Apply cream 0.5% to forearm once daily  Dispense: 45 g; Refill: 1  - estradiol (ESTRACE) 0.1 MG/GM vaginal cream; Place vaginally twice a week  Dispense: 42.5 g; Refill: 1  - OB/GYN REFERRAL    2. Decreased libido          likely repeat check lipid panel at some point in the future --provided this is normal it seems reasonable to continue this preparation as long as she finds it beneficial.  - TESTOSTERONE 2 MG/GM CREAM; Apply 1/8 teaspoon to skin 2-3 x week  Dispense: 120 g; Refill: 1    3. Gastroesophageal reflux disease without esophagitis      Sx well controlled, continue current rx    Phone call duration:  12  minutes    Dexter Floyd MD, MD

## 2020-05-18 ENCOUNTER — MYC MEDICAL ADVICE (OUTPATIENT)
Dept: ENDOCRINOLOGY | Facility: CLINIC | Age: 65
End: 2020-05-18

## 2020-06-03 ENCOUNTER — MYC MEDICAL ADVICE (OUTPATIENT)
Dept: ENDOCRINOLOGY | Facility: CLINIC | Age: 65
End: 2020-06-03

## 2020-06-04 NOTE — TELEPHONE ENCOUNTER
appt changed.    Message sent via [employer].      MAYRA Castellon Endocrinology  [facility]

## 2020-06-05 ENCOUNTER — ANCILLARY PROCEDURE (OUTPATIENT)
Dept: BONE DENSITY | Facility: CLINIC | Age: 65
End: 2020-06-05
Payer: COMMERCIAL

## 2020-06-05 DIAGNOSIS — M81.0 OSTEOPOROSIS, UNSPECIFIED OSTEOPOROSIS TYPE, UNSPECIFIED PATHOLOGICAL FRACTURE PRESENCE: ICD-10-CM

## 2020-06-05 PROCEDURE — 77080 DXA BONE DENSITY AXIAL: CPT | Performed by: FAMILY MEDICINE

## 2020-06-22 ENCOUNTER — VIRTUAL VISIT (OUTPATIENT)
Dept: ENDOCRINOLOGY | Facility: CLINIC | Age: 65
End: 2020-06-22
Payer: COMMERCIAL

## 2020-06-22 DIAGNOSIS — M81.0 OSTEOPOROSIS, UNSPECIFIED OSTEOPOROSIS TYPE, UNSPECIFIED PATHOLOGICAL FRACTURE PRESENCE: Primary | ICD-10-CM

## 2020-06-22 PROCEDURE — 99214 OFFICE O/P EST MOD 30 MIN: CPT | Mod: GT | Performed by: INTERNAL MEDICINE

## 2020-06-22 RX ORDER — HEPARIN SODIUM (PORCINE) LOCK FLUSH IV SOLN 100 UNIT/ML 100 UNIT/ML
5 SOLUTION INTRAVENOUS
Status: CANCELLED | OUTPATIENT
Start: 2020-06-22

## 2020-06-22 RX ORDER — HEPARIN SODIUM,PORCINE 10 UNIT/ML
5 VIAL (ML) INTRAVENOUS
Status: CANCELLED | OUTPATIENT
Start: 2020-06-22

## 2020-06-22 RX ORDER — ZOLEDRONIC ACID 5 MG/100ML
5 INJECTION, SOLUTION INTRAVENOUS ONCE
Status: CANCELLED
Start: 2020-06-22

## 2020-06-22 NOTE — LETTER
"    6/22/2020         RE: Danika Hyman  [address] [place] [address]        Dear Colleague,    Thank you for referring your patient, [facility]yman[facility]. Please see a copy of my visit note below.    THIS IS A VIDEO VISIT:    Phone call visit/virtual visit encounter:    Name of patient: Danika Hyman    Date of encounter: 6/22/2020    Time of start of video visit: 10:55    Video started: 10:59    Video ended: 11:17    Time visit video ended:    Provider location: working from home/ [facility]    Patient location: patients home.    Mode of transmission: video/ Doximity    Verbal consent: obtained before starting visit. Pt is agreeable.      The patient has been notified of following:      \"This VIDEO visit will be conducted via a call between you and your physician/provider. We have found that certain health care needs can be provided without the need for a physical exam.  This service lets us provide the care you need with a short phone conversation.  If a prescription is necessary we can send it directly to your pharmacy.  If lab work is needed we can place an order for that and you can then stop by our lab to have the test done at a later time.     With new updates with corona virus patient might be billed as clinic visit.     If during the course of the call the physician/provider feels a telephone visit is not appropriate, you will not be charged for this service.\"      Past medical history, social history, family history, allergy and medications were reviewed and updated as appropriate.  Reviewed pertinent labs, notes, imaging studies personally.    Name: Danika Hyman  Date: 6/22/2020  Seen for f/u of osteoporosis.   Last visit 7/2019  HPI:  Danika Hyman is a 65 year old female who presents for the evaluation of osteoperosis.  Other past medical history includes history of gastric bypass surgery, esophageal reflux disease.    H/o osteopenia- in 50s.  DEXA 2017- " osteoporosis  Following that she was seen at [facility].  At that time given her history of GERD and gastric bypass surgery, intravenous therapy was considered. Never been on oral anti-reoprtive therapy.    Started Reclast-- 2017.  Received Reclast 2018, 2019  Tolerated well.  DEXA  2018- showing- suggestion of a possible trend towards improvement of the lumbar spine, and no significant change of the total hip.    H/o Gastric bypass at age 26 years.  Lost 80 lbs at that time and was able to keep it off.  H/o bulimia before that.  On HRT- followed by OBGYN. Is on testosterone replacement as well as estradiol.  Has multiple questions about HRT.    Smoke:No  Family History: mother   Menstrual history/Birthing:   Fractures:in last 10 year- fracture metatarsal after a fall  Kidney stones: No  GI Surgery:h/o gastric bypass in past in   Duration of therapy: Reclast as noted above  Exercise: Does 81081 steps/day, does lifts weights.   Diet:   Milk: Minimal   Cheese: Minimal     Ca/Vitamin D: Calcium- 1500 mg/day , vit D 1800 IU/day.  Alcohol:  rare  Eating Disorder: Yes: h/o bulimia as teenager  Steroid Use:  No  PMH/PSH:  Past Medical History:   Diagnosis Date     Esophageal reflux      Osteoporosis 2017    DEXA:  lumbar -2.8, left hip -2.1, right hip -2.5     Status post bariatric surgery     vertical banded gastrosplasty at [facility]     Past Surgical History:   Procedure Laterality Date     C NONSPECIFIC PROCEDURE      wisdom teeth extraction,abstracted     C NONSPECIFIC PROCEDURE      T&A,abstracted     C NONSPECIFIC PROCEDURE      2 natural childbirths,abstracted     C NONSPECIFIC PROCEDURE      gastric stapling ast [facility]     CATARACT IOL, RT/LT  2016     Family Hx:  Family History   Problem Relation Age of Onset     Arthritis Father         B:       Diabetes Father      Respiratory Father      Cardiovascular Father      Hypertension Mother       Alcohol/Drug Mother         B: alive     Lung Cancer Mother 89        lung cancer ;  age 91     Family History Negative Sister      Family History Negative Sister      Family History Negative Brother              Social Hx:  Social History     Socioeconomic History     Marital status:      Spouse name: Not on file     Number of children: Not on file     Years of education: Not on file     Highest education level: Not on file   Occupational History     Occupation:    Social Needs     Financial resource strain: Not on file     Food insecurity     Worry: Not on file     Inability: Not on file     Transportation needs     Medical: Not on file     Non-medical: Not on file   Tobacco Use     Smoking status: Never Smoker     Smokeless tobacco: Never Used   Substance and Sexual Activity     Alcohol use: Yes     Comment: 4 glasses a week     Drug use: No     Sexual activity: Yes     Partners: Male   Lifestyle     Physical activity     Days per week: Not on file     Minutes per session: Not on file     Stress: Not on file   Relationships     Social connections     Talks on phone: Not on file     Gets together: Not on file     Attends Baptism service: Not on file     Active member of club or organization: Not on file     Attends meetings of clubs or organizations: Not on file     Relationship status: Not on file     Intimate partner violence     Fear of current or ex partner: Not on file     Emotionally abused: Not on file     Physically abused: Not on file     Forced sexual activity: Not on file   Other Topics Concern     Parent/sibling w/ CABG, MI or angioplasty before 65F 55M? Not Asked   Social History Narrative     Not on file          MEDICATIONS:  has a current medication list which includes the following prescription(s): calcium & phosphate w/ vit d & iron, multiple vitamins-minerals, estradiol, estradiol cypionate, famotidine, progesterone, testosterone, UNABLE TO FIND, and  cyanocobalamin.    ROS     ROS: 10 point ROS neg other than the symptoms noted above in the HPI.    Physical Exam   VS: LMP 04/17/2009   Breastfeeding No   GENERAL: healthy, alert and no distress  EYES: Eyes grossly normal to inspection, conjunctivae and sclerae normal  RESP: no audible wheeze, cough, or visible cyanosis.  No visible retractions or increased work of breathing.  Able to speak fully in complete sentences.  NEURO: Cranial nerves grossly intact, mentation intact and speech normal  PSYCH: mentation appears normal, affect normal/bright, judgement and insight intact, normal speech and appearance well-groomed      LABS:  TFTs:  TSH   Date Value Ref Range Status   06/20/2016 2.89 0.40 - 4.00 mU/L Final       PTH:   ENDO CALCIUM LABS-[facility] Latest Ref Rng & Units 3/19/2018   PARATHYROID HORMONE INTACT 18 - 80 pg/mL 41       Vitamin D:  Vitamin D Deficiency Screening Results:  Lab Results   Component Value Date    VITDT 41 07/29/2019    VITDT 55 03/19/2018    VITDT 57 06/20/2016    VITDT 52 03/06/2013       DEXA 5/2017:  RISK FACTORS: Post-menopausal, Parent history of osteoporosis, Fracture of bone on top of foot at age 54, fell off step while gardening, Condition related to bone loss: gastric bypass, Follow-up osteopenia     CURRENT TREATMENT: Calcium, Vitamin D, Estrogen, Testosterone      FINDINGS:  Lumbar Spine (L1-L4) T-score: -2.8  Left Total Hip   T-score: -2.1  Right Total Hip   T-score: -2.5      Lumbar (L1-L4) BMD: 0.850 Previous: 0.987   Total Hip Mean BMD: 0.722 Previous: 0.814      Comparison is made to another DXA performed on a different [employer] machine since 2007        IMPRESSION  Osteoporosis  Degenerative changes of the spine  Recommendations include ensuring adequate daily Calcium and Vitamin D intake  Follow up scan can be considered in three years.     Comparisons from different scanners that have not been cross calibrated, are not necessarily valid. Such a comparison has been performed  here; one should interpret with caution.  Compared to previous bone densitometry performed on this patient, there is the suggestion of a possible trend towards worsening of the lumbar spine, and a possible trend towards worsening of the total hip.     Current NOF guidelines recommend treatment for patients with the following:  - Prior hip or vertebral fracture  - T-score -2.5 or below  - A 10 year risk of any major osteoporotic fracture >20% or 10 year risk of hip fracture >3%, as calculated using the FRAX calculator (www.shef.ac.uk/FRAX).       Based on these guidelines, treatment (in addition to calcium and vitamin D) is recommended for this patient, after ruling out other causes of osteoporosis/low bone density.  While this is meant as an aid to clinical decision-making, clinical judgment must still be used.      DEXA 5/2018:  RISK FACTORS:  Post-menopausal, Parent history of osteoporosis, Fracture of foot 2008, Condition related to bone loss: gastric bypass, Follow-up osteoporosis     CURRENT TREATMENT:  Calcium, Vitamin D, Estrogen, Reclast (zoledronic acid), Testosterone     FINDINGS:               Lumbar Spine (L1-L4)      T-score:  -2.4               Left Femoral Neck                      T-score:  -1.7               Right Femoral Neck                    T-score:  -2.1                             Lumbar (L1-L4) BMD: 0.892            Previous: 0.850                                              Total Hip Mean BMD: 0.750            Previous: 0.722     Comparison is made to another DXA performed on the same [employer]  machine on 05/04/2017.          IMPRESSION  Osteopenia (low bone mass)  Degenerative changes of the spine  Recommendations include ensuring adequate daily Calcium and Vitamin D intake  Follow up scan can be considered in 3 years.     Comparisons are not necessarily valid when precision within the machine has not been determined. Such a comparison has been performed here; one should interpret  with caution.   Compared to previous bone densitometry performed on this patient, there is the suggestion of a possible trend towards improvement of the lumbar spine, and no significant change of the total hip.     Current NOF guidelines recommend treatment for patients with the following:  - Prior hip or vertebral fracture  - T-score -2.5 or below  - A 10 year risk of any major osteoporotic fracture >20% or 10 year risk of hip fracture >3%, as calculated using the FRAX calculator (www.shef.ac.uk/FRAX).       FRAX calculation is validated for people who are not on treatment.     DEXA 6/2020:  FINDINGS:               Lumbar Spine (L1-L4)      T-score:  -2.0               Left Femoral Neck            T-score:  -1.5               Right Femoral Neck          T-score:  -1.9                             Lumbar (L1-L4) BMD: 0.945  Previous: 0.892                          Total Hip Mean BMD: 0.755  Previous: 0.750     Comparison is made to another DXA performed on the same [employer]  machine on 05/26/2018 and another in 2017.        IMPRESSION  Osteopenia (low bone mass)  Degenerative changes of the spine  Recommendations include ensuring adequate daily Calcium and Vitamin D intake  Follow up scan can be considered in three years.     Comparisons are not necessarily valid when precision within the machine has not been determined. Such a comparison has been performed here; one should interpret with caution.   Compared to previous bone densitometry performed on this patient, there is the suggestion of a possible trend towards improvement of the lumbar spine, and no significant change of the total hip.    All pertinent notes, labs, and images personally reviewed by me.   All pertinent notes, labs and reports done at outside clinic personally reviewed by me.      A/P  Ms.Nicole JOSETTE Hyman is a 65 year old here for the evaluation of:    #1 Osteoporosis:    Risk factors for low bone density include family history, ,  female, s/p gastric bypass, low BMI, Estrogen deficiency, low Ca intake.  DEXA 2017 consistent with osteoporosis  She received Reclast in June 2017, July 2018, 8/2019 and tolerated well.  DEXA 2019: There is the suggestion of a possible trend towards improvement of the lumbar spine, and no significant change of the total hip.  Plan:  Given history of gastric bypass as well as GERD will recommend to continue with intravenous treatment.  Plan to continue Reclast for next 1-2 years. Consider drug holiday after that.  DEXA in 1-2 years.      #2. H/o gastric bypass:  On vit D replacement.    #3. HRT:  On testosterone and estradiol  Followed by outside provider.  Discussed that it is not a first line t/t for osteoporosis in post menopausal females.    The pt was advised to    Maintain an adequate calcium and vitamin D intake and to supplement vitamin D if needed to maintain serum levels of 25 hydroxy D (25 OH D) between 30-60 ng/ml.    Limit alcohol intake to no more than 2 servings per day.    Limit caffeine intake.    Maintain an active lifestyle including weight-bearing exercises for at least 30 mins daily.    Take measures to reduce the risk of falling.    Instructions on RECLAT. Treatment with bisphosphonate therapy will decrease fracture risk 50-70%.   There is risk of osteonecrosis of the jaw in patients using bisphosphonates is approximately 1/1700-1/100,000, with development most likely related to invasive dental procedures. If an invasive dental procedure was necessary, preferably stop the bisphosphonate approximately 3 months prior to reduce the risk. Let your dentist know that you are on this medication.  The data available at this time suggests that there is probably a small increase risk of atypical (nontraumatic) subtrochanteric fractures of the femur in patients on bisphosphonate therapy compared to those not on it. One large study suggested that for every 100 fractures prevented with bisphosphonate  therapy, less than one femur fracture will occur. Other studies suggest one episode per 2,500 patient years. Patient should call with leg pain.      Discussed indications, risks and benefits of all medications prescribed, and answered questions to patient's satisfaction.      More than 50% of the time spent with Ms. Hyman on counseling / coordinating her care.      Follow-up:  1 year    Fe Reyna MD  Endocrinology  [facility]  CC: Dexter Floyd    Addendum to above note and clinic visit:    Labs reviewed.    See result note/telephone encounter.            Again, thank you for allowing me to participate in the care of your patient.        Sincerely,        Fe Reyna MD

## 2020-06-22 NOTE — NURSING NOTE
RECLAST:    08/01/19 07/20/18 06/23/17    Maya Funez, MAYRA  [facility]  [place]

## 2020-06-22 NOTE — PROGRESS NOTES
"THIS IS A VIDEO VISIT:    Phone call visit/virtual visit encounter:    Name of patient: Danika Hyman    Date of encounter: 6/22/2020    Time of start of video visit: 10:55    Video started: 10:59    Video ended: 11:17    Time visit video ended:    Provider location: working from home/ [facility]    Patient location: patients home.    Mode of transmission: video/ Doximity    Verbal consent: obtained before starting visit. Pt is agreeable.      The patient has been notified of following:      \"This VIDEO visit will be conducted via a call between you and your physician/provider. We have found that certain health care needs can be provided without the need for a physical exam.  This service lets us provide the care you need with a short phone conversation.  If a prescription is necessary we can send it directly to your pharmacy.  If lab work is needed we can place an order for that and you can then stop by our lab to have the test done at a later time.     With new updates with corona virus patient might be billed as clinic visit.     If during the course of the call the physician/provider feels a telephone visit is not appropriate, you will not be charged for this service.\"      Past medical history, social history, family history, allergy and medications were reviewed and updated as appropriate.  Reviewed pertinent labs, notes, imaging studies personally.    Name: Danika Hyman  Date: 6/22/2020  Seen for f/u of osteoporosis.   Last visit 7/2019  HPI:  Danika Hyman is a 65 year old female who presents for the evaluation of osteoperosis.  Other past medical history includes history of gastric bypass surgery, esophageal reflux disease.    H/o osteopenia- in 50s.  DEXA 2017- osteoporosis  Following that she was seen at [facility] [place].  At that time given her history of GERD and gastric bypass surgery, intravenous therapy was considered. Never been on oral anti-reoprtive therapy.    Started " Reclast-- 2017.  Received Reclast 2018, 2019  Tolerated well.  DEXA  2018- showing- suggestion of a possible trend towards improvement of the lumbar spine, and no significant change of the total hip.    H/o Gastric bypass at age 26 years.  Lost 80 lbs at that time and was able to keep it off.  H/o bulimia before that.  On HRT- followed by OBGYN. Is on testosterone replacement as well as estradiol.  Has multiple questions about HRT.    Smoke:No  Family History: mother   Menstrual history/Birthing:   Fractures:in last 10 year- fracture metatarsal after a fall  Kidney stones: No  GI Surgery:h/o gastric bypass in past in   Duration of therapy: Reclast as noted above  Exercise: Does 27063 steps/day, does lifts weights.   Diet:   Milk: Minimal   Cheese: Minimal     Ca/Vitamin D: Calcium- 1500 mg/day , vit D 1800 IU/day.  Alcohol:  rare  Eating Disorder: Yes: h/o bulimia as teenager  Steroid Use:  No  PMH/PSH:  Past Medical History:   Diagnosis Date     Esophageal reflux      Osteoporosis 2017    DEXA:  lumbar -2.8, left hip -2.1, right hip -2.5     Status post bariatric surgery 1982    vertical banded gastrosplasty at [facility]     Past Surgical History:   Procedure Laterality Date     C NONSPECIFIC PROCEDURE      wisdom teeth extraction,abstracted     C NONSPECIFIC PROCEDURE      T&A,abstracted     C NONSPECIFIC PROCEDURE      2 natural childbirths,abstracted     C NONSPECIFIC PROCEDURE      gastric stapling ast [facility]     CATARACT IOL, RT/LT  2016     Family Hx:  Family History   Problem Relation Age of Onset     Arthritis Father         B:       Diabetes Father      Respiratory Father      Cardiovascular Father      Hypertension Mother      Alcohol/Drug Mother         B:192 alive     Lung Cancer Mother 89        lung cancer ;  age 91     Family History Negative Sister      Family History Negative Sister      Family History Negative Brother              Social Hx:  Social  History     Socioeconomic History     Marital status:      Spouse name: Not on file     Number of children: Not on file     Years of education: Not on file     Highest education level: Not on file   Occupational History     Occupation:    Social Needs     Financial resource strain: Not on file     Food insecurity     Worry: Not on file     Inability: Not on file     Transportation needs     Medical: Not on file     Non-medical: Not on file   Tobacco Use     Smoking status: Never Smoker     Smokeless tobacco: Never Used   Substance and Sexual Activity     Alcohol use: Yes     Comment: 4 glasses a week     Drug use: No     Sexual activity: Yes     Partners: Male   Lifestyle     Physical activity     Days per week: Not on file     Minutes per session: Not on file     Stress: Not on file   Relationships     Social connections     Talks on phone: Not on file     Gets together: Not on file     Attends Yarsanism service: Not on file     Active member of club or organization: Not on file     Attends meetings of clubs or organizations: Not on file     Relationship status: Not on file     Intimate partner violence     Fear of current or ex partner: Not on file     Emotionally abused: Not on file     Physically abused: Not on file     Forced sexual activity: Not on file   Other Topics Concern     Parent/sibling w/ CABG, MI or angioplasty before 65F 55M? Not Asked   Social History Narrative     Not on file          MEDICATIONS:  has a current medication list which includes the following prescription(s): calcium & phosphate w/ vit d & iron, multiple vitamins-minerals, estradiol, estradiol cypionate, famotidine, progesterone, testosterone, UNABLE TO FIND, and cyanocobalamin.    ROS     ROS: 10 point ROS neg other than the symptoms noted above in the HPI.    Physical Exam   VS: LMP 04/17/2009   Breastfeeding No   GENERAL: healthy, alert and no distress  EYES: Eyes grossly normal to inspection, conjunctivae and  sclerae normal  RESP: no audible wheeze, cough, or visible cyanosis.  No visible retractions or increased work of breathing.  Able to speak fully in complete sentences.  NEURO: Cranial nerves grossly intact, mentation intact and speech normal  PSYCH: mentation appears normal, affect normal/bright, judgement and insight intact, normal speech and appearance well-groomed      LABS:  TFTs:  TSH   Date Value Ref Range Status   06/20/2016 2.89 0.40 - 4.00 mU/L Final       PTH:   ENDO CALCIUM LABS-[facility] Latest Ref Rng & Units 3/19/2018   PARATHYROID HORMONE INTACT 18 - 80 pg/mL 41       Vitamin D:  Vitamin D Deficiency Screening Results:  Lab Results   Component Value Date    VITDT 41 07/29/2019    VITDT 55 03/19/2018    VITDT 57 06/20/2016    VITDT 52 03/06/2013       DEXA 5/2017:  RISK FACTORS: Post-menopausal, Parent history of osteoporosis, Fracture of bone on top of foot at age 54, fell off step while gardening, Condition related to bone loss: gastric bypass, Follow-up osteopenia     CURRENT TREATMENT: Calcium, Vitamin D, Estrogen, Testosterone      FINDINGS:  Lumbar Spine (L1-L4) T-score: -2.8  Left Total Hip   T-score: -2.1  Right Total Hip   T-score: -2.5      Lumbar (L1-L4) BMD: 0.850 Previous: 0.987   Total Hip Mean BMD: 0.722 Previous: 0.814      Comparison is made to another DXA performed on a different [employer] machine since 2007        IMPRESSION  Osteoporosis  Degenerative changes of the spine  Recommendations include ensuring adequate daily Calcium and Vitamin D intake  Follow up scan can be considered in three years.     Comparisons from different scanners that have not been cross calibrated, are not necessarily valid. Such a comparison has been performed here; one should interpret with caution.  Compared to previous bone densitometry performed on this patient, there is the suggestion of a possible trend towards worsening of the lumbar spine, and a possible trend towards worsening of the total hip.     Current  NOF guidelines recommend treatment for patients with the following:  - Prior hip or vertebral fracture  - T-score -2.5 or below  - A 10 year risk of any major osteoporotic fracture >20% or 10 year risk of hip fracture >3%, as calculated using the FRAX calculator (www.shef.ac.uk/FRAX).       Based on these guidelines, treatment (in addition to calcium and vitamin D) is recommended for this patient, after ruling out other causes of osteoporosis/low bone density.  While this is meant as an aid to clinical decision-making, clinical judgment must still be used.      DEXA 5/2018:  RISK FACTORS:  Post-menopausal, Parent history of osteoporosis, Fracture of foot 2008, Condition related to bone loss: gastric bypass, Follow-up osteoporosis     CURRENT TREATMENT:  Calcium, Vitamin D, Estrogen, Reclast (zoledronic acid), Testosterone     FINDINGS:               Lumbar Spine (L1-L4)      T-score:  -2.4               Left Femoral Neck                      T-score:  -1.7               Right Femoral Neck                    T-score:  -2.1                             Lumbar (L1-L4) BMD: 0.892            Previous: 0.850                                              Total Hip Mean BMD: 0.750            Previous: 0.722     Comparison is made to another DXA performed on the same [employer]  machine on 05/04/2017.          IMPRESSION  Osteopenia (low bone mass)  Degenerative changes of the spine  Recommendations include ensuring adequate daily Calcium and Vitamin D intake  Follow up scan can be considered in 3 years.     Comparisons are not necessarily valid when precision within the machine has not been determined. Such a comparison has been performed here; one should interpret with caution.   Compared to previous bone densitometry performed on this patient, there is the suggestion of a possible trend towards improvement of the lumbar spine, and no significant change of the total hip.     Current NOF guidelines recommend treatment for  patients with the following:  - Prior hip or vertebral fracture  - T-score -2.5 or below  - A 10 year risk of any major osteoporotic fracture >20% or 10 year risk of hip fracture >3%, as calculated using the FRAX calculator (www.shef.ac.uk/FRAX).       FRAX calculation is validated for people who are not on treatment.     DEXA 6/2020:  FINDINGS:               Lumbar Spine (L1-L4)      T-score:  -2.0               Left Femoral Neck            T-score:  -1.5               Right Femoral Neck          T-score:  -1.9                             Lumbar (L1-L4) BMD: 0.945  Previous: 0.892                          Total Hip Mean BMD: 0.755  Previous: 0.750     Comparison is made to another DXA performed on the same [employer]  machine on 05/26/2018 and another in 2017.        IMPRESSION  Osteopenia (low bone mass)  Degenerative changes of the spine  Recommendations include ensuring adequate daily Calcium and Vitamin D intake  Follow up scan can be considered in three years.     Comparisons are not necessarily valid when precision within the machine has not been determined. Such a comparison has been performed here; one should interpret with caution.   Compared to previous bone densitometry performed on this patient, there is the suggestion of a possible trend towards improvement of the lumbar spine, and no significant change of the total hip.    All pertinent notes, labs, and images personally reviewed by me.   All pertinent notes, labs and reports done at outside clinic personally reviewed by me.      A/P  Ms.Nicole JOSETTE Hyman is a 65 year old here for the evaluation of:    #1 Osteoporosis:    Risk factors for low bone density include family history, , female, s/p gastric bypass, low BMI, Estrogen deficiency, low Ca intake.  DEXA 2017 consistent with osteoporosis  She received Reclast in June 2017, July 2018, 8/2019 and tolerated well.  DEXA 2019: There is the suggestion of a possible trend towards improvement of  the lumbar spine, and no significant change of the total hip.  Plan:  Given history of gastric bypass as well as GERD will recommend to continue with intravenous treatment.  Plan to continue Reclast for next 1-2 years. Consider drug holiday after that.  DEXA in 1-2 years.      #2. H/o gastric bypass:  On vit D replacement.    #3. HRT:  On testosterone and estradiol  Followed by outside provider.  Discussed that it is not a first line t/t for osteoporosis in post menopausal females.    The pt was advised to    Maintain an adequate calcium and vitamin D intake and to supplement vitamin D if needed to maintain serum levels of 25 hydroxy D (25 OH D) between 30-60 ng/ml.    Limit alcohol intake to no more than 2 servings per day.    Limit caffeine intake.    Maintain an active lifestyle including weight-bearing exercises for at least 30 mins daily.    Take measures to reduce the risk of falling.    Instructions on RECLAT. Treatment with bisphosphonate therapy will decrease fracture risk 50-70%.   There is risk of osteonecrosis of the jaw in patients using bisphosphonates is approximately 1/1700-1/100,000, with development most likely related to invasive dental procedures. If an invasive dental procedure was necessary, preferably stop the bisphosphonate approximately 3 months prior to reduce the risk. Let your dentist know that you are on this medication.  The data available at this time suggests that there is probably a small increase risk of atypical (nontraumatic) subtrochanteric fractures of the femur in patients on bisphosphonate therapy compared to those not on it. One large study suggested that for every 100 fractures prevented with bisphosphonate therapy, less than one femur fracture will occur. Other studies suggest one episode per 2,500 patient years. Patient should call with leg pain.      Discussed indications, risks and benefits of all medications prescribed, and answered questions to patient's  satisfaction.      More than 50% of the time spent with Ms. Hyman on counseling / coordinating her care.      Follow-up:  1 year    Fe Reyna MD  Endocrinology  [facility]  CC: Dexter Floyd    Addendum to above note and clinic visit:    Labs reviewed.    See result note/telephone encounter.

## 2020-06-22 NOTE — PATIENT INSTRUCTIONS
[facility] & [place] locations   Dr Reyna, Endocrinology Department      [facility]   [address]  [place] [address]  Appointment [phone]  Fax: [phone]  [place]: Monday and Tuesday         [facility]   [address]  [place] [address]  Appointment [phone]  Fax: [phone]  [place]: Wednesday and Thursday            Please check the cost coverage and copay with insurance before recommended tests, services and medications (especially if new medications are prescribed).     If ordered, please get blood work done 1 week prior to your next appointment so they will be available to Dr. Reyna at your visit.      Plan for RECLAST Infusion in 2020.  DEXA in 1-2 years year ( based on insurance).    [facility]                [phone]   Infusion center- [facility].                [phone]     Please call infusion center to schedule the treatment/ test discussed.    The pt was advised to    Maintain an adequate calcium and vitamin D intake and to supplement vitamin D if needed to maintain serum levels of 25 hydroxy D (25 OH D) between 30-60 ng/ml.    Limit alcohol intake to no more than 2 servings per day.    Limit caffeine intake.    Maintain an active lifestyle including weight-bearing exercises for at least 30 mins daily.    Take measures to reduce the risk of falling.    You should get 1000- 1200 mg/day calcium in divided doses of no more than 500 mg/dose.  This INCLUDES what is in your food as well as what is in any supplements you may be taking.    Vit D about 800-1000 IU/day ( unless you have vit D deficiency- in that case higher dose)  Dietary sources of calcium:: These also contain vitamin D  Milk                            8 oz            300 mg calcium  Yogurt                          1 cup           400 mg  calcium   Hard cheese                     1.5 oz          300 mg  Cottage cheese                  2 cup           300 mg  Orange juice with Calcium       8 oz            300 mg  Low fat dairy sources are recommended      You should get 30 minutes of moderate weight bearing exercise on most days of the week .  Weight bearing exercise includes such things as walking, jogging, hiking, dancing.  You should also get Strength training 2 or more times/week in addition to other weight -being exercise. Strength training uses weight or resistance beyond that seen in everyday activities -(pilates, weight training with free weights, weight machines or resistance bands)

## 2020-07-03 ENCOUNTER — ANCILLARY PROCEDURE (OUTPATIENT)
Dept: MAMMOGRAPHY | Facility: CLINIC | Age: 65
End: 2020-07-03
Payer: COMMERCIAL

## 2020-07-03 DIAGNOSIS — Z12.31 VISIT FOR SCREENING MAMMOGRAM: ICD-10-CM

## 2020-07-03 PROCEDURE — 77067 SCR MAMMO BI INCL CAD: CPT | Mod: TC

## 2020-07-03 PROCEDURE — 77063 BREAST TOMOSYNTHESIS BI: CPT | Mod: TC

## 2020-08-06 ENCOUNTER — TRANSFERRED RECORDS (OUTPATIENT)
Dept: HEALTH INFORMATION MANAGEMENT | Facility: CLINIC | Age: 65
End: 2020-08-06

## 2020-08-06 LAB
CHOLEST SERPL-MCNC: 157 MG/DL (ref 125–199)
GLUCOSE SERPL-MCNC: 54 MG/DL (ref 65–99)
HDLC SERPL-MCNC: 88 MG/DL
LDLC SERPL CALC-MCNC: 33 MG/DL
TRIGL SERPL-MCNC: 59 MG/DL

## 2020-08-07 ENCOUNTER — INFUSION THERAPY VISIT (OUTPATIENT)
Dept: INFUSION THERAPY | Facility: CLINIC | Age: 65
End: 2020-08-07
Attending: INTERNAL MEDICINE
Payer: COMMERCIAL

## 2020-08-07 ENCOUNTER — HOSPITAL ENCOUNTER (OUTPATIENT)
Facility: CLINIC | Age: 65
Setting detail: SPECIMEN
Discharge: HOME | End: 2020-08-07
Attending: INTERNAL MEDICINE | Admitting: INTERNAL MEDICINE
Payer: COMMERCIAL

## 2020-08-07 VITALS
TEMPERATURE: 97.8 F | SYSTOLIC BLOOD PRESSURE: 107 MMHG | RESPIRATION RATE: 16 BRPM | DIASTOLIC BLOOD PRESSURE: 69 MMHG | OXYGEN SATURATION: 96 %

## 2020-08-07 DIAGNOSIS — Z92.29 PERSONAL HISTORY OF DRUG THERAPY: Primary | ICD-10-CM

## 2020-08-07 DIAGNOSIS — M81.0 OSTEOPOROSIS, UNSPECIFIED OSTEOPOROSIS TYPE, UNSPECIFIED PATHOLOGICAL FRACTURE PRESENCE: ICD-10-CM

## 2020-08-07 DIAGNOSIS — Z98.84 STATUS POST BARIATRIC SURGERY: ICD-10-CM

## 2020-08-07 DIAGNOSIS — K21.9 GASTROESOPHAGEAL REFLUX DISEASE WITHOUT ESOPHAGITIS: ICD-10-CM

## 2020-08-07 LAB
CALCIUM SERPL-MCNC: 8.8 MG/DL (ref 8.5–10.1)
CREAT SERPL-MCNC: 0.68 MG/DL (ref 0.52–1.04)
GFR SERPL CREATININE-BSD FRML MDRD: >90 ML/MIN/{1.73_M2}

## 2020-08-07 PROCEDURE — 25000128 H RX IP 250 OP 636: Performed by: INTERNAL MEDICINE

## 2020-08-07 PROCEDURE — 36415 COLL VENOUS BLD VENIPUNCTURE: CPT

## 2020-08-07 PROCEDURE — 82310 ASSAY OF CALCIUM: CPT | Performed by: INTERNAL MEDICINE

## 2020-08-07 PROCEDURE — 82565 ASSAY OF CREATININE: CPT | Performed by: INTERNAL MEDICINE

## 2020-08-07 PROCEDURE — 96374 THER/PROPH/DIAG INJ IV PUSH: CPT

## 2020-08-07 RX ORDER — HEPARIN SODIUM (PORCINE) LOCK FLUSH IV SOLN 100 UNIT/ML 100 UNIT/ML
5 SOLUTION INTRAVENOUS
Status: CANCELLED | OUTPATIENT
Start: 2020-08-07

## 2020-08-07 RX ORDER — ZOLEDRONIC ACID 5 MG/100ML
5 INJECTION, SOLUTION INTRAVENOUS ONCE
Status: COMPLETED | OUTPATIENT
Start: 2020-08-07 | End: 2020-08-07

## 2020-08-07 RX ORDER — HEPARIN SODIUM,PORCINE 10 UNIT/ML
5 VIAL (ML) INTRAVENOUS
Status: CANCELLED | OUTPATIENT
Start: 2020-08-07

## 2020-08-07 RX ORDER — ZOLEDRONIC ACID 5 MG/100ML
5 INJECTION, SOLUTION INTRAVENOUS ONCE
Status: CANCELLED
Start: 2020-08-07

## 2020-08-07 RX ADMIN — ZOLEDRONIC ACID 5 MG: 0.05 INJECTION, SOLUTION INTRAVENOUS at 14:50

## 2020-08-07 NOTE — PROGRESS NOTES
Infusion Nursing Note:  Danika Hyman presents today for labs and reclast.    Patient seen by provider today: No   present during visit today: Not Applicable.    Note: Confirmed patient is taking calcium and vitamin D.  Denies recent or upcoming dental procedures.    Intravenous Access:  Labs drawn without difficulty.  Peripheral IV placed.    Treatment Conditions:  Lab Results   Component Value Date     06/20/2016                   Lab Results   Component Value Date    POTASSIUM 4.1 06/20/2016           No results found for: MAG         Lab Results   Component Value Date    CR 0.68 08/07/2020                   Lab Results   Component Value Date    MAXIMO 8.8 08/07/2020                Lab Results   Component Value Date    BILITOTAL 0.3 06/20/2016           Lab Results   Component Value Date    ALBUMIN 3.5 06/20/2016                    Lab Results   Component Value Date    ALT 35 06/20/2016           Lab Results   Component Value Date    AST 30 06/20/2016       Results reviewed, labs MET treatment parameters, ok to proceed with treatment.      Post Infusion Assessment:  Patient tolerated infusion without incident.  Blood return noted pre and post infusion.  Site patent and intact, free from redness, edema or discomfort.  No evidence of extravasations.  Access discontinued per protocol.       Discharge Plan:   Patient discharged in stable condition accompanied by: self.  Departure Mode: Ambulatory.    Ashlyn Martin RN

## 2020-08-21 ENCOUNTER — MYC MEDICAL ADVICE (OUTPATIENT)
Dept: PEDIATRICS | Facility: CLINIC | Age: 65
End: 2020-08-21

## 2020-08-21 NOTE — TELEPHONE ENCOUNTER
Attachment printed.   Label.   Placed for abstract.         Vannessa Turpin MA// August 21, 2020 10:59 AM

## 2020-08-24 ENCOUNTER — OFFICE VISIT (OUTPATIENT)
Dept: OBGYN | Facility: CLINIC | Age: 65
End: 2020-08-24
Payer: COMMERCIAL

## 2020-08-24 ENCOUNTER — MYC MEDICAL ADVICE (OUTPATIENT)
Dept: PEDIATRICS | Facility: CLINIC | Age: 65
End: 2020-08-24

## 2020-08-24 VITALS — SYSTOLIC BLOOD PRESSURE: 110 MMHG | DIASTOLIC BLOOD PRESSURE: 60 MMHG | BODY MASS INDEX: 22.78 KG/M2 | WEIGHT: 132.7 LBS

## 2020-08-24 DIAGNOSIS — N95.1 SYMPTOMATIC MENOPAUSAL OR FEMALE CLIMACTERIC STATES: ICD-10-CM

## 2020-08-24 DIAGNOSIS — N95.2 VAGINAL ATROPHY: Primary | ICD-10-CM

## 2020-08-24 DIAGNOSIS — Z13.6 CARDIOVASCULAR SCREENING; LDL GOAL LESS THAN 160: Primary | ICD-10-CM

## 2020-08-24 DIAGNOSIS — R68.82 DECREASED LIBIDO: ICD-10-CM

## 2020-08-24 PROCEDURE — 99214 OFFICE O/P EST MOD 30 MIN: CPT | Performed by: OBSTETRICS & GYNECOLOGY

## 2020-08-24 RX ORDER — ESTRADIOL CYPIONATE
0.5 POWDER (GRAM) MISCELLANEOUS DAILY
Qty: 45 G | Refills: 1 | Status: SHIPPED | OUTPATIENT
Start: 2020-08-24 | End: 2021-04-11

## 2020-08-24 NOTE — NURSING NOTE
"Chief Complaint   Patient presents with     Consult     for hormone replacement, medication. Patient is on Estradiol topical cream, progesterone and testosterone. PCP just wanted patient to see OB/GYN to make sure medication therapy is appropriate.        Initial /60   Wt 60.2 kg (132 lb 11.2 oz)   LMP 2009   BMI 22.78 kg/m   Estimated body mass index is 22.78 kg/m  as calculated from the following:    Height as of 19: 1.626 m (5' 4\").    Weight as of this encounter: 60.2 kg (132 lb 11.2 oz).  BP completed using cuff size: regular    Questioned patient about current smoking habits.  Pt. has never smoked.          The following HM Due: NONE    Mercy Lee CMA               "

## 2020-08-24 NOTE — PROGRESS NOTES
SUBJECTIVE:   CC: Evaluation of HRT                                               Danika Hyman is a 65 year old  female who presents to clinic today for evaluation of HRT. She is using topical estrogen, progesterone, and testosterone as well as vaginal estrogen.   Started E/P therapy at age 52 w/onset of menopause of vasomotor symptoms with good results. Denies any vaginal bleeding since menopause. Occasionally has night sweats, but not often.    Started testosterone about 5 years ago for libido. Thinks it helps somewhat. Engages in intercourse once weekly and is ok with that. Would like to continue testosterone. Uses a water based lubricant. Sex is much more comfortable with vaginal cream, though she would prefer to use it 3x weekly rather than 2.       Problem list and histories reviewed & adjusted, as indicated.  Additional history: as documented.    ROS:  Const: no weight changes, fever, chills  Psych: mood stable, no anxiety, depression  Skin: no rashes, skin changes     Patient Active Problem List   Diagnosis     Gastroesophageal reflux disease without esophagitis     Status post bariatric surgery     CARDIOVASCULAR SCREENING; LDL GOAL LESS THAN 160     Advanced directives, counseling/discussion (ACP)     Osteoporosis     Personal history of drug therapy     Past Surgical History:   Procedure Laterality Date     C NONSPECIFIC PROCEDURE      wisdom teeth extraction,abstracted     C NONSPECIFIC PROCEDURE      T&A,abstracted     C NONSPECIFIC PROCEDURE      2 natural childbirths,abstracted     C NONSPECIFIC PROCEDURE      gastric stapling ast [facility]     CATARACT IOL, RT/LT  2016      Social History     Tobacco Use     Smoking status: Never Smoker     Smokeless tobacco: Never Used   Substance Use Topics     Alcohol use: Yes     Comment: 4 glasses a week      Problem (# of Occurrences) Relation (Name,Age of Onset)    Alcohol/Drug (1) Mother: B:1921 alive    Arthritis (1) Father: B:1925       Cardiovascular (1) Father    Diabetes (1) Father    Family History Negative (3) Sister, Sister, Brother    Hypertension (1) Mother    Lung Cancer (1) Mother (89): lung cancer ;  age 91    Respiratory (1) Father            CALCIUM PLUS TABS   OR, 2 tabs q day  CENTRUM SILVER OR, 1 tablet daily  estradiol (ESTRACE) 0.1 MG/GM vaginal cream, Place vaginally twice a week  Estradiol Cypionate POWD, 0.5 mg daily Apply cream 0.5% to forearm once daily  famotidine (PEPCID) 10 MG tablet, Take 10 mg by mouth 2 times daily  PROGESTERONE 100MG/G CREAM, APPLY 1/8 TEASPOONFUL TO SKIN EVERY DAY  TESTOSTERONE 2 MG/GM CREAM, Apply 1/8 teaspoon to skin 2-3 x week  VITAMIN B-12 500 MCG OR TABS, 1 sublingual qday  Zoledronic Acid (RECLAST IV),     No current facility-administered medications on file prior to visit.     Allergies   Allergen Reactions     Codeine      Vomiting       OBJECTIVE:   /60   Wt 60.2 kg (132 lb 11.2 oz)   LMP 2009   BMI 22.78 kg/m     Const: sitting in chair in no acute distress, comfortable  Eyes: no scleral icterus, EOMI  CV: regular rate, well perfused  Pulm: no increased work of breathing, no cough  Skin: warm and dry, no rashes/lesions  Psych: mood stable, appropriate affect  Abd: soft, no hepatosplenomegaly, non-tender to palpation  Neuro: A+Ox3   : External genitalia normal well-estrogenized, healthy tissue.  No obvious excoriations, lesions, or rashes. Bartholins, urethra, normal.   SSE: Normal cervix, normal physiologic discharge, pale vaginal mucosa    ASSESSMENT/PLAN:                                                    Danika Hyman is a 65 year old female  here for evaluation of HRT, low libido, and vaginal atrophy.     1. Symptomatic menopausal or female climacteric states  - good control of symptoms. Given persistent occasional night sweats, I do recommend continuing therapy at this time.   - Estradiol Cypionate POWD; 0.5 mg daily Apply cream 0.5% to forearm once daily   Dispense: 45 g; Refill: 1  - PROGESTERONE 100MG/G CREAM; APPLY 1/8 TEASPOONFUL TO SKIN EVERY DAY  Dispense: 45 g; Refill: 1    2. Decreased libido  - recommended discontinuing use if she is not seeing a significant improvement in libido. While her libido is not great, she worries about it worsening and desires to continue. We reviewed that testosterone can affect lipids, her were recently testing and normal. Ok to continue therapy, readdress in 1-2 years.  - discussed foreplay, erotic material, [facility], and silicone based lubricant for improved libido  - TESTOSTERONE 2 MG/GM CREAM; Apply 1/8 teaspoon to skin 2-3 x week  Dispense: 120 g; Refill: 1    3. Vaginal atrophy  - will try estrogen ring for more continuous dosing. Reviewed ok to remove for intercourse and replace if desired.   - estradiol (ESTRING) 2 MG vaginal ring; Place 1 each vaginally every 3 months  Dispense: 1 each; Refill: 3     Total time spent was 25 minutes; greater than 50% of time was spent in counseling and/or coordination of care for the above listed diagnoses.       Tessa Crowe MD  Obstetrics and Gynecology   [facility]

## 2020-08-24 NOTE — PATIENT INSTRUCTIONS
Vaginal Atrophy    Vaginal atrophy is a normal part of the aging process and is a reaction to decreased circulating estrogen, but this doesn't mean that there is nothing we can do about it. Symptoms often include vaginal dryness, itching, pain with intercourse, and narrowing of the vagina.     For dryness you can start by trying Replens, or another over the counter vaginal moisturizer.    Try a silicone or oil based lubricant for intercourse to make it more comfortable. If you want to talk with an expert on lubricants try visiting the [employer] on [facility] in [facility] where they have a large variety of lubricants to try.   You can purchase lubricants at www.SPARQ or amazon.com if they are hard to find elsewhere.    Read about sexual issues after menopause at [email or web address].Coresonic    Vaginal estrogen is a medication inserted into the vagina which can decrease dryness, itching, narrowing, and pain with intercourse. It is administered in very low doses and is locally active. The risks associated with vaginal estrogen are far fewer and less significant than those associated with systemic hormone replacement therapy.  You may need to contact your insurance company to determine which vaginal estrogen formulation is covered at the best rate. This is different for each insurance company and for each type of supplemental coverage. Options include the following:    - Vagifem tablet, inserted vaginally daily for 14 days then 2-3x weekly  - Estring, a ring placed inside the vagina that rests at the top of the vagina for 3 months  - Estrace cream, a cream that is applied daily for 14 days then 2x/week inside the vagina. Apply 0.5g.  - Premarin cream, a cream that is applied daily for 14 days then 2x/week inside the vagina. Apply 0.5g      [facility] can be a great resource for finding ways to cope with changing sexual desire and satisfaction.   - Address: [address],  [place] [address]     - Phone: [phone]

## 2020-08-25 ENCOUNTER — TELEPHONE (OUTPATIENT)
Dept: PEDIATRICS | Facility: CLINIC | Age: 65
End: 2020-08-25

## 2020-08-25 ENCOUNTER — MYC MEDICAL ADVICE (OUTPATIENT)
Dept: OBGYN | Facility: CLINIC | Age: 65
End: 2020-08-25

## 2020-08-25 DIAGNOSIS — R68.82 DECREASED LIBIDO: ICD-10-CM

## 2020-08-25 DIAGNOSIS — N95.1 SYMPTOMATIC MENOPAUSAL OR FEMALE CLIMACTERIC STATES: ICD-10-CM

## 2020-08-25 NOTE — TELEPHONE ENCOUNTER
Prior Authorization Retail Medication Request    Medication/Dose: estradiol cypionate powder  ICD code (if different than what is on RX):    Previously Tried and Failed:    Rationale:      Insurance Name:  [employer]  Insurance ID:  [id number]      Pharmacy Information (if different than what is on RX)  Name:  [facility]  Phone:  [phone]

## 2020-08-25 NOTE — TELEPHONE ENCOUNTER
Pt is requesting her topical rx's be sent to the  compounding pharmacy.   Please approve, as I a unable to approve the testosterone.     Thanks   Magali

## 2020-08-25 NOTE — TELEPHONE ENCOUNTER
Central Prior Authorization Team   Phone: [phone]      PA Initiation    Medication: estradiol cypionate powder-Initiated  Insurance Company: [employer] - Phone [phone] Fax [phone]  Pharmacy Filling the Rx: [id number] - [place] - [address]  Filling Pharmacy Phone: [phone]  Filling Pharmacy Fax:    Start Date: 8/25/2020

## 2020-08-26 RX ORDER — ESTRADIOL 0.1 MG/G
CREAM VAGINAL
Qty: 42.5 G | Refills: 1 | Status: SHIPPED | OUTPATIENT
Start: 2020-08-27 | End: 2020-09-10

## 2020-08-27 NOTE — TELEPHONE ENCOUNTER
PRIOR AUTHORIZATION DENIED    Medication: estradiol cypionate powder-DENIED    Denial Date: 8/27/2020    Denial Rational:         Appeal Information:

## 2020-08-27 NOTE — TELEPHONE ENCOUNTER
Called and spoke with Puma Singer to check on the status of PA, it is still under review and should have a determination by end of business day today.

## 2020-09-01 DIAGNOSIS — Z13.6 CARDIOVASCULAR SCREENING; LDL GOAL LESS THAN 160: ICD-10-CM

## 2020-09-01 PROCEDURE — 80061 LIPID PANEL: CPT | Performed by: INTERNAL MEDICINE

## 2020-09-01 PROCEDURE — 80053 COMPREHEN METABOLIC PANEL: CPT | Performed by: INTERNAL MEDICINE

## 2020-09-01 PROCEDURE — 36415 COLL VENOUS BLD VENIPUNCTURE: CPT | Performed by: INTERNAL MEDICINE

## 2020-09-02 LAB
ALBUMIN SERPL-MCNC: 3.7 G/DL (ref 3.4–5)
ALP SERPL-CCNC: 49 U/L (ref 40–150)
ALT SERPL W P-5'-P-CCNC: 33 U/L (ref 0–50)
ANION GAP SERPL CALCULATED.3IONS-SCNC: 5 MMOL/L (ref 3–14)
AST SERPL W P-5'-P-CCNC: 31 U/L (ref 0–45)
BILIRUB SERPL-MCNC: 0.4 MG/DL (ref 0.2–1.3)
BUN SERPL-MCNC: 14 MG/DL (ref 7–30)
CALCIUM SERPL-MCNC: 9.2 MG/DL (ref 8.5–10.1)
CHLORIDE SERPL-SCNC: 102 MMOL/L (ref 94–109)
CHOLEST SERPL-MCNC: 155 MG/DL
CO2 SERPL-SCNC: 30 MMOL/L (ref 20–32)
CREAT SERPL-MCNC: 0.68 MG/DL (ref 0.52–1.04)
GFR SERPL CREATININE-BSD FRML MDRD: >90 ML/MIN/{1.73_M2}
GLUCOSE SERPL-MCNC: 85 MG/DL (ref 70–99)
HDLC SERPL-MCNC: 103 MG/DL
LDLC SERPL CALC-MCNC: 43 MG/DL
NONHDLC SERPL-MCNC: 52 MG/DL
POTASSIUM SERPL-SCNC: 3.8 MMOL/L (ref 3.4–5.3)
PROT SERPL-MCNC: 7 G/DL (ref 6.8–8.8)
SODIUM SERPL-SCNC: 137 MMOL/L (ref 133–144)
TRIGL SERPL-MCNC: 47 MG/DL

## 2020-09-03 ASSESSMENT — ENCOUNTER SYMPTOMS
ABDOMINAL PAIN: 0
SHORTNESS OF BREATH: 0
CHILLS: 0
DYSURIA: 0
WEAKNESS: 0
PARESTHESIAS: 0
SORE THROAT: 0
HEARTBURN: 0
DIZZINESS: 0
DIARRHEA: 0
CONSTIPATION: 0
COUGH: 0
FREQUENCY: 0
ARTHRALGIAS: 0
PALPITATIONS: 0
HEADACHES: 0
MYALGIAS: 0
NAUSEA: 0
EYE PAIN: 0
NERVOUS/ANXIOUS: 0
HEMATOCHEZIA: 0
FEVER: 0
BREAST MASS: 0
HEMATURIA: 0
JOINT SWELLING: 0

## 2020-09-10 ENCOUNTER — OFFICE VISIT (OUTPATIENT)
Dept: PEDIATRICS | Facility: CLINIC | Age: 65
End: 2020-09-10
Payer: COMMERCIAL

## 2020-09-10 VITALS
OXYGEN SATURATION: 98 % | SYSTOLIC BLOOD PRESSURE: 112 MMHG | TEMPERATURE: 97.8 F | DIASTOLIC BLOOD PRESSURE: 60 MMHG | HEART RATE: 61 BPM | HEIGHT: 64 IN | WEIGHT: 133 LBS | RESPIRATION RATE: 16 BRPM | BODY MASS INDEX: 22.71 KG/M2

## 2020-09-10 DIAGNOSIS — N95.2 VAGINAL ATROPHY: ICD-10-CM

## 2020-09-10 DIAGNOSIS — R68.82 DECREASED LIBIDO: ICD-10-CM

## 2020-09-10 DIAGNOSIS — M81.0 OSTEOPOROSIS, UNSPECIFIED OSTEOPOROSIS TYPE, UNSPECIFIED PATHOLOGICAL FRACTURE PRESENCE: ICD-10-CM

## 2020-09-10 DIAGNOSIS — Z00.00 ROUTINE GENERAL MEDICAL EXAMINATION AT A HEALTH CARE FACILITY: Primary | ICD-10-CM

## 2020-09-10 DIAGNOSIS — K21.9 GASTROESOPHAGEAL REFLUX DISEASE WITHOUT ESOPHAGITIS: ICD-10-CM

## 2020-09-10 DIAGNOSIS — N95.1 SYMPTOMATIC MENOPAUSAL OR FEMALE CLIMACTERIC STATES: ICD-10-CM

## 2020-09-10 PROCEDURE — 99396 PREV VISIT EST AGE 40-64: CPT | Mod: 25 | Performed by: INTERNAL MEDICINE

## 2020-09-10 PROCEDURE — 90471 IMMUNIZATION ADMIN: CPT | Performed by: INTERNAL MEDICINE

## 2020-09-10 PROCEDURE — 90682 RIV4 VACC RECOMBINANT DNA IM: CPT | Performed by: INTERNAL MEDICINE

## 2020-09-10 ASSESSMENT — ENCOUNTER SYMPTOMS
MYALGIAS: 0
ABDOMINAL PAIN: 0
DIZZINESS: 0
SORE THROAT: 0
HEARTBURN: 0
EYE PAIN: 0
BREAST MASS: 0
NAUSEA: 0
CONSTIPATION: 0
HEMATOCHEZIA: 0
HEMATURIA: 0
FREQUENCY: 0
PALPITATIONS: 0
NERVOUS/ANXIOUS: 0
DIARRHEA: 0
JOINT SWELLING: 0
HEADACHES: 0
DYSURIA: 0
WEAKNESS: 0
PARESTHESIAS: 0
COUGH: 0
SHORTNESS OF BREATH: 0
FEVER: 0
ARTHRALGIAS: 0
CHILLS: 0

## 2020-09-10 ASSESSMENT — MIFFLIN-ST. JEOR: SCORE: 1143.28

## 2020-09-10 NOTE — PROGRESS NOTES
SUBJECTIVE:   CC: Danika Hyman is an 65 year old woman who presents for preventive health visit.     Healthy Habits:     Getting at least 3 servings of Calcium per day:  Yes    Bi-annual eye exam:  Yes    Dental care twice a year:  Yes    Sleep apnea or symptoms of sleep apnea:  None    Diet:  Other    Frequency of exercise:  6-7 days/week    Duration of exercise:  30-45 minutes    Taking medications regularly:  Yes    Medication side effects:  None    PHQ-2 Total Score: 0    Additional concerns today:  No        Today's PHQ-2 Score:   PHQ-2 ( 1999 [employer]) 9/3/2020   Q1: Little interest or pleasure in doing things 0   Q2: Feeling down, depressed or hopeless 0   PHQ-2 Score 0   Q1: Little interest or pleasure in doing things Not at all   Q2: Feeling down, depressed or hopeless Not at all   PHQ-2 Score 0       Abuse: Current or Past (Physical, Sexual or Emotional) - No  Do you feel safe in your environment? Yes    Have you ever done Advance Care Planning? (For example, a Health Directive, POLST, or a discussion with a medical provider or your loved ones about your wishes): Yes, advance care planning is on file.    Social History     Tobacco Use     Smoking status: Never Smoker     Smokeless tobacco: Never Used   Substance Use Topics     Alcohol use: Yes     Comment: 4 glasses a week     If you drink alcohol do you typically have >3 drinks per day or >7 drinks per week? No    Alcohol Use 9/10/2020   Prescreen: >3 drinks/day or >7 drinks/week? -   Prescreen: >3 drinks/day or >7 drinks/week? No       Reviewed orders with patient.  Reviewed health maintenance and updated orders accordingly - Yes    PAP / HPV Latest Ref Rng & Units 1/23/2019 5/9/2014 11/11/2010   PAP - NIL NIL NIL   HPV 16 DNA NEG:Negative Negative - -   HPV 18 DNA NEG:Negative Negative - -   OTHER HR HPV NEG:Negative Negative - -     Reviewed and updated as needed this visit by clinical staff  Tobacco  Allergies  Meds  Med Hx  Surg Hx  Fam Hx   Soc Hx        Reviewed and updated as needed this visit by Provider        Patient Active Problem List   Diagnosis     Gastroesophageal reflux disease without esophagitis     Status post bariatric surgery     CARDIOVASCULAR SCREENING; LDL GOAL LESS THAN 160     Advanced directives, counseling/discussion (ACP)     Osteoporosis     Personal history of drug therapy     Symptomatic menopausal or female climacteric states     Decreased libido     Vaginal atrophy     Past Medical History:   Diagnosis Date     Esophageal reflux      Osteoporosis 2017    DEXA:  lumbar -2.8, left hip -2.1, right hip -2.5     Status post bariatric surgery     vertical banded gastrosplasty at [facility]       Past Surgical History:   Procedure Laterality Date     C NONSPECIFIC PROCEDURE      wisdom teeth extraction,abstracted     C NONSPECIFIC PROCEDURE      T&A,abstracted     C NONSPECIFIC PROCEDURE      2 natural childbirths,abstracted     C NONSPECIFIC PROCEDURE      gastric stapling ast [facility]     CATARACT IOL, RT/LT  2016       Family History   Problem Relation Age of Onset     Arthritis Father         B:192       Diabetes Father      Respiratory Father      Cardiovascular Father      Hypertension Mother      Alcohol/Drug Mother         B: alive     Lung Cancer Mother 89        lung cancer ;  age 91     Family History Negative Sister      Family History Negative Sister      Family History Negative Brother        ALLERGIES     Allergies   Allergen Reactions     Codeine      Vomiting         Review of Systems   Constitutional: Negative for chills and fever.   HENT: Negative for congestion, ear pain, hearing loss and sore throat.    Eyes: Negative for pain and visual disturbance.   Respiratory: Negative for cough and shortness of breath.    Cardiovascular: Negative for chest pain, palpitations and peripheral edema.   Gastrointestinal: Negative for abdominal pain, constipation, diarrhea, heartburn, hematochezia  "and nausea.   Breasts:  Negative for tenderness, breast mass and discharge.   Genitourinary: Negative for dysuria, frequency, genital sores, hematuria, pelvic pain, urgency, vaginal bleeding and vaginal discharge.   Musculoskeletal: Negative for arthralgias, joint swelling and myalgias.   Skin: Negative for rash.   Neurological: Negative for dizziness, weakness, headaches and paresthesias.   Psychiatric/Behavioral: Negative for mood changes. The patient is not nervous/anxious.      Current Outpatient Medications   Medication Sig Dispense Refill     CALCIUM PLUS TABS   OR 2 tabs q day       CENTRUM SILVER OR 1 tablet daily       estradiol (ESTRING) 2 MG vaginal ring Place 1 each vaginally every 3 months 1 each 3     Estradiol Cypionate POWD 0.5 mg daily Apply cream 0.5% to forearm once daily 45 g 1     famotidine (PEPCID) 10 MG tablet Take 10 mg by mouth 2 times daily       PROGESTERONE 100MG/G CREAM APPLY 1/8 TEASPOONFUL TO SKIN EVERY DAY 45 g 1     TESTOSTERONE 2 MG/GM CREAM Apply 1/8 teaspoon to skin 2-3 x week 120 g 1     VITAMIN B-12 500 MCG OR TABS 1 sublingual qday 30 5     Zoledronic Acid (RECLAST IV)            OBJECTIVE:   /60 (Cuff Size: Adult Regular)   Pulse 61   Temp 97.8  F (36.6  C) (Tympanic)   Resp 16   Ht 1.626 m (5' 4\")   Wt 60.3 kg (133 lb)   LMP 04/17/2009   SpO2 98%   BMI 22.83 kg/m    Physical Exam  GENERAL APPEARANCE: healthy, alert and no distress  EYES: Eyes grossly normal to inspection, PERRL and conjunctivae and sclerae normal  HENT: ear canals and TM's normal, nose and mouth without ulcers or lesions, oropharynx clear and oral mucous membranes moist  NECK: no adenopathy, no asymmetry, masses, or scars and thyroid normal to palpation  RESP: lungs clear to auscultation - no rales, rhonchi or wheezes  CV: regular rate and rhythm, normal S1 S2, no S3 or S4, no murmur, click or rub, no peripheral edema and peripheral pulses strong  ABDOMEN: soft, nontender, no " "hepatosplenomegaly, no masses and bowel sounds normal  MS: no musculoskeletal defects are noted and gait is age appropriate without ataxia  SKIN: no suspicious lesions or rashes  NEURO: Normal strength and tone, sensory exam grossly normal, mentation intact and speech normal  PSYCH: mentation appears normal and affect normal/bright    ASSESSMENT/PLAN:       ICD-10-CM    1. Routine general medical examination at a health care facility  Z00.00 Colonoscopy 2017, mammogram 7/20       2. Osteoporosis, unspecified osteoporosis type, unspecified pathological fracture presence  M81.0 DEXA 2019, improvement lumbar spine.   Following up with endocrine planning to continue reclast next 1-2 yrs     3. Symptomatic menopausal or female climacteric states  N95.1 Sx well controlled  Continue topical estradiol, progesterone     4. Decreased libido  R68.82 Improved.  Recent Gyn visit pt elected to continue topical testosterone     5. Gastroesophageal reflux disease without esophagitis  K21.9 Sx well controlled, continue famotidine       6. Vaginal atrophy  N95.2 Starting estrogen vaginal ring       COUNSELING:  Reviewed preventive health counseling, as reflected in patient instructions       Regular exercise       Healthy diet/nutrition       Osteoporosis Prevention/Bone Health       Colon cancer screening       (Anjana)menopause management    Estimated body mass index is 22.83 kg/m  as calculated from the following:    Height as of this encounter: 1.626 m (5' 4\").    Weight as of this encounter: 60.3 kg (133 lb).        She reports that she has never smoked. She has never used smokeless tobacco.      Counseling Resources:  ATP IV Guidelines  Pooled Cohorts Equation Calculator  Breast Cancer Risk Calculator  BRCA-Related Cancer Risk Assessment: FHS-7 Tool  FRAX Risk Assessment  ICSI Preventive Guidelines  Dietary Guidelines for Americans, 2010  USDA's MyPlate  ASA Prophylaxis  Lung CA Screening    Dexter Floyd MD, MD  [facility] " [place]

## 2020-10-08 ENCOUNTER — DOCUMENTATION ONLY (OUTPATIENT)
Dept: OTHER | Facility: CLINIC | Age: 65
End: 2020-10-08

## 2020-11-16 ENCOUNTER — MYC MEDICAL ADVICE (OUTPATIENT)
Dept: OBGYN | Facility: CLINIC | Age: 65
End: 2020-11-16

## 2020-11-16 DIAGNOSIS — N95.2 VAGINAL ATROPHY: Primary | ICD-10-CM

## 2020-11-17 NOTE — TELEPHONE ENCOUNTER
Please address the my chart message.  Message was accidentally routed to the wrong provider.  JUNG Tang RN

## 2020-11-18 RX ORDER — ESTRADIOL 0.1 MG/G
1 CREAM VAGINAL
Qty: 42.5 G | Refills: 2 | Status: SHIPPED | OUTPATIENT
Start: 2020-11-19 | End: 2021-08-17

## 2021-03-05 DIAGNOSIS — R68.82 DECREASED LIBIDO: ICD-10-CM

## 2021-03-05 NOTE — TELEPHONE ENCOUNTER
Routing refill request to provider for review/approval because:  Drug not on the [facility] refill protocol     Sandra Camacho RN   [facility] -- Triage Nurse

## 2021-04-10 ENCOUNTER — MYC MEDICAL ADVICE (OUTPATIENT)
Dept: PEDIATRICS | Facility: CLINIC | Age: 66
End: 2021-04-10

## 2021-04-10 DIAGNOSIS — N95.1 SYMPTOMATIC MENOPAUSAL OR FEMALE CLIMACTERIC STATES: ICD-10-CM

## 2021-04-11 RX ORDER — ESTRADIOL CYPIONATE
0.5 POWDER (GRAM) MISCELLANEOUS DAILY
Qty: 45 G | Refills: 1 | Status: SHIPPED | OUTPATIENT
Start: 2021-04-11 | End: 2022-01-05

## 2021-05-07 DIAGNOSIS — N95.1 SYMPTOMATIC MENOPAUSAL OR FEMALE CLIMACTERIC STATES: ICD-10-CM

## 2021-06-10 ENCOUNTER — MYC MEDICAL ADVICE (OUTPATIENT)
Dept: ENDOCRINOLOGY | Facility: CLINIC | Age: 66
End: 2021-06-10

## 2021-06-10 NOTE — TELEPHONE ENCOUNTER
Does she need labs prior to your visit?    Maya Funez, [facility] [place]  [phone]

## 2021-06-10 NOTE — TELEPHONE ENCOUNTER
Endo staff- can you please take a look into this?  Please help schedule virtual visit in August.  I can see patient on August 3 at 2:00 PM.  Thank you.

## 2021-06-10 NOTE — TELEPHONE ENCOUNTER
Please see patient's [employer] message below.    States she needs a reclast infusion in August 2021.  If appropriate, please place order.    Last virtual visit 6-22-20    Please advise, thanks.

## 2021-06-14 NOTE — TELEPHONE ENCOUNTER
Pt called unable to do virtual aug 3 due to work schedule. Writer scheduled pt Aug 4 12:30 by virtual

## 2021-07-05 DIAGNOSIS — Z12.31 VISIT FOR SCREENING MAMMOGRAM: ICD-10-CM

## 2021-07-05 PROCEDURE — 77067 SCR MAMMO BI INCL CAD: CPT | Mod: TC | Performed by: RADIOLOGY

## 2021-07-05 PROCEDURE — 77063 BREAST TOMOSYNTHESIS BI: CPT | Mod: TC | Performed by: RADIOLOGY

## 2021-07-26 ENCOUNTER — MYC MEDICAL ADVICE (OUTPATIENT)
Dept: ENDOCRINOLOGY | Facility: CLINIC | Age: 66
End: 2021-07-26

## 2021-08-04 ENCOUNTER — VIRTUAL VISIT (OUTPATIENT)
Dept: ENDOCRINOLOGY | Facility: CLINIC | Age: 66
End: 2021-08-04
Payer: COMMERCIAL

## 2021-08-04 DIAGNOSIS — M81.0 OSTEOPOROSIS, UNSPECIFIED OSTEOPOROSIS TYPE, UNSPECIFIED PATHOLOGICAL FRACTURE PRESENCE: Primary | ICD-10-CM

## 2021-08-04 PROCEDURE — 99213 OFFICE O/P EST LOW 20 MIN: CPT | Mod: 95 | Performed by: INTERNAL MEDICINE

## 2021-08-04 NOTE — PATIENT INSTRUCTIONS
-[facility]  Dr Reyna, Endocrinology Department    [facility]   [address] [facility]  [place] [address]  Appointment [phone]  Fax: [phone]  [place]: Monday - Thursday      Plan to continue Reclast  Next Reclast 2021  Following that next Reclast 2022  DEXA in 2022 onwards-preferably at [facility].  Follow-up after above in clinic  (Please call clinic in 2022 to get orders for Reclast and DEXA scan placed in chart)    The pt was advised to    Maintain an adequate calcium and vitamin D intake and to supplement vitamin D if needed to maintain serum levels of 25 hydroxy D (25 OH D) between 30-60 ng/ml.    Limit alcohol intake to no more than 2 servings per day.    Limit caffeine intake.    Maintain an active lifestyle including weight-bearing exercises for at least 30 mins daily.    Take measures to reduce the risk of falling.    You should get 1000- 1200 mg/day calcium in divided doses of no more than 500 mg/dose.  This INCLUDES what is in your food as well as what is in any supplements you may be taking.    Vit D about 800-1000 IU/day ( unless you have vit D deficiency- in that case higher dose)  Dietary sources of calcium:: These also contain vitamin D  Milk                            8 oz            300 mg calcium  Yogurt                          1 cup           400 mg calcium   Hard cheese                     1.5 oz          300 mg  Cottage cheese                  2 cup           300 mg  Orange juice with Calcium       8 oz            300 mg  Low fat dairy sources are recommended      You should get 30 minutes of moderate weight bearing exercise on most days of the week .  Weight bearing exercise includes such things as walking, jogging, hiking, dancing.  You should also get Strength training 2 or more times/week in addition to other weight -being exercise. Strength training uses weight or resistance beyond that seen  in everyday activities -(pilates, weight training with free weights, weight machines or resistance bands)

## 2021-08-04 NOTE — LETTER
"    8/4/2021         RE: Danika Hyman  [address] [place] [address]        Dear Colleague,    Thank you for referring your patient, Danika Hyman, to the [facility]. Please see a copy of my visit note below.    THIS IS A VIDEO VISIT:    Phone call visit/virtual visit encounter:    Name of patient: Danika Hyman    Date of encounter: 8/4/2021    Time of start of video visit: 12:38    Video started: 12:49    Video ended: 12:57    Provider location: working from home/ [facility]    Patient location: patients home.    Mode of transmission: video/ Doximity    Verbal consent: obtained before starting visit. Pt is agreeable.      The patient has been notified of following:      \"This VIDEO visit will be conducted via a call between you and your physician/provider. We have found that certain health care needs can be provided without the need for a physical exam.  This service lets us provide the care you need with a short phone conversation.  If a prescription is necessary we can send it directly to your pharmacy.  If lab work is needed we can place an order for that and you can then stop by our lab to have the test done at a later time.     With new updates with corona virus patient might be billed as clinic visit.     If during the course of the call the physician/provider feels a telephone visit is not appropriate, you will not be charged for this service.\"      Past medical history, social history, family history, allergy and medications were reviewed and updated as appropriate.  Reviewed pertinent labs, notes, imaging studies personally.    Name: Danika Hyman  Date:   Seen for f/u of osteoporosis.    HPI:  Danika Hyman is a 66 year old female who presents for the evaluation of osteoperosis.  Other past medical history includes history of gastric bypass surgery, esophageal reflux disease.    H/o osteopenia- in 50s.  DEXA 2017- osteoporosis  Following that she was seen at " [facility] [place].  At that time given her history of GERD and gastric bypass surgery, intravenous therapy was considered. Never been on oral anti-reoprtive therapy.    Started Reclast-- June 2017.  Received Reclast July 2018, 8/2019, 8/2020    Reclast dates:  08/07/20 08/01/19 07/20/18 06/23/17    Tolerated well.  No major complaints.  Has started to go to gym and has started weight training.  No fractures or falls.    DEXA  2018- showing- suggestion of a possible trend towards improvement of the lumbar spine, and no significant change of the total hip.  DEXA 2020: There is the suggestion of a possible trend towards improvement of the lumbar spine, and no significant change of the total hip.      H/o Gastric bypass at age 26 years.  Lost 80 lbs at that time and was able to keep it off.  H/o bulimia before that.  On HRT- followed by OBGYN. Is on testosterone replacement as well as estradiol.  Has multiple questions about HRT.    Smoke:No  Family History: mother   Menstrual history/Birthing:   Fractures:in last 10 year- fracture metatarsal after a fall.  Kidney stones: No  GI Surgery:h/o gastric bypass in past in 1982  Duration of therapy: Reclast as noted above  Exercise: Does 66680 steps/day, does lifts weights.   Diet:   Milk: Minimal   Cheese: Minimal   Ca/Vitamin D: Calcium- 1500 mg/day , vit D 1800 IU/day.  Alcohol:  rare  Eating Disorder: Yes: h/o bulimia as teenager  Steroid Use:  No  PMH/PSH:  Past Medical History:   Diagnosis Date     Esophageal reflux      Osteoporosis 05/04/2017    DEXA:  lumbar -2.8, left hip -2.1, right hip -2.5     Status post bariatric surgery 1982    vertical banded gastrosplasty at [facility]     Past Surgical History:   Procedure Laterality Date     CATARACT IOL, RT/LT  09/2016     ZZ NONSPECIFIC PROCEDURE      wisdom teeth extraction,abstracted     Z NONSPECIFIC PROCEDURE      T&A,abstracted     ZZC NONSPECIFIC PROCEDURE      2 natural childbirths,abstracted      ZZC NONSPECIFIC PROCEDURE  1982    gastric stapling ast [facility]     Family Hx:  Family History   Problem Relation Age of Onset     Arthritis Father         B:       Diabetes Father      Respiratory Father      Cardiovascular Father      Hypertension Mother      Alcohol/Drug Mother         B:192 alive     Lung Cancer Mother 89        lung cancer ;  age 91     Family History Negative Sister      Family History Negative Sister      Family History Negative Brother              Social Hx:  Social History     Socioeconomic History     Marital status:      Spouse name: Not on file     Number of children: Not on file     Years of education: Not on file     Highest education level: Not on file   Occupational History     Occupation:    Tobacco Use     Smoking status: Never Smoker     Smokeless tobacco: Never Used   Substance and Sexual Activity     Alcohol use: Yes     Comment: 4 glasses a week     Drug use: No     Sexual activity: Yes     Partners: Male   Other Topics Concern     Parent/sibling w/ CABG, MI or angioplasty before 65F 55M? Not Asked   Social History Narrative     Not on file     Social Determinants of Health     Financial Resource Strain:      Difficulty of Paying Living Expenses:    Food Insecurity:      Worried About Running Out of Food in the Last Year:      Ran Out of Food in the Last Year:    Transportation Needs:      Lack of Transportation (Medical):      Lack of Transportation (Non-Medical):    Physical Activity:      Days of Exercise per Week:      Minutes of Exercise per Session:    Stress:      Feeling of Stress :    Social Connections:      Frequency of Communication with Friends and Family:      Frequency of Social Gatherings with Friends and Family:      Attends [employer] Services:      Active Member of Clubs or Organizations:      Attends Club or Organization Meetings:      Marital Status:    Intimate Partner Violence:      Fear of Current or Ex-Partner:       Emotionally Abused:      Physically Abused:      Sexually Abused:           MEDICATIONS:  has a current medication list which includes the following prescription(s): calcium & phosphate w/ vit d & iron, multiple vitamins-minerals, estradiol, estradiol cypionate, famotidine, progesterone, testosterone, cyanocobalamin, and zoledronic acid.    ROS     ROS: 10 point ROS neg other than the symptoms noted above in the HPI.    Physical Exam   VS: LMP 04/17/2009   Breastfeeding No   GENERAL: healthy, alert and no distress  EYES: Eyes grossly normal to inspection, conjunctivae and sclerae normal  ENT: no nose swelling, nasal discharge.  Thyroid: no apparent thyroid nodules  RESP: no audible wheeze, cough, or visible cyanosis.  No visible retractions or increased work of breathing.  Able to speak fully in complete sentences.  ABDO: not evaluated.  EXTREMITIES: no hand tremors.  NEURO: Cranial nerves grossly intact, mentation intact and speech normal  SKIN: No apparent skin lesions, rash or edema seen   PSYCH: mentation appears normal, affect normal/bright, judgement and insight intact, normal speech and appearance well-groomed    LABS:  TFTs:  TSH   Date Value Ref Range Status   06/20/2016 2.89 0.40 - 4.00 mU/L Final       PTH:   ENDO CALCIUM LABS-[facility] Latest Ref Rng & Units 3/19/2018   PARATHYROID HORMONE INTACT 18 - 80 pg/mL 41       Vitamin D:  Vitamin D Deficiency Screening Results:  Lab Results   Component Value Date    VITDT 41 07/29/2019    VITDT 55 03/19/2018    VITDT 57 06/20/2016    VITDT 52 03/06/2013       DEXA 5/2017:  RISK FACTORS: Post-menopausal, Parent history of osteoporosis, Fracture of bone on top of foot at age 54, fell off step while gardening, Condition related to bone loss: gastric bypass, Follow-up osteopenia     CURRENT TREATMENT: Calcium, Vitamin D, Estrogen, Testosterone      FINDINGS:  Lumbar Spine (L1-L4) T-score: -2.8  Left Total Hip   T-score: -2.1  Right Total Hip   T-score: -2.5      Lumbar  (L1-L4) BMD: 0.850 Previous: 0.987   Total Hip Mean BMD: 0.722 Previous: 0.814      Comparison is made to another DXA performed on a different [employer] machine since 2007        IMPRESSION  Osteoporosis  Degenerative changes of the spine  Recommendations include ensuring adequate daily Calcium and Vitamin D intake  Follow up scan can be considered in three years.     Comparisons from different scanners that have not been cross calibrated, are not necessarily valid. Such a comparison has been performed here; one should interpret with caution.  Compared to previous bone densitometry performed on this patient, there is the suggestion of a possible trend towards worsening of the lumbar spine, and a possible trend towards worsening of the total hip.     Current NOF guidelines recommend treatment for patients with the following:  - Prior hip or vertebral fracture  - T-score -2.5 or below  - A 10 year risk of any major osteoporotic fracture >20% or 10 year risk of hip fracture >3%, as calculated using the FRAX calculator (www.shef.ac.uk/FRAX).       Based on these guidelines, treatment (in addition to calcium and vitamin D) is recommended for this patient, after ruling out other causes of osteoporosis/low bone density.  While this is meant as an aid to clinical decision-making, clinical judgment must still be used.      DEXA 5/2018:  RISK FACTORS:  Post-menopausal, Parent history of osteoporosis, Fracture of foot 2008, Condition related to bone loss: gastric bypass, Follow-up osteoporosis     CURRENT TREATMENT:  Calcium, Vitamin D, Estrogen, Reclast (zoledronic acid), Testosterone     FINDINGS:               Lumbar Spine (L1-L4)      T-score:  -2.4               Left Femoral Neck                      T-score:  -1.7               Right Femoral Neck                    T-score:  -2.1                             Lumbar (L1-L4) BMD: 0.892            Previous: 0.850                                              Total Hip Mean BMD:  0.750            Previous: 0.722     Comparison is made to another DXA performed on the same [employer]  machine on 05/04/2017.          IMPRESSION  Osteopenia (low bone mass)  Degenerative changes of the spine  Recommendations include ensuring adequate daily Calcium and Vitamin D intake  Follow up scan can be considered in 3 years.     Comparisons are not necessarily valid when precision within the machine has not been determined. Such a comparison has been performed here; one should interpret with caution.   Compared to previous bone densitometry performed on this patient, there is the suggestion of a possible trend towards improvement of the lumbar spine, and no significant change of the total hip.     Current NOF guidelines recommend treatment for patients with the following:  - Prior hip or vertebral fracture  - T-score -2.5 or below  - A 10 year risk of any major osteoporotic fracture >20% or 10 year risk of hip fracture >3%, as calculated using the FRAX calculator (www.shef.ac.uk/FRAX).       FRAX calculation is validated for people who are not on treatment.     DEXA 6/2020:  FINDINGS:               Lumbar Spine (L1-L4)      T-score:  -2.0               Left Femoral Neck            T-score:  -1.5               Right Femoral Neck          T-score:  -1.9                             Lumbar (L1-L4) BMD: 0.945  Previous: 0.892                          Total Hip Mean BMD: 0.755  Previous: 0.750     Comparison is made to another DXA performed on the same [employer]  machine on 05/26/2018 and another in 2017.        IMPRESSION  Osteopenia (low bone mass)  Degenerative changes of the spine  Recommendations include ensuring adequate daily Calcium and Vitamin D intake  Follow up scan can be considered in three years.     Comparisons are not necessarily valid when precision within the machine has not been determined. Such a comparison has been performed here; one should interpret with caution.   Compared to  previous bone densitometry performed on this patient, there is the suggestion of a possible trend towards improvement of the lumbar spine, and no significant change of the total hip.    All pertinent notes, labs, and images personally reviewed by me.   All pertinent notes, labs and reports done at outside clinic personally reviewed by me.      A/P  Ms.Nicole JOSETTE Hyman is a 65 year old here for the evaluation of:    #1 Osteoporosis:    Risk factors for low bone density include family history, , female, s/p gastric bypass, low BMI, Estrogen deficiency, low Ca intake.  DEXA 2017 consistent with osteoporosis  She received Reclast in June 2017, July 2018, August 2019, August 2020 and tolerated well.  DEXA 2020: There is the suggestion of a possible trend towards improvement of the lumbar spine, and no significant change of the total hip.  Plan:  Given history of gastric bypass as well as GERD will recommend to continue with intravenous treatment.  Plan to continue Reclast for next 1-2 years. Consider drug holiday after that.  Plan to continue Reclast  Next Reclast August 2021- she has upcoming appointment scheduled.  Following that next Reclast August 2022  DEXA in August 2022 onwards-pref[facility] at [facility].  Follow-up after above in clinic  (Please call clinic in June 2022 to get orders for Reclast and DEXA scan placed in chart)      #2. H/o gastric bypass:  On vit D replacement.    #3. HRT:  On testosterone and estradiol  Followed by outside provider.  Discussed that it is not a first line t/t for osteoporosis in post menopausal females.    The pt was advised to    Maintain an adequate calcium and vitamin D intake and to supplement vitamin D if needed to maintain serum levels of 25 hydroxy D (25 OH D) between 30-60 ng/ml.    Limit alcohol intake to no more than 2 servings per day.    Limit caffeine intake.    Maintain an active lifestyle including weight-bearing exercises for at least 30 mins daily.    Take  measures to reduce the risk of falling.    Instructions on RECLAT. Treatment with bisphosphonate therapy will decrease fracture risk 50-70%.   There is risk of osteonecrosis of the jaw in patients using bisphosphonates is approximately 1/1700-1/100,000, with development most likely related to invasive dental procedures. If an invasive dental procedure was necessary, preferably stop the bisphosphonate approximately 3 months prior to reduce the risk. Let your dentist know that you are on this medication.  The data available at this time suggests that there is probably a small increase risk of atypical (nontraumatic) subtrochanteric fractures of the femur in patients on bisphosphonate therapy compared to those not on it. One large study suggested that for every 100 fractures prevented with bisphosphonate therapy, less than one femur fracture will occur. Other studies suggest one episode per 2,500 patient years. Patient should call with leg pain.      Discussed indications, risks and benefits of all medications prescribed, and answered questions to patient's satisfaction.      More than 50% of the time spent with Ms. Hyman on counseling / coordinating her care.      Follow-up:  1 year    Fe Reyna MD  Endocrinology  [facility]  CC: Dexter Floyd    Addendum to above note and clinic visit:    Labs reviewed.    See result note/telephone encounter.              Again, thank you for allowing me to participate in the care of your patient.        Sincerely,        Fe Reyna MD

## 2021-08-04 NOTE — PROGRESS NOTES
"THIS IS A VIDEO VISIT:    Phone call visit/virtual visit encounter:    Name of patient: Danika Hyman    Date of encounter: 8/4/2021    Time of start of video visit: 12:38    Video started: 12:49    Video ended: 12:57    Provider location: working from [place]/ [facility]    Patient location: patients home.    Mode of transmission: video/ Doximity    Verbal consent: obtained before starting visit. Pt is agreeable.      The patient has been notified of following:      \"This VIDEO visit will be conducted via a call between you and your physician/provider. We have found that certain health care needs can be provided without the need for a physical exam.  This service lets us provide the care you need with a short phone conversation.  If a prescription is necessary we can send it directly to your pharmacy.  If lab work is needed we can place an order for that and you can then stop by our lab to have the test done at a later time.     With new updates with corona virus patient might be billed as clinic visit.     If during the course of the call the physician/provider feels a telephone visit is not appropriate, you will not be charged for this service.\"      Past medical history, social history, family history, allergy and medications were reviewed and updated as appropriate.  Reviewed pertinent labs, notes, imaging studies personally.    Name: Danika Hyman  Date:   Seen for f/u of osteoporosis.    HPI:  Danika Hyman is a 66 year old female who presents for the evaluation of osteoperosis.  Other past medical history includes history of gastric bypass surgery, esophageal reflux disease.    H/o osteopenia- in 50s.  DEXA 2017- osteoporosis  Following that she was seen at [facility] [place].  At that time given her history of GERD and gastric bypass surgery, intravenous therapy was considered. Never been on oral anti-reoprtive therapy.    Started Reclast-- June 2017.  Received Reclast July 2018, 8/2019, " 2020    Reclast dates:  20    Tolerated well.  No major complaints.  Has started to go to gym and has started weight training.  No fractures or falls.    DEXA  - showing- suggestion of a possible trend towards improvement of the lumbar spine, and no significant change of the total hip.  DEXA : There is the suggestion of a possible trend towards improvement of the lumbar spine, and no significant change of the total hip.      H/o Gastric bypass at age 26 years.  Lost 80 lbs at that time and was able to keep it off.  H/o bulimia before that.  On HRT- followed by OBGYN. Is on testosterone replacement as well as estradiol.  Has multiple questions about HRT.    Smoke:No  Family History: mother   Menstrual history/Birthing:   Fractures:in last 10 year- fracture metatarsal after a fall.  Kidney stones: No  GI Surgery:h/o gastric bypass in past in   Duration of therapy: Reclast as noted above  Exercise: Does 24161 steps/day, does lifts weights.   Diet:   Milk: Minimal   Cheese: Minimal   Ca/Vitamin D: Calcium- 1500 mg/day , vit D 1800 IU/day.  Alcohol:  rare  Eating Disorder: Yes: h/o bulimia as teenager  Steroid Use:  No  PMH/PSH:  Past Medical History:   Diagnosis Date     Esophageal reflux      Osteoporosis 2017    DEXA:  lumbar -2.8, left hip -2.1, right hip -2.5     Status post bariatric surgery     vertical banded gastrosplasty at [facility]     Past Surgical History:   Procedure Laterality Date     CATARACT IOL, RT/LT  2016     Z NONSPECIFIC PROCEDURE      wisdom teeth extraction,abstracted     Z NONSPECIFIC PROCEDURE      T&A,abstracted     Z NONSPECIFIC PROCEDURE      2 natural childbirths,abstracted     Z NONSPECIFIC PROCEDURE  1982    gastric stapling ast [facility]     Family Hx:  Family History   Problem Relation Age of Onset     Arthritis Father         B:1925       Diabetes Father      Respiratory Father      Cardiovascular Father       Hypertension Mother      Alcohol/Drug Mother         B:192 alive     Lung Cancer Mother 89        lung cancer ;  age 91     Family History Negative Sister      Family History Negative Sister      Family History Negative Brother              Social Hx:  Social History     Socioeconomic History     Marital status:      Spouse name: Not on file     Number of children: Not on file     Years of education: Not on file     Highest education level: Not on file   Occupational History     Occupation:    Tobacco Use     Smoking status: Never Smoker     Smokeless tobacco: Never Used   Substance and Sexual Activity     Alcohol use: Yes     Comment: 4 glasses a week     Drug use: No     Sexual activity: Yes     Partners: Male   Other Topics Concern     Parent/sibling w/ CABG, MI or angioplasty before 65F 55M? Not Asked   Social History Narrative     Not on file     Social Determinants of Health     Financial Resource Strain:      Difficulty of Paying Living Expenses:    Food Insecurity:      Worried About Running Out of Food in the Last Year:      Ran Out of Food in the Last Year:    Transportation Needs:      Lack of Transportation (Medical):      Lack of Transportation (Non-Medical):    Physical Activity:      Days of Exercise per Week:      Minutes of Exercise per Session:    Stress:      Feeling of Stress :    Social Connections:      Frequency of Communication with Friends and Family:      Frequency of Social Gatherings with Friends and Family:      Attends Sikh Services:      Active Member of Clubs or Organizations:      Attends Club or Organization Meetings:      Marital Status:    Intimate Partner Violence:      Fear of Current or Ex-Partner:      Emotionally Abused:      Physically Abused:      Sexually Abused:           MEDICATIONS:  has a current medication list which includes the following prescription(s): calcium & phosphate w/ vit d & iron, multiple vitamins-minerals, estradiol, estradiol  cypionate, famotidine, progesterone, testosterone, cyanocobalamin, and zoledronic acid.    ROS     ROS: 10 point ROS neg other than the symptoms noted above in the HPI.    Physical Exam   VS: LMP 04/17/2009   Breastfeeding No   GENERAL: healthy, alert and no distress  EYES: Eyes grossly normal to inspection, conjunctivae and sclerae normal  ENT: no nose swelling, nasal discharge.  Thyroid: no apparent thyroid nodules  RESP: no audible wheeze, cough, or visible cyanosis.  No visible retractions or increased work of breathing.  Able to speak fully in complete sentences.  ABDO: not evaluated.  EXTREMITIES: no hand tremors.  NEURO: Cranial nerves grossly intact, mentation intact and speech normal  SKIN: No apparent skin lesions, rash or edema seen   PSYCH: mentation appears normal, affect normal/bright, judgement and insight intact, normal speech and appearance well-groomed    LABS:  TFTs:  TSH   Date Value Ref Range Status   06/20/2016 2.89 0.40 - 4.00 mU/L Final       PTH:   ENDO CALCIUM LABS-[facility] Latest Ref Rng & Units 3/19/2018   PARATHYROID HORMONE INTACT 18 - 80 pg/mL 41       Vitamin D:  Vitamin D Deficiency Screening Results:  Lab Results   Component Value Date    VITDT 41 07/29/2019    VITDT 55 03/19/2018    VITDT 57 06/20/2016    VITDT 52 03/06/2013       DEXA 5/2017:  RISK FACTORS: Post-menopausal, Parent history of osteoporosis, Fracture of bone on top of foot at age 54, fell off step while gardening, Condition related to bone loss: gastric bypass, Follow-up osteopenia     CURRENT TREATMENT: Calcium, Vitamin D, Estrogen, Testosterone      FINDINGS:  Lumbar Spine (L1-L4) T-score: -2.8  Left Total Hip   T-score: -2.1  Right Total Hip   T-score: -2.5      Lumbar (L1-L4) BMD: 0.850 Previous: 0.987   Total Hip Mean BMD: 0.722 Previous: 0.814      Comparison is made to another DXA performed on a different Qio machine since 2007        IMPRESSION  Osteoporosis  Degenerative changes of the spine  Recommendations  include ensuring adequate daily Calcium and Vitamin D intake  Follow up scan can be considered in three years.     Comparisons from different scanners that have not been cross calibrated, are not necessarily valid. Such a comparison has been performed here; one should interpret with caution.  Compared to previous bone densitometry performed on this patient, there is the suggestion of a possible trend towards worsening of the lumbar spine, and a possible trend towards worsening of the total hip.     Current NOF guidelines recommend treatment for patients with the following:  - Prior hip or vertebral fracture  - T-score -2.5 or below  - A 10 year risk of any major osteoporotic fracture >20% or 10 year risk of hip fracture >3%, as calculated using the FRAX calculator (www.shef.ac.uk/FRAX).       Based on these guidelines, treatment (in addition to calcium and vitamin D) is recommended for this patient, after ruling out other causes of osteoporosis/low bone density.  While this is meant as an aid to clinical decision-making, clinical judgment must still be used.      DEXA 5/2018:  RISK FACTORS:  Post-menopausal, Parent history of osteoporosis, Fracture of foot 2008, Condition related to bone loss: gastric bypass, Follow-up osteoporosis     CURRENT TREATMENT:  Calcium, Vitamin D, Estrogen, Reclast (zoledronic acid), Testosterone     FINDINGS:               Lumbar Spine (L1-L4)      T-score:  -2.4               Left Femoral Neck                      T-score:  -1.7               Right Femoral Neck                    T-score:  -2.1                             Lumbar (L1-L4) BMD: 0.892            Previous: 0.850                                              Total Hip Mean BMD: 0.750            Previous: 0.722     Comparison is made to another DXA performed on the same Lunar Prodigy  machine on 05/04/2017.          IMPRESSION  Osteopenia (low bone mass)  Degenerative changes of the spine  Recommendations include ensuring  adequate daily Calcium and Vitamin D intake  Follow up scan can be considered in 3 years.     Comparisons are not necessarily valid when precision within the machine has not been determined. Such a comparison has been performed here; one should interpret with caution.   Compared to previous bone densitometry performed on this patient, there is the suggestion of a possible trend towards improvement of the lumbar spine, and no significant change of the total hip.     Current NOF guidelines recommend treatment for patients with the following:  - Prior hip or vertebral fracture  - T-score -2.5 or below  - A 10 year risk of any major osteoporotic fracture >20% or 10 year risk of hip fracture >3%, as calculated using the FRAX calculator (www.shef.ac.uk/FRAX).       FRAX calculation is validated for people who are not on treatment.     DEXA 6/2020:  FINDINGS:               Lumbar Spine (L1-L4)      T-score:  -2.0               Left Femoral Neck            T-score:  -1.5               Right Femoral Neck          T-score:  -1.9                             Lumbar (L1-L4) BMD: 0.945  Previous: 0.892                          Total Hip Mean BMD: 0.755  Previous: 0.750     Comparison is made to another DXA performed on the same Lunar Prodigy  machine on 05/26/2018 and another in 2017.        IMPRESSION  Osteopenia (low bone mass)  Degenerative changes of the spine  Recommendations include ensuring adequate daily Calcium and Vitamin D intake  Follow up scan can be considered in three years.     Comparisons are not necessarily valid when precision within the machine has not been determined. Such a comparison has been performed here; one should interpret with caution.   Compared to previous bone densitometry performed on this patient, there is the suggestion of a possible trend towards improvement of the lumbar spine, and no significant change of the total hip.    All pertinent notes, labs, and images personally reviewed by me.    All pertinent notes, labs and reports done at outside clinic personally reviewed by me.      A/P  Ms.Nicole JOSETTE Hyman is a 65 year old here for the evaluation of:    #1 Osteoporosis:    Risk factors for low bone density include family history, , female, s/p gastric bypass, low BMI, Estrogen deficiency, low Ca intake.  DEXA 2017 consistent with osteoporosis  She received Reclast in June 2017, July 2018, August 2019, August 2020 and tolerated well.  DEXA 2020: There is the suggestion of a possible trend towards improvement of the lumbar spine, and no significant change of the total hip.  Plan:  Given history of gastric bypass as well as GERD will recommend to continue with intravenous treatment.  Plan to continue Reclast for next 1-2 years. Consider drug holiday after that.  Plan to continue Reclast  Next Reclast August 2021- she has upcoming appointment scheduled.  Following that next Reclast August 2022  DEXA in August 2022 onwards-preferably at [facility].  Follow-up after above in clinic  (Please call clinic in June 2022 to get orders for Reclast and DEXA scan placed in chart)      #2. H/o gastric bypass:  On vit D replacement.    #3. HRT:  On testosterone and estradiol  Followed by outside provider.  Discussed that it is not a first line t/t for osteoporosis in post menopausal females.    The pt was advised to    Maintain an adequate calcium and vitamin D intake and to supplement vitamin D if needed to maintain serum levels of 25 hydroxy D (25 OH D) between 30-60 ng/ml.    Limit alcohol intake to no more than 2 servings per day.    Limit caffeine intake.    Maintain an active lifestyle including weight-bearing exercises for at least 30 mins daily.    Take measures to reduce the risk of falling.    Instructions on RECLAT. Treatment with bisphosphonate therapy will decrease fracture risk 50-70%.   There is risk of osteonecrosis of the jaw in patients using bisphosphonates is approximately 1/1700-1/100,000,  with development most likely related to invasive dental procedures. If an invasive dental procedure was necessary, preferably stop the bisphosphonate approximately 3 months prior to reduce the risk. Let your dentist know that you are on this medication.  The data available at this time suggests that there is probably a small increase risk of atypical (nontraumatic) subtrochanteric fractures of the femur in patients on bisphosphonate therapy compared to those not on it. One large study suggested that for every 100 fractures prevented with bisphosphonate therapy, less than one femur fracture will occur. Other studies suggest one episode per 2,500 patient years. Patient should call with leg pain.      Discussed indications, risks and benefits of all medications prescribed, and answered questions to patient's satisfaction.      More than 50% of the time spent with Ms. Hyman on counseling / coordinating her care.      Follow-up:  1 year    Fe Reyna MD  Endocrinology  [facility]/[place]  CC: Dexter Floyd    Addendum to above note and clinic visit:    Labs reviewed.    See result note/telephone encounter.

## 2021-08-15 DIAGNOSIS — N95.2 VAGINAL ATROPHY: ICD-10-CM

## 2021-08-17 RX ORDER — ESTRADIOL 0.1 MG/G
1 CREAM VAGINAL
Qty: 42.5 G | Refills: 0 | Status: SHIPPED | OUTPATIENT
Start: 2021-08-19 | End: 2021-08-20

## 2021-08-17 NOTE — TELEPHONE ENCOUNTER
Prescription approved per [facility] Refill Protocol.    Soni Banks RN on 8/17/2021 at 11:27 AM

## 2021-08-18 ENCOUNTER — TELEPHONE (OUTPATIENT)
Dept: ENDOCRINOLOGY | Facility: CLINIC | Age: 66
End: 2021-08-18

## 2021-08-18 DIAGNOSIS — Z98.84 STATUS POST BARIATRIC SURGERY: ICD-10-CM

## 2021-08-18 DIAGNOSIS — Z92.29 PERSONAL HISTORY OF DRUG THERAPY: Primary | ICD-10-CM

## 2021-08-18 DIAGNOSIS — M81.0 OSTEOPOROSIS, UNSPECIFIED OSTEOPOROSIS TYPE, UNSPECIFIED PATHOLOGICAL FRACTURE PRESENCE: ICD-10-CM

## 2021-08-18 RX ORDER — HEPARIN SODIUM,PORCINE 10 UNIT/ML
5 VIAL (ML) INTRAVENOUS
Status: CANCELLED | OUTPATIENT
Start: 2021-08-19

## 2021-08-18 RX ORDER — ALBUTEROL SULFATE 90 UG/1
1-2 AEROSOL, METERED RESPIRATORY (INHALATION)
Status: CANCELLED
Start: 2021-08-19

## 2021-08-18 RX ORDER — ZOLEDRONIC ACID 5 MG/100ML
5 INJECTION, SOLUTION INTRAVENOUS ONCE
Status: CANCELLED
Start: 2021-08-19 | End: 2021-08-19

## 2021-08-18 RX ORDER — EPINEPHRINE 1 MG/ML
0.3 INJECTION, SOLUTION, CONCENTRATE INTRAVENOUS EVERY 5 MIN PRN
Status: CANCELLED | OUTPATIENT
Start: 2021-08-19

## 2021-08-18 RX ORDER — DIPHENHYDRAMINE HYDROCHLORIDE 50 MG/ML
50 INJECTION INTRAMUSCULAR; INTRAVENOUS
Status: CANCELLED
Start: 2021-08-19

## 2021-08-18 RX ORDER — MEPERIDINE HYDROCHLORIDE 25 MG/ML
25 INJECTION INTRAMUSCULAR; INTRAVENOUS; SUBCUTANEOUS EVERY 30 MIN PRN
Status: CANCELLED | OUTPATIENT
Start: 2021-08-19

## 2021-08-18 RX ORDER — HEPARIN SODIUM (PORCINE) LOCK FLUSH IV SOLN 100 UNIT/ML 100 UNIT/ML
5 SOLUTION INTRAVENOUS
Status: CANCELLED | OUTPATIENT
Start: 2021-08-19

## 2021-08-18 RX ORDER — METHYLPREDNISOLONE SODIUM SUCCINATE 125 MG/2ML
125 INJECTION, POWDER, LYOPHILIZED, FOR SOLUTION INTRAMUSCULAR; INTRAVENOUS
Status: CANCELLED
Start: 2021-08-19

## 2021-08-18 RX ORDER — ALBUTEROL SULFATE 0.83 MG/ML
2.5 SOLUTION RESPIRATORY (INHALATION)
Status: CANCELLED | OUTPATIENT
Start: 2021-08-19

## 2021-08-18 RX ORDER — NALOXONE HYDROCHLORIDE 0.4 MG/ML
0.2 INJECTION, SOLUTION INTRAMUSCULAR; INTRAVENOUS; SUBCUTANEOUS
Status: CANCELLED | OUTPATIENT
Start: 2021-08-19

## 2021-08-18 NOTE — TELEPHONE ENCOUNTER
----- Message from Martin Meese, RPH sent at 8/18/2021  1:19 PM CDT -----  Regarding: need new Reclast orders  Danika [address] Dr. is coming in on 8/20 for reclast.  Can you please renew her plan?    Sterling Coto

## 2021-08-20 ENCOUNTER — INFUSION THERAPY VISIT (OUTPATIENT)
Dept: INFUSION THERAPY | Facility: CLINIC | Age: 66
End: 2021-08-20
Attending: INTERNAL MEDICINE
Payer: COMMERCIAL

## 2021-08-20 VITALS
SYSTOLIC BLOOD PRESSURE: 100 MMHG | RESPIRATION RATE: 16 BRPM | DIASTOLIC BLOOD PRESSURE: 67 MMHG | OXYGEN SATURATION: 98 % | HEART RATE: 76 BPM | TEMPERATURE: 98.6 F

## 2021-08-20 DIAGNOSIS — Z98.84 STATUS POST BARIATRIC SURGERY: Primary | ICD-10-CM

## 2021-08-20 DIAGNOSIS — Z92.29 PERSONAL HISTORY OF DRUG THERAPY: ICD-10-CM

## 2021-08-20 DIAGNOSIS — N95.2 VAGINAL ATROPHY: ICD-10-CM

## 2021-08-20 DIAGNOSIS — M81.0 OSTEOPOROSIS, UNSPECIFIED OSTEOPOROSIS TYPE, UNSPECIFIED PATHOLOGICAL FRACTURE PRESENCE: ICD-10-CM

## 2021-08-20 LAB
CALCIUM SERPL-MCNC: 9.3 MG/DL (ref 8.5–10.1)
CREAT SERPL-MCNC: 0.63 MG/DL (ref 0.52–1.04)
GFR SERPL CREATININE-BSD FRML MDRD: >90 ML/MIN/1.73M2

## 2021-08-20 PROCEDURE — 250N000011 HC RX IP 250 OP 636: Performed by: INTERNAL MEDICINE

## 2021-08-20 PROCEDURE — 82310 ASSAY OF CALCIUM: CPT | Performed by: INTERNAL MEDICINE

## 2021-08-20 PROCEDURE — 36415 COLL VENOUS BLD VENIPUNCTURE: CPT | Performed by: INTERNAL MEDICINE

## 2021-08-20 PROCEDURE — 96365 THER/PROPH/DIAG IV INF INIT: CPT

## 2021-08-20 PROCEDURE — 82565 ASSAY OF CREATININE: CPT | Performed by: INTERNAL MEDICINE

## 2021-08-20 RX ORDER — ALBUTEROL SULFATE 90 UG/1
1-2 AEROSOL, METERED RESPIRATORY (INHALATION)
Status: CANCELLED
Start: 2021-08-20

## 2021-08-20 RX ORDER — EPINEPHRINE 1 MG/ML
0.3 INJECTION, SOLUTION INTRAMUSCULAR; SUBCUTANEOUS EVERY 5 MIN PRN
Status: CANCELLED | OUTPATIENT
Start: 2021-08-20

## 2021-08-20 RX ORDER — ZOLEDRONIC ACID 5 MG/100ML
5 INJECTION, SOLUTION INTRAVENOUS ONCE
Status: COMPLETED | OUTPATIENT
Start: 2021-08-20 | End: 2021-08-20

## 2021-08-20 RX ORDER — ESTRADIOL 0.1 MG/G
CREAM VAGINAL
Qty: 42.5 G | Refills: 0 | Status: SHIPPED | OUTPATIENT
Start: 2021-08-20 | End: 2021-11-16

## 2021-08-20 RX ORDER — ZOLEDRONIC ACID 5 MG/100ML
5 INJECTION, SOLUTION INTRAVENOUS ONCE
Status: CANCELLED
Start: 2021-08-20 | End: 2021-08-20

## 2021-08-20 RX ORDER — DIPHENHYDRAMINE HYDROCHLORIDE 50 MG/ML
50 INJECTION INTRAMUSCULAR; INTRAVENOUS
Status: CANCELLED
Start: 2021-08-20

## 2021-08-20 RX ORDER — MEPERIDINE HYDROCHLORIDE 25 MG/ML
25 INJECTION INTRAMUSCULAR; INTRAVENOUS; SUBCUTANEOUS EVERY 30 MIN PRN
Status: CANCELLED | OUTPATIENT
Start: 2021-08-20

## 2021-08-20 RX ORDER — HEPARIN SODIUM (PORCINE) LOCK FLUSH IV SOLN 100 UNIT/ML 100 UNIT/ML
5 SOLUTION INTRAVENOUS
Status: CANCELLED | OUTPATIENT
Start: 2021-08-20

## 2021-08-20 RX ORDER — ALBUTEROL SULFATE 0.83 MG/ML
2.5 SOLUTION RESPIRATORY (INHALATION)
Status: CANCELLED | OUTPATIENT
Start: 2021-08-20

## 2021-08-20 RX ORDER — NALOXONE HYDROCHLORIDE 0.4 MG/ML
0.2 INJECTION, SOLUTION INTRAMUSCULAR; INTRAVENOUS; SUBCUTANEOUS
Status: CANCELLED | OUTPATIENT
Start: 2021-08-20

## 2021-08-20 RX ORDER — METHYLPREDNISOLONE SODIUM SUCCINATE 125 MG/2ML
125 INJECTION, POWDER, LYOPHILIZED, FOR SOLUTION INTRAMUSCULAR; INTRAVENOUS
Status: CANCELLED
Start: 2021-08-20

## 2021-08-20 RX ORDER — HEPARIN SODIUM,PORCINE 10 UNIT/ML
5 VIAL (ML) INTRAVENOUS
Status: CANCELLED | OUTPATIENT
Start: 2021-08-20

## 2021-08-20 RX ADMIN — ZOLEDRONIC ACID 5 MG: 0.05 INJECTION, SOLUTION INTRAVENOUS at 15:28

## 2021-08-20 NOTE — PROGRESS NOTES
Infusion Nursing Note:  Danika Hyman presents today for labs and Reclast.    Patient seen by provider today: No   present during visit today: Not Applicable.    Note: Confirmed patient is taking calcium and vitamin D.  Patient denies recent or upcoming dental procedures.      Intravenous Access:  Labs drawn without difficulty.  Peripheral IV placed.    Treatment Conditions:  Results reviewed, labs MET treatment parameters, ok to proceed with treatment.    Creatinine 0.63  Calcium 9.3      Post Infusion Assessment:  Patient tolerated infusion without incident.  Blood return noted pre and post infusion.  Site patent and intact, free from redness, edema or discomfort.  No evidence of extravasations.  Access discontinued per protocol.       Discharge Plan:   Patient discharged in stable condition accompanied by: self.  Departure Mode: Ambulatory.      Ashlyn Martin RN

## 2021-08-20 NOTE — LETTER
[facility]  [address], [address]  [place] [address]  [phone]        August 23, 2021    Danika Hyman  [address] [address]            Dear Ms. Danika Hyman:    Recently done labs in endocrinology clinic are in acceptable range.     Here is a copy for your records.   Follow-up in endocrinology Clinic as scheduled/discussed.   Please call endocrinology clinic (nurse line: [phone]) if questions.     Fe Reyna MD   Endocrinology   [facility]/[place]   August 23, 2021     Results for orders placed or performed in visit on 08/20/21   Creatinine     Status: Normal   Result Value Ref Range    Creatinine 0.63 0.52 - 1.04 mg/dL    GFR Estimate >90 >60 mL/min/1.73m2   Calcium     Status: Normal   Result Value Ref Range    Calcium 9.3 8.5 - 10.1 mg/dL

## 2021-08-23 NOTE — RESULT ENCOUNTER NOTE
Recently done labs in endocrinology clinic are in acceptable range.    Here is a copy for your records.  Follow-up in endocrinology Clinic as scheduled/discussed.  Please call endocrinology clinic (nurse line: [phone]) if questions.    Fe Reyna MD  Endocrinology   [facility]/[place]  August 23, 2021

## 2021-09-04 ENCOUNTER — HEALTH MAINTENANCE LETTER (OUTPATIENT)
Age: 66
End: 2021-09-04

## 2021-09-20 DIAGNOSIS — R68.82 DECREASED LIBIDO: ICD-10-CM

## 2021-09-20 DIAGNOSIS — N95.1 SYMPTOMATIC MENOPAUSAL OR FEMALE CLIMACTERIC STATES: ICD-10-CM

## 2021-09-21 NOTE — TELEPHONE ENCOUNTER
Routing refill request to provider for review/approval because:  Drug not on the [facility] refill protocol     Abad PLAZA RN

## 2021-09-22 ENCOUNTER — TELEPHONE (OUTPATIENT)
Dept: PEDIATRICS | Facility: CLINIC | Age: 66
End: 2021-09-22

## 2021-09-22 DIAGNOSIS — N95.1 SYMPTOMATIC MENOPAUSAL OR FEMALE CLIMACTERIC STATES: ICD-10-CM

## 2021-09-22 NOTE — TELEPHONE ENCOUNTER
[place] with [facility]    Looking for clarification on:    ESTRADIOL 0.5/ESTRIOL 0.25 MG/GM CREAM    Questions is: Quantity applied?    Callback, [phone]    Thank you  René ARMENDARIZ

## 2021-09-22 NOTE — TELEPHONE ENCOUNTER
Dr. Floyd-  Can you please clarify quantity to be applied? See below. Kimberley Godinez RN on 9/22/2021 at 2:28 PM

## 2021-09-23 NOTE — TELEPHONE ENCOUNTER
"I looked at the label when I got home- it says 4 clicks/ one gram\" once a day to forearm-  Does that help?  The testosterone and progesterone are 1/8 t - progesterone is one a day to forearm- the testosterone is 3 times a week   "

## 2021-10-30 ENCOUNTER — HEALTH MAINTENANCE LETTER (OUTPATIENT)
Age: 66
End: 2021-10-30

## 2021-11-15 DIAGNOSIS — N95.2 VAGINAL ATROPHY: ICD-10-CM

## 2021-11-16 ENCOUNTER — MYC REFILL (OUTPATIENT)
Dept: PEDIATRICS | Facility: CLINIC | Age: 66
End: 2021-11-16
Payer: COMMERCIAL

## 2021-11-16 DIAGNOSIS — N95.2 VAGINAL ATROPHY: ICD-10-CM

## 2021-11-16 RX ORDER — ESTRADIOL 0.1 MG/G
CREAM VAGINAL
Qty: 42.5 G | Refills: 0 | Status: CANCELLED | OUTPATIENT
Start: 2021-11-16

## 2021-11-16 RX ORDER — ESTRADIOL 0.1 MG/G
CREAM VAGINAL
Qty: 42.5 G | Refills: 0 | Status: SHIPPED | OUTPATIENT
Start: 2021-11-16 | End: 2021-11-18

## 2021-11-16 NOTE — TELEPHONE ENCOUNTER
Patient has upcoming appointment w/ PCP 1/5/22. Prescription approved per [facility] Refill Protocol.  Abad PLAZA RN

## 2021-11-20 ENCOUNTER — MYC MEDICAL ADVICE (OUTPATIENT)
Dept: PEDIATRICS | Facility: CLINIC | Age: 66
End: 2021-11-20
Payer: COMMERCIAL

## 2022-01-01 SDOH — ECONOMIC STABILITY: TRANSPORTATION INSECURITY
IN THE PAST 12 MONTHS, HAS LACK OF TRANSPORTATION KEPT YOU FROM MEETINGS, WORK, OR FROM GETTING THINGS NEEDED FOR DAILY LIVING?: NO

## 2022-01-01 SDOH — HEALTH STABILITY: PHYSICAL HEALTH: ON AVERAGE, HOW MANY DAYS PER WEEK DO YOU ENGAGE IN MODERATE TO STRENUOUS EXERCISE (LIKE A BRISK WALK)?: 7 DAYS

## 2022-01-01 SDOH — ECONOMIC STABILITY: INCOME INSECURITY: IN THE LAST 12 MONTHS, WAS THERE A TIME WHEN YOU WERE NOT ABLE TO PAY THE MORTGAGE OR RENT ON TIME?: NO

## 2022-01-01 SDOH — ECONOMIC STABILITY: FOOD INSECURITY: WITHIN THE PAST 12 MONTHS, YOU WORRIED THAT YOUR FOOD WOULD RUN OUT BEFORE YOU GOT MONEY TO BUY MORE.: NEVER TRUE

## 2022-01-01 SDOH — ECONOMIC STABILITY: FOOD INSECURITY: WITHIN THE PAST 12 MONTHS, THE FOOD YOU BOUGHT JUST DIDN'T LAST AND YOU DIDN'T HAVE MONEY TO GET MORE.: NEVER TRUE

## 2022-01-01 SDOH — ECONOMIC STABILITY: INCOME INSECURITY: HOW HARD IS IT FOR YOU TO PAY FOR THE VERY BASICS LIKE FOOD, HOUSING, MEDICAL CARE, AND HEATING?: NOT HARD AT ALL

## 2022-01-01 SDOH — ECONOMIC STABILITY: TRANSPORTATION INSECURITY
IN THE PAST 12 MONTHS, HAS THE LACK OF TRANSPORTATION KEPT YOU FROM MEDICAL APPOINTMENTS OR FROM GETTING MEDICATIONS?: NO

## 2022-01-01 SDOH — HEALTH STABILITY: PHYSICAL HEALTH: ON AVERAGE, HOW MANY MINUTES DO YOU ENGAGE IN EXERCISE AT THIS LEVEL?: 40 MIN

## 2022-01-01 ASSESSMENT — LIFESTYLE VARIABLES
HOW OFTEN DO YOU HAVE SIX OR MORE DRINKS ON ONE OCCASION: NEVER
HOW OFTEN DO YOU HAVE A DRINK CONTAINING ALCOHOL: 4 OR MORE TIMES A WEEK
HOW MANY STANDARD DRINKS CONTAINING ALCOHOL DO YOU HAVE ON A TYPICAL DAY: 1 OR 2

## 2022-01-01 ASSESSMENT — ENCOUNTER SYMPTOMS
EYE PAIN: 0
DYSURIA: 0
ABDOMINAL PAIN: 0
BREAST MASS: 0
NERVOUS/ANXIOUS: 0
SHORTNESS OF BREATH: 0
HEADACHES: 0
HEMATOCHEZIA: 0
ARTHRALGIAS: 0
HEARTBURN: 1
JOINT SWELLING: 0
PALPITATIONS: 0
CONSTIPATION: 0
WEAKNESS: 0
DIARRHEA: 0
MYALGIAS: 0
SORE THROAT: 0
NAUSEA: 0
DIZZINESS: 0
PARESTHESIAS: 0
CHILLS: 0
FREQUENCY: 0
HEMATURIA: 0
FEVER: 0
COUGH: 0

## 2022-01-01 ASSESSMENT — SOCIAL DETERMINANTS OF HEALTH (SDOH)
HOW OFTEN DO YOU GET TOGETHER WITH FRIENDS OR RELATIVES?: MORE THAN THREE TIMES A WEEK
IN A TYPICAL WEEK, HOW MANY TIMES DO YOU TALK ON THE PHONE WITH FAMILY, FRIENDS, OR NEIGHBORS?: MORE THAN THREE TIMES A WEEK
HOW OFTEN DO YOU ATTEND CHURCH OR RELIGIOUS SERVICES?: 1 TO 4 TIMES PER YEAR
DO YOU BELONG TO ANY CLUBS OR ORGANIZATIONS SUCH AS CHURCH GROUPS UNIONS, FRATERNAL OR ATHLETIC GROUPS, OR SCHOOL GROUPS?: YES

## 2022-01-05 ENCOUNTER — OFFICE VISIT (OUTPATIENT)
Dept: PEDIATRICS | Facility: CLINIC | Age: 67
End: 2022-01-05
Payer: COMMERCIAL

## 2022-01-05 VITALS
SYSTOLIC BLOOD PRESSURE: 114 MMHG | DIASTOLIC BLOOD PRESSURE: 62 MMHG | RESPIRATION RATE: 14 BRPM | HEIGHT: 64 IN | OXYGEN SATURATION: 97 % | TEMPERATURE: 97.8 F | HEART RATE: 66 BPM | WEIGHT: 137.7 LBS | BODY MASS INDEX: 23.51 KG/M2

## 2022-01-05 DIAGNOSIS — N95.2 VAGINAL ATROPHY: ICD-10-CM

## 2022-01-05 DIAGNOSIS — N95.1 SYMPTOMATIC MENOPAUSAL OR FEMALE CLIMACTERIC STATES: ICD-10-CM

## 2022-01-05 DIAGNOSIS — K21.9 GASTROESOPHAGEAL REFLUX DISEASE WITHOUT ESOPHAGITIS: ICD-10-CM

## 2022-01-05 DIAGNOSIS — R68.82 DECREASED LIBIDO: ICD-10-CM

## 2022-01-05 DIAGNOSIS — Z13.6 CARDIOVASCULAR SCREENING; LDL GOAL LESS THAN 160: ICD-10-CM

## 2022-01-05 DIAGNOSIS — M81.0 OSTEOPOROSIS, UNSPECIFIED OSTEOPOROSIS TYPE, UNSPECIFIED PATHOLOGICAL FRACTURE PRESENCE: ICD-10-CM

## 2022-01-05 DIAGNOSIS — Z00.00 ROUTINE GENERAL MEDICAL EXAMINATION AT A HEALTH CARE FACILITY: Primary | ICD-10-CM

## 2022-01-05 DIAGNOSIS — Z98.84 STATUS POST BARIATRIC SURGERY: ICD-10-CM

## 2022-01-05 LAB
ALBUMIN SERPL-MCNC: 3.6 G/DL (ref 3.4–5)
ALP SERPL-CCNC: 51 U/L (ref 40–150)
ALT SERPL W P-5'-P-CCNC: 30 U/L (ref 0–50)
ANION GAP SERPL CALCULATED.3IONS-SCNC: 3 MMOL/L (ref 3–14)
AST SERPL W P-5'-P-CCNC: 25 U/L (ref 0–45)
BILIRUB SERPL-MCNC: 0.4 MG/DL (ref 0.2–1.3)
BUN SERPL-MCNC: 28 MG/DL (ref 7–30)
CALCIUM SERPL-MCNC: 9.1 MG/DL (ref 8.5–10.1)
CHLORIDE BLD-SCNC: 105 MMOL/L (ref 94–109)
CHOLEST SERPL-MCNC: 171 MG/DL
CO2 SERPL-SCNC: 29 MMOL/L (ref 20–32)
CREAT SERPL-MCNC: 0.74 MG/DL (ref 0.52–1.04)
DEPRECATED CALCIDIOL+CALCIFEROL SERPL-MC: 38 UG/L (ref 20–75)
FASTING STATUS PATIENT QL REPORTED: NORMAL
GFR SERPL CREATININE-BSD FRML MDRD: 90 ML/MIN/1.73M2
GLUCOSE BLD-MCNC: 77 MG/DL (ref 70–99)
HDLC SERPL-MCNC: 108 MG/DL
LDLC SERPL CALC-MCNC: 54 MG/DL
NONHDLC SERPL-MCNC: 63 MG/DL
POTASSIUM BLD-SCNC: 4 MMOL/L (ref 3.4–5.3)
PROT SERPL-MCNC: 7.3 G/DL (ref 6.8–8.8)
SODIUM SERPL-SCNC: 137 MMOL/L (ref 133–144)
TRIGL SERPL-MCNC: 43 MG/DL

## 2022-01-05 PROCEDURE — 80053 COMPREHEN METABOLIC PANEL: CPT | Performed by: INTERNAL MEDICINE

## 2022-01-05 PROCEDURE — 80061 LIPID PANEL: CPT | Performed by: INTERNAL MEDICINE

## 2022-01-05 PROCEDURE — 99396 PREV VISIT EST AGE 40-64: CPT | Performed by: INTERNAL MEDICINE

## 2022-01-05 PROCEDURE — 82306 VITAMIN D 25 HYDROXY: CPT | Performed by: INTERNAL MEDICINE

## 2022-01-05 PROCEDURE — 36415 COLL VENOUS BLD VENIPUNCTURE: CPT | Performed by: INTERNAL MEDICINE

## 2022-01-05 RX ORDER — FAMOTIDINE 20 MG/1
20 TABLET, FILM COATED ORAL 2 TIMES DAILY
Qty: 60 TABLET | Refills: 0 | COMMUNITY
Start: 2022-01-05

## 2022-01-05 ASSESSMENT — ENCOUNTER SYMPTOMS
FEVER: 0
JOINT SWELLING: 0
SHORTNESS OF BREATH: 0
ARTHRALGIAS: 0
HEMATOCHEZIA: 0
NERVOUS/ANXIOUS: 0
SORE THROAT: 0
COUGH: 0
EYE PAIN: 0
HEMATURIA: 0
DYSURIA: 0
FREQUENCY: 0
WEAKNESS: 0
NAUSEA: 0
PALPITATIONS: 0
CONSTIPATION: 0
DIZZINESS: 0
CHILLS: 0
ABDOMINAL PAIN: 0
HEADACHES: 0
MYALGIAS: 0
HEARTBURN: 1
BREAST MASS: 0
DIARRHEA: 0
PARESTHESIAS: 0

## 2022-01-05 ASSESSMENT — MIFFLIN-ST. JEOR: SCORE: 1159.6

## 2022-01-05 NOTE — PROGRESS NOTES
SUBJECTIVE:   CC: Danika Hyman is an 66 year old woman who presents for preventive health visit.   Patient has been advised of split billing requirements and indicates understanding: Yes     Healthy Habits:     Getting at least 3 servings of Calcium per day:  Yes    Bi-annual eye exam:  Yes    Dental care twice a year:  Yes    Sleep apnea or symptoms of sleep apnea:  None    Diet:  Carbohydrate counting and Breakfast skipped    Frequency of exercise:  2-3 days/week    Duration of exercise:  45-60 minutes    Taking medications regularly:  Yes    Medication side effects:  None    PHQ-2 Total Score: 0    Additional concerns today:  Yes    Today's PHQ-2 Score:   PHQ-2 ( 1999 [employer]) 1/1/2022   Q1: Little interest or pleasure in doing things 0   Q2: Feeling down, depressed or hopeless 0   PHQ-2 Score 0   PHQ-2 Total Score (12-17 Years)- Positive if 3 or more points; Administer PHQ-A if positive -   Q1: Little interest or pleasure in doing things Not at all   Q2: Feeling down, depressed or hopeless Not at all   PHQ-2 Score 0     Abuse: Current or Past (Physical, Sexual or Emotional) - No  Do you feel safe in your environment? Yes    Social History     Tobacco Use     Smoking status: Never Smoker     Smokeless tobacco: Never Used   Substance Use Topics     Alcohol use: Yes     Comment: 4 glasses a week     Alcohol Use 1/1/2022   Prescreen: >3 drinks/day or >7 drinks/week? Yes   Prescreen: >3 drinks/day or >7 drinks/week? -   AUDIT SCORE  6     Reviewed orders with patient.  Reviewed health maintenance and updated orders accordingly - Yes  Patient Active Problem List   Diagnosis     Gastroesophageal reflux disease without esophagitis     Status post bariatric surgery     CARDIOVASCULAR SCREENING; LDL GOAL LESS THAN 160     Osteoporosis     Personal history of drug therapy     Symptomatic menopausal or female climacteric states     Decreased libido     Vaginal atrophy     Past Surgical History:   Procedure Laterality Date      CATARACT IOL, RT/LT  2016     [facility] NONSPECIFIC PROCEDURE      wisdom teeth extraction,abstracted     [facility] NONSPECIFIC PROCEDURE      T&A,abstracted     [facility] NONSPECIFIC PROCEDURE      2 natural childbirths,abstracted     [facility] NONSPECIFIC PROCEDURE  1982    gastric stapling ast [facility]       Social History     Tobacco Use     Smoking status: Never Smoker     Smokeless tobacco: Never Used   Substance Use Topics     Alcohol use: Yes     Comment: 4 glasses a week     Family History   Problem Relation Age of Onset     Arthritis Father         B:       Diabetes Father      Respiratory Father      Cardiovascular Father      Hypertension Mother      Alcohol/Drug Mother         B: alive     Lung Cancer Mother 89        lung cancer ;  age 91     Family History Negative Sister      Family History Negative Sister      Family History Negative Brother          Current Outpatient Medications   Medication Sig Dispense Refill     CALCIUM PLUS TABS   OR 2 tabs q day       CENTRUM SILVER OR 1 tablet daily       estradiol (ESTRACE) 0.1 MG/GM vaginal cream Place 1 gram vaginally twice per week 42.5 g 1     ESTRADIOL 0.5/ESTRIOL 0.25 MG/GM CREAM Apply 1 g topically daily To forearm 45 g 1     famotidine (PEPCID) 20 MG tablet Take 1 tablet (20 mg) by mouth 2 times daily 60 tablet 0     PROGESTERONE 100MG/G CREAM APPLY 1/8 TEASPOONFUL TO SKIN EVERY DAY 45 g 3     TESTOSTERONE 2 MG/GM CREAM Apply 1/8 teaspoon to skin 2-3 x week 120 g 1     VITAMIN B-12 500 MCG OR TABS 1 sublingual qday 30 5     Zoledronic Acid (RECLAST IV)          Breast Cancer Screening:    Breast CA Risk Assessment (FHS-7) 2022   Do you have a family history of breast, colon, or ovarian cancer? No / Unknown       Pertinent mammograms are reviewed under the imaging tab.    History of abnormal Pap smear: no  PAP / HPV Latest Ref Rng & Units 2010   PAP (Historical) - NIL NIL NIL   HPV16 NEG:Negative Negative - -   HPV18  "NEG:Negative Negative - -   HRHPV NEG:Negative Negative - -     Reviewed and updated as needed this visit by clinical staff  Tobacco  Allergies  Meds   Med Hx  Surg Hx  Fam Hx  Soc Hx       Reviewed and updated as needed this visit by Provider                   Review of Systems   Constitutional: Negative for chills and fever.   HENT: Positive for hearing loss. Negative for congestion, ear pain and sore throat.    Eyes: Negative for pain and visual disturbance.   Respiratory: Negative for cough and shortness of breath.    Cardiovascular: Negative for chest pain, palpitations and peripheral edema.   Gastrointestinal: Positive for heartburn. Negative for abdominal pain, constipation, diarrhea, hematochezia and nausea.   Breasts:  Negative for tenderness, breast mass and discharge.   Genitourinary: Negative for dysuria, frequency, genital sores, hematuria, pelvic pain, urgency, vaginal bleeding and vaginal discharge.   Musculoskeletal: Negative for arthralgias, joint swelling and myalgias.   Skin: Negative for rash.   Neurological: Negative for dizziness, weakness, headaches and paresthesias.   Psychiatric/Behavioral: Negative for mood changes. The patient is not nervous/anxious.         OBJECTIVE:   /62 (BP Location: Right arm, Patient Position: Sitting, Cuff Size: Adult Regular)   Pulse 66   Temp 97.8  F (36.6  C) (Tympanic)   Resp 14   Ht 1.626 m (5' 4\")   Wt 62.5 kg (137 lb 11.2 oz)   LMP 04/17/2009   SpO2 97%   BMI 23.64 kg/m    Physical Exam  GENERAL APPEARANCE: healthy, alert and no distress  EYES: Eyes grossly normal to inspection, PERRL and conjunctivae and sclerae normal  HENT: ear canals and TM's normal, nose and mouth without ulcers or lesions, oropharynx clear and oral mucous membranes moist  NECK: no adenopathy, no asymmetry, masses, or scars and thyroid normal to palpation  RESP: lungs clear to auscultation - no rales, rhonchi or wheezes  CV: regular rate and rhythm, normal S1 S2, no " S3 or S4, no murmur, click or rub, no peripheral edema and peripheral pulses strong  ABDOMEN: soft, nontender, no hepatosplenomegaly, no masses and bowel sounds normal  MS: no musculoskeletal defects are noted and gait is age appropriate without ataxia  SKIN: no suspicious lesions or rashes  NEURO: Normal strength and tone, sensory exam grossly normal, mentation intact and speech normal  PSYCH: mentation appears normal and affect normal/bright    ASSESSMENT/PLAN:   (Z00.00) Routine general medical examination at a health care facility  (primary encounter diagnosis)    (N95.1) Symptomatic menopausal or female climacteric states  Comment:   Plan: pt has continued on estrogen (500mcg daily) and progesterone creams initially prescribed by gynecology.  Will review dosing with MTM and potentially taper estrogen due to increased risk of stroke/clotting/cardiac events and breast cancer associated with HRT    (R68.82) Decreased libido  Comment:   Plan: continues topical tesosterone    (N95.2) Vaginal atrophy  Comment:   Plan: continues topical vaginal estrogen twice weekly  in addition to topical estrogen noted above    (K21.9) Gastroesophageal reflux disease without esophagitis  Comment:   Plan: famotidine (PEPCID) 20 MG tablet          (M81.0) Osteoporosis, unspecified osteoporosis type, unspecified pathological fracture presence  Comment:   Plan: Vitamin D Deficiency          (Z98.84) Status post bariatric surgery    (Z13.6) CARDIOVASCULAR SCREENING; LDL GOAL LESS THAN 160  Comment:   Plan: Comprehensive metabolic panel (BMP + Alb, Alk         Phos, ALT, AST, Total. Bili, TP), Lipid panel         reflex to direct LDL Fasting          COUNSELING:  Reviewed preventive health counseling, as reflected in patient instructions       Regular exercise       Healthy diet/nutrition       Osteoporosis prevention/bone health       Colon cancer screening    Estimated body mass index is 23.64 kg/m  as calculated from the following:     "Height as of this encounter: 1.626 m (5' 4\").    Weight as of this encounter: 62.5 kg (137 lb 11.2 oz).        She reports that she has never smoked. She has never used smokeless tobacco.    Counseling Resources:  ATP IV Guidelines  Pooled Cohorts Equation Calculator  Breast Cancer Risk Calculator  BRCA-Related Cancer Risk Assessment: FHS-7 Tool  FRAX Risk Assessment  ICSI Preventive Guidelines  Dietary Guidelines for Americans, 2010  USDA's MyPlate  ASA Prophylaxis  Lung CA Screening    Dexter Floyd MD  [facility]  "

## 2022-03-18 DIAGNOSIS — N95.1 SYMPTOMATIC MENOPAUSAL OR FEMALE CLIMACTERIC STATES: ICD-10-CM

## 2022-03-22 DIAGNOSIS — N95.1 SYMPTOMATIC MENOPAUSAL OR FEMALE CLIMACTERIC STATES: ICD-10-CM

## 2022-03-27 ENCOUNTER — MYC MEDICAL ADVICE (OUTPATIENT)
Dept: PEDIATRICS | Facility: CLINIC | Age: 67
End: 2022-03-27
Payer: COMMERCIAL

## 2022-04-28 ENCOUNTER — DOCUMENTATION ONLY (OUTPATIENT)
Dept: OTHER | Facility: CLINIC | Age: 67
End: 2022-04-28
Payer: COMMERCIAL

## 2022-04-28 DIAGNOSIS — R68.82 DECREASED LIBIDO: ICD-10-CM

## 2022-05-06 DIAGNOSIS — N95.2 VAGINAL ATROPHY: ICD-10-CM

## 2022-05-09 RX ORDER — ESTRADIOL 0.1 MG/G
CREAM VAGINAL
Qty: 42.5 G | Refills: 6 | Status: SHIPPED | OUTPATIENT
Start: 2022-05-09 | End: 2022-07-07

## 2022-05-09 NOTE — TELEPHONE ENCOUNTER
Routing refill request to provider for review/approval because:  QUESTION    From 1/5/22 VISIT w/ Dr. Floyd     Routing refill request to provider for review/approval because:  (N95.1) Symptomatic menopausal or female climacteric states  Comment:   Plan: pt has continued on estrogen (500mcg daily) and progesterone creams initially prescribed by gynecology.  Will review dosing with MTM and potentially taper estrogen due to increased risk of stroke/clotting/cardiac events and breast cancer associated with HRT    Not sure if should be on or ?     Lynette SARAVIA RN

## 2022-06-15 ENCOUNTER — MYC MEDICAL ADVICE (OUTPATIENT)
Dept: ENDOCRINOLOGY | Facility: CLINIC | Age: 67
End: 2022-06-15
Payer: COMMERCIAL

## 2022-06-15 DIAGNOSIS — Z98.84 STATUS POST BARIATRIC SURGERY: ICD-10-CM

## 2022-06-15 DIAGNOSIS — M81.0 OSTEOPOROSIS, UNSPECIFIED OSTEOPOROSIS TYPE, UNSPECIFIED PATHOLOGICAL FRACTURE PRESENCE: Primary | ICD-10-CM

## 2022-06-15 DIAGNOSIS — Z92.29 PERSONAL HISTORY OF DRUG THERAPY: ICD-10-CM

## 2022-06-16 NOTE — TELEPHONE ENCOUNTER
LOV:8/4/21    Per notes:    Next Reclast August 2021- she has upcoming appointment scheduled.  Following that next Reclast August 2022  DEXA in August 2022 onwards-preferably at [facility].  Follow-up after above in clinic  (Please call clinic in June 2022 to get orders for Reclast and DEXA scan placed in chart)  Follow up in 1 year.       Reclast orders needed.

## 2022-06-20 RX ORDER — ZOLEDRONIC ACID 5 MG/100ML
5 INJECTION, SOLUTION INTRAVENOUS ONCE
Status: CANCELLED
Start: 2022-08-01

## 2022-06-20 RX ORDER — HEPARIN SODIUM (PORCINE) LOCK FLUSH IV SOLN 100 UNIT/ML 100 UNIT/ML
5 SOLUTION INTRAVENOUS
Status: CANCELLED | OUTPATIENT
Start: 2022-08-01

## 2022-06-20 RX ORDER — MEPERIDINE HYDROCHLORIDE 25 MG/ML
25 INJECTION INTRAMUSCULAR; INTRAVENOUS; SUBCUTANEOUS EVERY 30 MIN PRN
Status: CANCELLED | OUTPATIENT
Start: 2022-08-01

## 2022-06-20 RX ORDER — HEPARIN SODIUM,PORCINE 10 UNIT/ML
5 VIAL (ML) INTRAVENOUS
Status: CANCELLED | OUTPATIENT
Start: 2022-08-01

## 2022-06-20 RX ORDER — DIPHENHYDRAMINE HYDROCHLORIDE 50 MG/ML
50 INJECTION INTRAMUSCULAR; INTRAVENOUS
Status: CANCELLED
Start: 2022-08-01

## 2022-06-20 RX ORDER — METHYLPREDNISOLONE SODIUM SUCCINATE 125 MG/2ML
125 INJECTION, POWDER, LYOPHILIZED, FOR SOLUTION INTRAMUSCULAR; INTRAVENOUS
Status: CANCELLED
Start: 2022-08-01

## 2022-06-20 RX ORDER — EPINEPHRINE 1 MG/ML
0.3 INJECTION, SOLUTION, CONCENTRATE INTRAVENOUS EVERY 5 MIN PRN
Status: CANCELLED | OUTPATIENT
Start: 2022-08-01

## 2022-06-20 RX ORDER — ALBUTEROL SULFATE 90 UG/1
1-2 AEROSOL, METERED RESPIRATORY (INHALATION)
Status: CANCELLED
Start: 2022-08-01

## 2022-06-20 RX ORDER — ALBUTEROL SULFATE 0.83 MG/ML
2.5 SOLUTION RESPIRATORY (INHALATION)
Status: CANCELLED | OUTPATIENT
Start: 2022-08-01

## 2022-06-20 RX ORDER — NALOXONE HYDROCHLORIDE 0.4 MG/ML
0.2 INJECTION, SOLUTION INTRAMUSCULAR; INTRAVENOUS; SUBCUTANEOUS
Status: CANCELLED | OUTPATIENT
Start: 2022-08-01

## 2022-06-20 NOTE — TELEPHONE ENCOUNTER
Reclast orders placed.  But I do not see it under therapy plan.  Can you please confirm?  Pt also needs to make clinic visit appointment.

## 2022-06-28 DIAGNOSIS — N95.1 SYMPTOMATIC MENOPAUSAL OR FEMALE CLIMACTERIC STATES: ICD-10-CM

## 2022-07-03 DIAGNOSIS — N95.2 VAGINAL ATROPHY: ICD-10-CM

## 2022-07-06 ENCOUNTER — ANCILLARY PROCEDURE (OUTPATIENT)
Dept: MAMMOGRAPHY | Facility: CLINIC | Age: 67
End: 2022-07-06
Payer: COMMERCIAL

## 2022-07-06 ENCOUNTER — ANCILLARY PROCEDURE (OUTPATIENT)
Dept: BONE DENSITY | Facility: CLINIC | Age: 67
End: 2022-07-06
Payer: COMMERCIAL

## 2022-07-06 DIAGNOSIS — M81.0 OSTEOPOROSIS, UNSPECIFIED OSTEOPOROSIS TYPE, UNSPECIFIED PATHOLOGICAL FRACTURE PRESENCE: ICD-10-CM

## 2022-07-06 DIAGNOSIS — Z12.31 VISIT FOR SCREENING MAMMOGRAM: ICD-10-CM

## 2022-07-06 PROCEDURE — 77080 DXA BONE DENSITY AXIAL: CPT | Performed by: INTERNAL MEDICINE

## 2022-07-06 PROCEDURE — 77063 BREAST TOMOSYNTHESIS BI: CPT | Mod: TC | Performed by: RADIOLOGY

## 2022-07-06 PROCEDURE — 77067 SCR MAMMO BI INCL CAD: CPT | Mod: TC | Performed by: RADIOLOGY

## 2022-07-07 RX ORDER — ESTRADIOL 0.1 MG/G
CREAM VAGINAL
Qty: 42.5 G | Refills: 5 | Status: SHIPPED | OUTPATIENT
Start: 2022-07-07 | End: 2022-12-21

## 2022-07-11 NOTE — RESULT ENCOUNTER NOTE
Labs results/imaging studies results noted. Will discuss in next clinic visit 11/10/22.    Here is a copy for your records.  Follow-up in endocrinology Clinic as scheduled/discussed. We will review these studies/results in further detail at that visit, but feel free to contact us with any other questions in the interim.    Please call endocrinology clinic (nurse line: [phone]) if questions.    Fe Reyna MD

## 2022-08-22 ENCOUNTER — MYC MEDICAL ADVICE (OUTPATIENT)
Dept: PEDIATRICS | Facility: CLINIC | Age: 67
End: 2022-08-22

## 2022-09-09 ENCOUNTER — INFUSION THERAPY VISIT (OUTPATIENT)
Dept: INFUSION THERAPY | Facility: CLINIC | Age: 67
End: 2022-09-09
Attending: INTERNAL MEDICINE
Payer: COMMERCIAL

## 2022-09-09 VITALS
RESPIRATION RATE: 18 BRPM | DIASTOLIC BLOOD PRESSURE: 73 MMHG | WEIGHT: 128.6 LBS | OXYGEN SATURATION: 97 % | TEMPERATURE: 98.3 F | HEART RATE: 65 BPM | BODY MASS INDEX: 22.07 KG/M2 | SYSTOLIC BLOOD PRESSURE: 108 MMHG

## 2022-09-09 DIAGNOSIS — M81.0 OSTEOPOROSIS, UNSPECIFIED OSTEOPOROSIS TYPE, UNSPECIFIED PATHOLOGICAL FRACTURE PRESENCE: ICD-10-CM

## 2022-09-09 DIAGNOSIS — Z92.29 PERSONAL HISTORY OF DRUG THERAPY: Primary | ICD-10-CM

## 2022-09-09 DIAGNOSIS — Z98.84 STATUS POST BARIATRIC SURGERY: ICD-10-CM

## 2022-09-09 LAB
CALCIUM SERPL-MCNC: 9.3 MG/DL (ref 8.8–10.2)
CREAT SERPL-MCNC: 0.94 MG/DL (ref 0.51–0.95)
GFR SERPL CREATININE-BSD FRML MDRD: 67 ML/MIN/1.73M2

## 2022-09-09 PROCEDURE — 96365 THER/PROPH/DIAG IV INF INIT: CPT

## 2022-09-09 PROCEDURE — 82310 ASSAY OF CALCIUM: CPT | Performed by: INTERNAL MEDICINE

## 2022-09-09 PROCEDURE — 36415 COLL VENOUS BLD VENIPUNCTURE: CPT | Performed by: INTERNAL MEDICINE

## 2022-09-09 PROCEDURE — 82565 ASSAY OF CREATININE: CPT | Performed by: INTERNAL MEDICINE

## 2022-09-09 PROCEDURE — 250N000011 HC RX IP 250 OP 636: Performed by: INTERNAL MEDICINE

## 2022-09-09 RX ORDER — ALBUTEROL SULFATE 0.83 MG/ML
2.5 SOLUTION RESPIRATORY (INHALATION)
Status: CANCELLED | OUTPATIENT
Start: 2022-09-09

## 2022-09-09 RX ORDER — DIPHENHYDRAMINE HYDROCHLORIDE 50 MG/ML
50 INJECTION INTRAMUSCULAR; INTRAVENOUS
Status: CANCELLED
Start: 2022-09-09

## 2022-09-09 RX ORDER — ZOLEDRONIC ACID 5 MG/100ML
5 INJECTION, SOLUTION INTRAVENOUS ONCE
Status: CANCELLED
Start: 2022-09-09

## 2022-09-09 RX ORDER — HEPARIN SODIUM (PORCINE) LOCK FLUSH IV SOLN 100 UNIT/ML 100 UNIT/ML
5 SOLUTION INTRAVENOUS
Status: CANCELLED | OUTPATIENT
Start: 2022-09-09

## 2022-09-09 RX ORDER — HEPARIN SODIUM,PORCINE 10 UNIT/ML
5 VIAL (ML) INTRAVENOUS
Status: CANCELLED | OUTPATIENT
Start: 2022-09-09

## 2022-09-09 RX ORDER — ALBUTEROL SULFATE 90 UG/1
1-2 AEROSOL, METERED RESPIRATORY (INHALATION)
Status: CANCELLED
Start: 2022-09-09

## 2022-09-09 RX ORDER — ZOLEDRONIC ACID 5 MG/100ML
5 INJECTION, SOLUTION INTRAVENOUS ONCE
Status: COMPLETED | OUTPATIENT
Start: 2022-09-09 | End: 2022-09-09

## 2022-09-09 RX ORDER — METHYLPREDNISOLONE SODIUM SUCCINATE 125 MG/2ML
125 INJECTION, POWDER, LYOPHILIZED, FOR SOLUTION INTRAMUSCULAR; INTRAVENOUS
Status: CANCELLED
Start: 2022-09-09

## 2022-09-09 RX ORDER — EPINEPHRINE 1 MG/ML
0.3 INJECTION, SOLUTION INTRAMUSCULAR; SUBCUTANEOUS EVERY 5 MIN PRN
Status: CANCELLED | OUTPATIENT
Start: 2022-09-09

## 2022-09-09 RX ORDER — MEPERIDINE HYDROCHLORIDE 25 MG/ML
25 INJECTION INTRAMUSCULAR; INTRAVENOUS; SUBCUTANEOUS EVERY 30 MIN PRN
Status: CANCELLED | OUTPATIENT
Start: 2022-09-09

## 2022-09-09 RX ORDER — NALOXONE HYDROCHLORIDE 0.4 MG/ML
0.2 INJECTION, SOLUTION INTRAMUSCULAR; INTRAVENOUS; SUBCUTANEOUS
Status: CANCELLED | OUTPATIENT
Start: 2022-09-09

## 2022-09-09 RX ADMIN — ZOLEDRONIC ACID 5 MG: 0.05 INJECTION, SOLUTION INTRAVENOUS at 12:53

## 2022-09-09 ASSESSMENT — PAIN SCALES - GENERAL: PAINLEVEL: NO PAIN (0)

## 2022-09-09 NOTE — PROGRESS NOTES
Infusion Nursing Note:  Danika Hyman presents today for Labs, Reclast.    Patient seen by provider today: No   present during visit today: Not Applicable.    Note: Confirmed with patient that she is taking Calcium and Vitamin D. Denies any recent or upcoming dental procedures, no dental concerns today.     Intravenous Access:  Labs drawn without difficulty.  Peripheral IV placed.    Treatment Conditions:  Results reviewed, labs MET treatment parameters, ok to proceed with treatment.  Calcium: 9.3  Creatinine: 0.94    Post Infusion Assessment:  Patient tolerated infusion without incident.  Blood return noted pre and post infusion.  Site patent and intact, free from redness, edema or discomfort.  No evidence of extravasations.  Access discontinued per protocol.     Discharge Plan:   Discharge instructions reviewed with: Patient.  Patient and/or family verbalized understanding of discharge instructions and all questions answered.  AVS to patient via [employer].  Patient will schedule next appointment as needed.   Patient discharged in stable condition accompanied by: .  Departure Mode: Ambulatory.      Yari Giron RN

## 2022-10-06 ENCOUNTER — MYC MEDICAL ADVICE (OUTPATIENT)
Dept: OBGYN | Facility: CLINIC | Age: 67
End: 2022-10-06

## 2022-10-18 DIAGNOSIS — N95.1 SYMPTOMATIC MENOPAUSAL OR FEMALE CLIMACTERIC STATES: ICD-10-CM

## 2022-10-18 NOTE — TELEPHONE ENCOUNTER
ESTRI/ESTRA/HRT B      Last Written Prescription Date: 06/29/2022  Last Fill Quantity: *45g,   # refills: 1  Last Office Visit: 08/24/2020  Future Office visit:    Next 5 appointments (look out 90 days)    Dec 21, 2022  3:15 PM  MyChart Ob Gyn Office Visit with Tessa Crowe MD  [facility] ([facility] ) [address]  [address]  [facility] [id number] [phone]

## 2022-10-19 NOTE — TELEPHONE ENCOUNTER
Confirmed meds:  vaginal estrogen 1G twice weekly for vaginal atrophy, estrogen 1G topical to forearm daily,   progesterone cream 1/8 teaspoon daily (100mg/G)    They discussed tapering the estrogen but does not to appear it has been decreased.     Has appt with Dr Crowe in Dec.    Okay to fill this topical estrogen cream?

## 2022-10-19 NOTE — TELEPHONE ENCOUNTER
LM for pt to Cb.  Has 2 estradiol cream orders in her chart.  Is seeing Brown in Dec.  This order states to apply to forearm?    Need clarification on what she is taking and if this is needed.    Charissa Oliver RN

## 2022-10-22 ENCOUNTER — HEALTH MAINTENANCE LETTER (OUTPATIENT)
Age: 67
End: 2022-10-22

## 2022-10-25 ENCOUNTER — MYC MEDICAL ADVICE (OUTPATIENT)
Dept: PEDIATRICS | Facility: CLINIC | Age: 67
End: 2022-10-25

## 2022-10-25 DIAGNOSIS — N95.1 SYMPTOMATIC MENOPAUSAL OR FEMALE CLIMACTERIC STATES: ICD-10-CM

## 2022-10-25 NOTE — TELEPHONE ENCOUNTER
Routing to PCP to review prior to reaching out to pharmacy. Patient requesting estradiol to be resent to compounding pharmacy. Per chart review. Patient has two active prescriptions for estradiol. Can you please confirm if patient is supposed to have one or the other or both?  Thanks!  Martine ZHANG RN, BSN

## 2022-10-26 NOTE — TELEPHONE ENCOUNTER
I think a different regimen would be best as oral or intrauterine progesterone. We will discuss this at the upcoming visit, or I am happy to schedule a phone visit to discuss sooner per her preference.   Tessa Crowe MD

## 2022-11-02 ENCOUNTER — LAB (OUTPATIENT)
Dept: LAB | Facility: CLINIC | Age: 67
End: 2022-11-02
Payer: COMMERCIAL

## 2022-11-02 ENCOUNTER — DOCUMENTATION ONLY (OUTPATIENT)
Dept: LAB | Facility: CLINIC | Age: 67
End: 2022-11-02

## 2022-11-02 DIAGNOSIS — M81.0 OSTEOPOROSIS, UNSPECIFIED OSTEOPOROSIS TYPE, UNSPECIFIED PATHOLOGICAL FRACTURE PRESENCE: Primary | ICD-10-CM

## 2022-11-02 DIAGNOSIS — M81.0 OSTEOPOROSIS, UNSPECIFIED OSTEOPOROSIS TYPE, UNSPECIFIED PATHOLOGICAL FRACTURE PRESENCE: ICD-10-CM

## 2022-11-02 LAB — DEPRECATED CALCIDIOL+CALCIFEROL SERPL-MC: 40 UG/L (ref 20–75)

## 2022-11-02 PROCEDURE — 36415 COLL VENOUS BLD VENIPUNCTURE: CPT

## 2022-11-02 PROCEDURE — 82306 VITAMIN D 25 HYDROXY: CPT

## 2022-11-02 NOTE — PROGRESS NOTES
Hi-    Patient came in for labs today on 11/02/2022. She did not have any orders, but she showed a letter from the provider and care team asking for labs before her appointment with provider Axel.  Please place orders for the blood draw. Thank you!    Regards,  ANNETTE Hooper

## 2022-11-07 ASSESSMENT — ENCOUNTER SYMPTOMS
MUSCLE CRAMPS: 1
DIFFICULTY URINATING: 0
DYSURIA: 0
NECK PAIN: 0
ARTHRALGIAS: 0
HEMATURIA: 0
BACK PAIN: 0
MYALGIAS: 1
JOINT SWELLING: 0
MUSCLE WEAKNESS: 0
DECREASED LIBIDO: 1
FLANK PAIN: 0
STIFFNESS: 0
HOT FLASHES: 0

## 2022-11-10 ENCOUNTER — OFFICE VISIT (OUTPATIENT)
Dept: ENDOCRINOLOGY | Facility: CLINIC | Age: 67
End: 2022-11-10
Payer: COMMERCIAL

## 2022-11-10 VITALS
OXYGEN SATURATION: 97 % | DIASTOLIC BLOOD PRESSURE: 65 MMHG | BODY MASS INDEX: 21.89 KG/M2 | HEIGHT: 64 IN | WEIGHT: 128.2 LBS | TEMPERATURE: 97.6 F | RESPIRATION RATE: 16 BRPM | SYSTOLIC BLOOD PRESSURE: 106 MMHG | HEART RATE: 60 BPM

## 2022-11-10 DIAGNOSIS — Z98.84 STATUS POST BARIATRIC SURGERY: ICD-10-CM

## 2022-11-10 DIAGNOSIS — M81.0 OSTEOPOROSIS, UNSPECIFIED OSTEOPOROSIS TYPE, UNSPECIFIED PATHOLOGICAL FRACTURE PRESENCE: Primary | ICD-10-CM

## 2022-11-10 DIAGNOSIS — Z92.29 PERSONAL HISTORY OF DRUG THERAPY: ICD-10-CM

## 2022-11-10 PROCEDURE — 99214 OFFICE O/P EST MOD 30 MIN: CPT | Performed by: INTERNAL MEDICINE

## 2022-11-10 NOTE — NURSING NOTE
RECLAST:    09/09/22 08/20/21 08/07/20 08/01/19 07/20/18 06/23/17    Maya Funez [facility]  [phone]

## 2022-11-10 NOTE — PATIENT INSTRUCTIONS
[facility]  Dr Reyna, Endocrinology Department    [facility]   [address] [facility]  [place] [address]  Appointment [phone]  Fax: [phone]  [place]: Monday - Thursday      Please check the cost coverage and copay with insurance before recommended tests, services and medications (especially if new medications are prescribed).     If ordered, please get blood work done 1 week prior to your next appointment so they will be available to Dr. Reyna at your visit.    Plan to hold RECLAST in .  DEXA in 2 years ().  Follow up in 1 year.    The pt was advised to  Maintain an adequate calcium and vitamin D intake and to supplement vitamin D if needed to maintain serum levels of 25 hydroxy D (25 OH D) between 30-60 ng/ml.  Limit alcohol intake to no more than 2 servings per day.  Limit caffeine intake.  Maintain an active lifestyle including weight-bearing exercises for at least 30 mins daily.  Take measures to reduce the risk of falling.    You should get 1000- 1200 mg/day calcium in divided doses of no more than 500 mg/dose.  This INCLUDES what is in your food as well as what is in any supplements you may be taking.    Vit D about 800-1000 IU/day ( unless you have vit D deficiency- in that case higher dose)  Dietary sources of calcium:: These also contain vitamin D  Milk                            8 oz            300 mg calcium  Yogurt                          1 cup           400 mg calcium   Hard cheese                     1.5 oz          300 mg  Cottage cheese                  2 cup           300 mg  Orange juice with Calcium       8 oz            300 mg  Low fat dairy sources are recommended      You should get 30 minutes of moderate weight bearing exercise on most days of the week .  Weight bearing exercise includes such things as walking, jogging, hiking, dancing.  You should also get Strength training 2 or more times/week in addition to  other weight -being exercise. Strength training uses weight or resistance beyond that seen in everyday activities -(pilates, weight training with free weights, weight machines or resistance bands)    Living with Osteoporosis: Preventing Fractures  If you have osteoporosis, you can do a lot to reduce its effect on your life. Knowing how to prevent fractures and spinal curvature can help you live more comfortably and safely with this disease.  Reducing your risk for fractures  The most common fracture sites in people with osteoporosis are the wrist, spine, and hip. These fractures are often caused by accidents and falls. All fractures are painful and may limit what you can do. But hip fractures are very serious. They often need surgery, and it can take months to recover. To reduce your risk for fractures:  Get regular exercise. Try walking, swimming, or weight training.  Eat foods that are rich in calcium, or take calcium supplements.  Make your home safe to help avoid accidents.  Take extra precautions not to fall in risky areas, such as icy sidewalks.  Understanding spinal fractures  Your spine is made up of many bones called vertebrae. Osteoporosis can cause the vertebrae in your spine to collapse. As a result, your upper back may arch forward, creating a curvature. Spine fractures may also result from back strain and bad posture. You will also lose height. Your lower spine must then adjust to keep your body balanced. This can cause back pain. To prevent or lessen these spinal changes:  Practice good posture.  Use proper techniques if you need to lift heavy objects.  Do back exercises to help your posture.  Lie on your back when you have pain.  Ask your healthcare provider about these and other ways to help your spine.  [employer] last reviewed this educational content on 5/1/2018 2000-2021 [employer]. All rights reserved. This information is not intended as a substitute for professional medical  care. Always follow your healthcare professional's instructions.          Living with Osteoporosis: Regular Exercise  If you have osteoporosis, exercise is vital for your health. It can prevent bone fractures and spine changes. It will slow bone loss. Exercise will strengthen your body. It can also be fun. A variety of exercises is best. See below for exercises that can help you. But before you start, talk with your healthcare provider to be sure these exercises are right for you.   Resistance exercises. These build muscle strength and maintain bone mass. They also make you less prone to injury. Exercises include lifting small weights, doing push-ups and sit-ups, using elastic exercise bands, and using weight machines.   Weight-bearing activities. These help your whole body. They also help you maintain bone mass. Activities include walking, dancing, and housework.   Non-weight-bearing exercises. These help prevent back strain and pain. They do this by building the trunk and leg muscles. Exercises that help with flexibility can prevent falls. Examples include swimming, water exercise, and stretching.   Staying safe  Here are tips to stay safe:   Always check with your healthcare provider before starting any new exercise program.  Use weights only as instructed.  Stop any exercise that causes pain.  [employer] last reviewed this educational content on 5/1/2018 2000-2021 [employer]. All rights reserved. This information is not intended as a substitute for professional medical care. Always follow your healthcare professional's instructions.          Preventing Osteoporosis: Avoiding Bone Loss  Certain factors can speed up bone loss or decrease bone growth. For example, alcohol, cigarettes, and certain medicines reduce bone mass. Some foods make it hard for your body to absorb calcium.  Things to avoid  Here are things to avoid to help prevent osteoporosis:  Alcohol. This is toxic to bones. It is a major  cause of bone loss. Heavy drinking can cause osteoporosis even if you have no other risk factors.  Smoking. This reduces bone mass. Smoking may also interfere with estrogen levels and cause early menopause.  Inactivity. Not being active makes your bones lose strength and become thinner. Over time, thin bones may break. Women who aren't active are at a high risk for osteoporosis.  Certain medicines. Some medicines, such as cortisone, increase bone loss. They also decrease bone growth. Ask your healthcare provider about any side effects of your medicines, and how to prevent them.  Protein-rich or salty foods. Eaten in large amounts, these foods may deplete calcium.  Caffeine. This increases calcium loss. People who drink a lot of coffee, tea, or soda lose more calcium than those who don't.  [employer] last reviewed this educational content on 5/1/2018 2000-2021 [employer]. All rights reserved. This information is not intended as a substitute for professional medical care. Always follow your healthcare professional's instructions.          Preventing Osteoporosis: Meeting Your Calcium Needs  Your body needs calcium to build and repair bones. But it can't make calcium on its own. That's why it's important to eat calcium-rich foods. Some foods are naturally rich in calcium. Others have calcium added (fortified). It's best to get calcium from the foods you eat. But if you can't get enough, you may want to take calcium supplements. To meet your daily calcium needs, try the foods listed below.               Dairy Fish & beans Other sources   Source   Calcium (mg) per serving   Source   Calcium (mg) per serving   Source   Calcium (mg) per serving   Low-fat yogurt, plain   415 mg/8 oz.   Sardines, [place], canned, with bones   351 mg/3 oz.   Oatmeal, instant, fortified   215 mg/1 cup   Nonfat milk   302 mg/1 cup   [place], sockeye, canned, with bones   239 mg/3 oz.   Tofu made with calcium sulfate   204 mg/3  oz.   Low-fat milk   297 mg/1 cup   Soybeans, fresh, boiled   131 mg/1/2 cup   Collards   179 mg/1/2 cup   Swiss cheese   272 mg/1 oz.   White beans, cooked   81 mg/1/2 cup   English muffin, whole wheat   175 mg/1 muffin   Cheddar cheese   205 mg/1 oz.   Navy beans, cooked   79 mg/1/2 cup   Kale   90 mg/1/2 cup   Ice cream strawberry   79 mg/1/2 cup           Orange, navel   56 mg/1 medium   Note: Calcium levels may vary depending on brand and size.  Daily calcium needs  14 to 18 years old: 1,300 mg  19 to 30 years old: 1,000 mg  31 to 50 years old: 1,000 mg  51 to 70 years old, women: 1,200 mg  51 to 70 years old, men: 1,000 mg  Pregnant or nursin to 18 years old: 1,300 mg, 19 to 50 years old: 1,000 mg  Older than 70 (women and men): 1,200 mg   [employer] last reviewed this educational content on 2018-2021 [employer]. All rights reserved. This information is not intended as a substitute for professional medical care. Always follow your healthcare professional's instructions.

## 2022-11-10 NOTE — LETTER
11/10/2022         RE: Danika Hyman  [address] [place] [address]        Dear Colleague,    Thank you for referring your patient, Danika Hyman, to the [facility]. Please see a copy of my visit note below.    Name: Danika Hyman  Date:   Seen for f/u of osteoporosis.    HPI:  Danika Hyman is a 67 year old female who presents for the evaluation of osteoperosis.  Other past medical history includes history of gastric bypass surgery, esophageal reflux disease.    H/o osteopenia- in 50s.  DEXA 2017- osteoporosis  Following that she was seen at [facility] [place].  At that time given her history of GERD and gastric bypass surgery, intravenous therapy was considered. Never been on oral anti-reoprtive therapy.    Started Reclast-- June 2017.  RECLAST dates:     09/09/22 08/20/21 08/07/20 08/01/19 07/20/18 06/23/17       Tolerated well.  No major complaints.  Has started to go to gym and has started weight training.  No fractures or falls.  Plans to retire in 4/2023.    DEXA  2018- showing- suggestion of a possible trend towards improvement of the lumbar spine, and no significant change of the total hip.  DEXA 2020: There is the suggestion of a possible trend towards improvement of the lumbar spine, and no significant change of the total hip.  DEXA 7/2022: Osteopenia, No significant change in bone density of the lumbar spine or hip(s).      RISK FACTORS:  Post-menopausal, Parent history of osteoporosis, Fracture of foot, Condition related to bone loss: gastric bypass, Follow-up osteopenia    H/o Gastric bypass at age 26 years.  Lost 80 lbs at that time and was able to keep it off.  H/o bulimia before that.  On HRT- followed by OBGYN. Is on testosterone replacement as well as estradiol.  Has multiple questions about HRT.    Smoke:No  Family History: mother   Menstrual history/Birthing:   Fractures:in last 10 year- fracture metatarsal after a fall.  Kidney stones:  No  GI Surgery:h/o gastric bypass in past in   Duration of therapy: Reclast as noted above  Exercise: Does 41850 steps/day, does lifts weights.   Diet:   Milk: Minimal   Cheese: Minimal   Ca/Vitamin D: Calcium- 1500 mg/day , vit D 1800 IU/day.  Alcohol:  rare  Eating Disorder: Yes: h/o bulimia as teenager  Steroid Use:  No  PMH/PSH:  Past Medical History:   Diagnosis Date     Esophageal reflux      Osteoporosis 2017    DEXA:  lumbar -2.8, left hip -2.1, right hip -2.5     Status post bariatric surgery 1982    vertical banded gastrosplasty at [facility]     Past Surgical History:   Procedure Laterality Date     CATARACT IOL, RT/LT  2016     Z NONSPECIFIC PROCEDURE      wisdom teeth extraction,abstracted     [facility] NONSPECIFIC PROCEDURE      T&A,abstracted     Z NONSPECIFIC PROCEDURE      2 natural childbirths,abstracted     [facility] NONSPECIFIC PROCEDURE      gastric stapling ast [facility]     Family Hx:  Family History   Problem Relation Age of Onset     Arthritis Father         B:       Diabetes Father      Respiratory Father      Cardiovascular Father      Hypertension Mother      Alcohol/Drug Mother         B:192 alive     Lung Cancer Mother 89        lung cancer ;  age 91     Family History Negative Sister      Family History Negative Sister      Family History Negative Brother              Social Hx:  Social History     Socioeconomic History     Marital status:      Spouse name: Not on file     Number of children: Not on file     Years of education: Not on file     Highest education level: Not on file   Occupational History     Occupation:    Tobacco Use     Smoking status: Never     Smokeless tobacco: Never   Substance and Sexual Activity     Alcohol use: Yes     Comment: 4 glasses a week     Drug use: No     Sexual activity: Yes     Partners: Male   Other Topics Concern     Parent/sibling w/ CABG, MI or angioplasty before 65F 55M? Not Asked   Social History Narrative  "    Not on file     Social Determinants of Health     Financial Resource Strain: Low Risk      Difficulty of Paying Living Expenses: Not hard at all   Food Insecurity: No Food Insecurity     Worried About Running Out of Food in the Last Year: Never true     Ran Out of Food in the Last Year: Never true   Transportation Needs: No Transportation Needs     Lack of Transportation (Medical): No     Lack of Transportation (Non-Medical): No   Physical Activity: Sufficiently Active     Days of Exercise per Week: 7 days     Minutes of Exercise per Session: 40 min   Stress: No Stress Concern Present     Feeling of Stress : Only a little   Social Connections: Socially Integrated     Frequency of Communication with Friends and Family: More than three times a week     Frequency of Social Gatherings with Friends and Family: More than three times a week     Attends [employer] Services: 1 to 4 times per year     Active Member of Clubs or Organizations: Yes     Attends Club or Organization Meetings: Not on file     Marital Status:    Intimate Partner Violence: Not on file   Housing Stability: Low Risk      Unable to Pay for Housing in the Last Year: No     Number of Places Lived in the Last Year: 1     Unstable Housing in the Last Year: No          MEDICATIONS:  has a current medication list which includes the following prescription(s): calcium & phosphate w/ vit d & iron, multiple vitamins-minerals, estradiol, estradiol, famotidine, progesterone, testosterone, cyanocobalamin, and zoledronic acid.    ROS     ROS: 10 point ROS neg other than the symptoms noted above in the HPI.    Physical Exam   VS: /65 (BP Location: Left arm, Patient Position: Chair, Cuff Size: Adult Regular)   Pulse 60   Temp 97.6  F (36.4  C) (Tympanic)   Resp 16   Ht 1.626 m (5' 4.02\")   Wt 58.2 kg (128 lb 3.2 oz)   LMP 04/17/2009   SpO2 97%   Breastfeeding No   BMI 21.99 kg/m    GENERAL: healthy, alert and no distress  EYES: Eyes grossly " normal to inspection, conjunctivae and sclerae normal  ENT: no nose swelling, nasal discharge.  Thyroid: no apparent thyroid nodules  RESP: no audible wheeze, cough, or visible cyanosis.  No visible retractions or increased work of breathing.  Able to speak fully in complete sentences.  ABDO: not evaluated.  EXTREMITIES: no hand tremors.  NEURO: Cranial nerves grossly intact, mentation intact and speech normal  SKIN: No apparent skin lesions, rash or edema seen   PSYCH: mentation appears normal, affect normal/bright, judgement and insight intact, normal speech and appearance well-groomed    LABS:  TFTs:  TSH   Date Value Ref Range Status   06/20/2016 2.89 0.40 - 4.00 mU/L Final       PTH:   ENDO CALCIUM LABS-[facility] Latest Ref Rng & Units 3/19/2018   PARATHYROID HORMONE INTACT 18 - 80 pg/mL 41       Vitamin D:  Vitamin D Deficiency Screening Results:  Lab Results   Component Value Date    VITDT 40 11/02/2022    VITDT 38 01/05/2022    VITDT 41 07/29/2019    VITDT 55 03/19/2018    VITDT 57 06/20/2016       DEXA 5/2017:  IMPRESSION  Osteoporosis  Degenerative changes of the spine  Recommendations include ensuring adequate daily Calcium and Vitamin D intake  Follow up scan can be considered in three years.      DEXA 5/2018:  IMPRESSION  Osteopenia (low bone mass)  Degenerative changes of the spine  Recommendations include ensuring adequate daily Calcium and Vitamin D intake  Follow up scan can be considered in 3 years.      DEXA 6/2020:  FINDINGS:               Lumbar Spine (L1-L4)      T-score:  -2.0               Left Femoral Neck            T-score:  -1.5               Right Femoral Neck          T-score:  -1.9                             Lumbar (L1-L4) BMD: 0.945  Previous: 0.892                          Total Hip Mean BMD: 0.755  Previous: 0.750     Comparison is made to another DXA performed on the same [employer]  machine on 05/26/2018 and another in 2017.        IMPRESSION  Osteopenia (low bone  mass)  Degenerative changes of the spine  Recommendations include ensuring adequate daily Calcium and Vitamin D intake  Follow up scan can be considered in three years.     Comparisons are not necessarily valid when precision within the machine has not been determined. Such a comparison has been performed here; one should interpret with caution.   Compared to previous bone densitometry performed on this patient, there is the suggestion of a possible trend towards improvement of the lumbar spine, and no significant change of the total hip.    DEXA 7/2022:  IMPRESSION  Osteopenia., Degenerative changes of the lumbar spine which may falsely elevate results.     There has been no significant change in bone density of the lumbar spine. There has been no significant change in bone density of the hip(s).       Recommendations include ensuring adequate Calcium and Vitamin D.     Follow up can be considered in 3 years.   ___________________    All pertinent notes, labs, and images personally reviewed by me.   All pertinent notes, labs and reports done at outside clinic personally reviewed by me.      A/P  Ms.Nicole JOSETTE Hyman is a 67 year old here for the evaluation of:    #1 Osteoporosis:    Risk factors for low bone density include family history, , female, s/p gastric bypass, low BMI, Estrogen deficiency, low Ca intake.  DEXA 2017 consistent with osteoporosis  She received Reclast since 2017.  Completed Reclast X 6.  DEXA 2022-No significant change in bone density of the lumbar spine or hip(s).    Plan:  Discussed diagnosis, pathophysiology, management and treatment options of condition with pt.  Given history of gastric bypass as well as GERD she needs close monioting.  Plan to hold RECLAST in 2023.  DEXA in 2 years (2024).  Follow up in 1 year.    #2. H/o gastric bypass:  On calcium and  vit D replacement.  Recent labs in range.    #3. HRT:  On testosterone and estradiol  Followed by outside provider.  Not  discussed.    The pt was advised to    Maintain an adequate calcium and vitamin D intake and to supplement vitamin D if needed to maintain serum levels of 25 hydroxy D (25 OH D) between 30-60 ng/ml.    Limit alcohol intake to no more than 2 servings per day.    Limit caffeine intake.    Maintain an active lifestyle including weight-bearing exercises for at least 30 mins daily.    Take measures to reduce the risk of falling.    Instructions on RECLAT. Treatment with bisphosphonate therapy will decrease fracture risk 50-70%.   There is risk of osteonecrosis of the jaw in patients using bisphosphonates is approximately 1/1700-1/100,000, with development most likely related to invasive dental procedures. If an invasive dental procedure was necessary, preferably stop the bisphosphonate approximately 3 months prior to reduce the risk. Let your dentist know that you are on this medication.  The data available at this time suggests that there is probably a small increase risk of atypical (nontraumatic) subtrochanteric fractures of the femur in patients on bisphosphonate therapy compared to those not on it. One large study suggested that for every 100 fractures prevented with bisphosphonate therapy, less than one femur fracture will occur. Other studies suggest one episode per 2,500 patient years. Patient should call with leg pain.      Discussed indications, risks and benefits of all medications prescribed, and answered questions to patient's satisfaction.      More than 50% of the time spent with Ms. Hyman on counseling / coordinating her care.      Follow-up:  1 year    Fe Reyna MD  Endocrinology  [facility]  CC: Dexter Floyd    Addendum to above note and clinic visit:    Labs reviewed.    See result note/telephone encounter.          Answers for HPI/ROS submitted by the patient on 11/7/2022  General Symptoms: No  Skin Symptoms: No  HENT Symptoms: No  EYE SYMPTOMS: No  HEART SYMPTOMS:  No No  LUNG SYMPTOMS: No  INTESTINAL SYMPTOMS: No  URINARY SYMPTOMS: Yes  GYNECOLOGIC SYMPTOMS: Yes  BREAST SYMPTOMS: No  SKELETAL SYMPTOMS: Yes  BLOOD SYMPTOMS: No  NERVOUS SYSTEM SYMPTOMS: No  MENTAL HEALTH SYMPTOMS: No  Trouble holding urine or incontinence: No  Pain or burning: No  Trouble starting or stopping: No  Increased frequency of urination: No  Blood in urine: No  Decreased frequency of urination: No  Frequent nighttime urination: Yes  Flank pain: No  Difficulty emptying bladder: No  Bleeding or spotting between periods: No  Heavy or painful periods: No  Irregular periods: No  Vaginal discharge: No  Hot flashes: No  Vaginal dryness: Yes  Genital ulcers: No  Reduced libido: Yes  Painful intercourse: Yes  Difficulty with sexual arousal: Yes  Post-menopausal bleeding: No  Back pain: No  Muscle aches: Yes  Neck pain: No  Swollen joints: No  Joint pain: No  Bone pain: No  Muscle cramps: Yes  Muscle weakness: No  Joint stiffness: No  Bone fracture: No          Again, thank you for allowing me to participate in the care of your patient.        Sincerely,        Fe Reyna MD

## 2022-11-10 NOTE — PROGRESS NOTES
Name: Danika Hyman  Date:   Seen for f/u of osteoporosis.    HPI:  Danika Hyman is a 67 year old female who presents for the evaluation of osteoperosis.  Other past medical history includes history of gastric bypass surgery, esophageal reflux disease.    H/o osteopenia- in 50s.  DEXA 2017- osteoporosis  Following that she was seen at [facility].  At that time given her history of GERD and gastric bypass surgery, intravenous therapy was considered. Never been on oral anti-reoprtive therapy.    Started Reclast-- June 2017.  RECLAST dates:     09/09/22 08/20/21 08/07/20 08/01/19 07/20/18 06/23/17       Tolerated well.  No major complaints.  Has started to go to gym and has started weight training.  No fractures or falls.  Plans to retire in 4/2023.    DEXA  2018- showing- suggestion of a possible trend towards improvement of the lumbar spine, and no significant change of the total hip.  DEXA 2020: There is the suggestion of a possible trend towards improvement of the lumbar spine, and no significant change of the total hip.  DEXA 7/2022: Osteopenia, No significant change in bone density of the lumbar spine or hip(s).      RISK FACTORS:  Post-menopausal, Parent history of osteoporosis, Fracture of foot, Condition related to bone loss: gastric bypass, Follow-up osteopenia    H/o Gastric bypass at age 26 years.  Lost 80 lbs at that time and was able to keep it off.  H/o bulimia before that.  On HRT- followed by OBGYN. Is on testosterone replacement as well as estradiol.  Has multiple questions about HRT.    Smoke:No  Family History: mother   Menstrual history/Birthing:   Fractures:in last 10 year- fracture metatarsal after a fall.  Kidney stones: No  GI Surgery:h/o gastric bypass in past in 1982  Duration of therapy: Reclast as noted above  Exercise: Does 42680 steps/day, does lifts weights.   Diet:   Milk: Minimal   Cheese: Minimal   Ca/Vitamin D: Calcium- 1500 mg/day , vit D 1800  IU/day.  Alcohol:  rare  Eating Disorder: Yes: h/o bulimia as teenager  Steroid Use:  No  PMH/PSH:  Past Medical History:   Diagnosis Date     Esophageal reflux      Osteoporosis 2017    DEXA:  lumbar -2.8, left hip -2.1, right hip -2.5     Status post bariatric surgery 1982    vertical banded gastrosplasty at [facility]     Past Surgical History:   Procedure Laterality Date     CATARACT IOL, RT/LT  2016     Z NONSPECIFIC PROCEDURE      wisdom teeth extraction,abstracted     [facility] NONSPECIFIC PROCEDURE      T&A,abstracted     [facility] NONSPECIFIC PROCEDURE      2 natural childbirths,abstracted     [facility] NONSPECIFIC PROCEDURE  1982    gastric stapling ast [facility]     Family Hx:  Family History   Problem Relation Age of Onset     Arthritis Father         B:       Diabetes Father      Respiratory Father      Cardiovascular Father      Hypertension Mother      Alcohol/Drug Mother         B:192 alive     Lung Cancer Mother 89        lung cancer ;  age 91     Family History Negative Sister      Family History Negative Sister      Family History Negative Brother              Social Hx:  Social History     Socioeconomic History     Marital status:      Spouse name: Not on file     Number of children: Not on file     Years of education: Not on file     Highest education level: Not on file   Occupational History     Occupation:    Tobacco Use     Smoking status: Never     Smokeless tobacco: Never   Substance and Sexual Activity     Alcohol use: Yes     Comment: 4 glasses a week     Drug use: No     Sexual activity: Yes     Partners: Male   Other Topics Concern     Parent/sibling w/ CABG, MI or angioplasty before 65F 55M? Not Asked   Social History Narrative     Not on file     Social Determinants of Health     Financial Resource Strain: Low Risk      Difficulty of Paying Living Expenses: Not hard at all   Food Insecurity: No Food Insecurity     Worried About Running Out of Food in the Last Year:  "Never true     Ran Out of Food in the Last Year: Never true   Transportation Needs: No Transportation Needs     Lack of Transportation (Medical): No     Lack of Transportation (Non-Medical): No   Physical Activity: Sufficiently Active     Days of Exercise per Week: 7 days     Minutes of Exercise per Session: 40 min   Stress: No Stress Concern Present     Feeling of Stress : Only a little   Social Connections: Socially Integrated     Frequency of Communication with Friends and Family: More than three times a week     Frequency of Social Gatherings with Friends and Family: More than three times a week     Attends Druze Services: 1 to 4 times per year     Active Member of Clubs or Organizations: Yes     Attends Club or Organization Meetings: Not on file     Marital Status:    Intimate Partner Violence: Not on file   Housing Stability: Low Risk      Unable to Pay for Housing in the Last Year: No     Number of Places Lived in the Last Year: 1     Unstable Housing in the Last Year: No          MEDICATIONS:  has a current medication list which includes the following prescription(s): calcium & phosphate w/ vit d & iron, multiple vitamins-minerals, estradiol, estradiol, famotidine, progesterone, testosterone, cyanocobalamin, and zoledronic acid.    ROS     ROS: 10 point ROS neg other than the symptoms noted above in the HPI.    Physical Exam   VS: /65 (BP Location: Left arm, Patient Position: Chair, Cuff Size: Adult Regular)   Pulse 60   Temp 97.6  F (36.4  C) (Tympanic)   Resp 16   Ht 1.626 m (5' 4.02\")   Wt 58.2 kg (128 lb 3.2 oz)   LMP 04/17/2009   SpO2 97%   Breastfeeding No   BMI 21.99 kg/m    GENERAL: healthy, alert and no distress  EYES: Eyes grossly normal to inspection, conjunctivae and sclerae normal  ENT: no nose swelling, nasal discharge.  Thyroid: no apparent thyroid nodules  RESP: no audible wheeze, cough, or visible cyanosis.  No visible retractions or increased work of breathing.  " Able to speak fully in complete sentences.  ABDO: not evaluated.  EXTREMITIES: no hand tremors.  NEURO: Cranial nerves grossly intact, mentation intact and speech normal  SKIN: No apparent skin lesions, rash or edema seen   PSYCH: mentation appears normal, affect normal/bright, judgement and insight intact, normal speech and appearance well-groomed    LABS:  TFTs:  TSH   Date Value Ref Range Status   06/20/2016 2.89 0.40 - 4.00 mU/L Final       PTH:   ENDO CALCIUM LABS-[facility] Latest Ref Rng & Units 3/19/2018   PARATHYROID HORMONE INTACT 18 - 80 pg/mL 41       Vitamin D:  Vitamin D Deficiency Screening Results:  Lab Results   Component Value Date    VITDT 40 11/02/2022    VITDT 38 01/05/2022    VITDT 41 07/29/2019    VITDT 55 03/19/2018    VITDT 57 06/20/2016       DEXA 5/2017:  IMPRESSION  Osteoporosis  Degenerative changes of the spine  Recommendations include ensuring adequate daily Calcium and Vitamin D intake  Follow up scan can be considered in three years.      DEXA 5/2018:  IMPRESSION  Osteopenia (low bone mass)  Degenerative changes of the spine  Recommendations include ensuring adequate daily Calcium and Vitamin D intake  Follow up scan can be considered in 3 years.      DEXA 6/2020:  FINDINGS:               Lumbar Spine (L1-L4)      T-score:  -2.0               Left Femoral Neck            T-score:  -1.5               Right Femoral Neck          T-score:  -1.9                             Lumbar (L1-L4) BMD: 0.945  Previous: 0.892                          Total Hip Mean BMD: 0.755  Previous: 0.750     Comparison is made to another DXA performed on the same [employer]  machine on 05/26/2018 and another in 2017.        IMPRESSION  Osteopenia (low bone mass)  Degenerative changes of the spine  Recommendations include ensuring adequate daily Calcium and Vitamin D intake  Follow up scan can be considered in three years.     Comparisons are not necessarily valid when precision within the machine has not been  determined. Such a comparison has been performed here; one should interpret with caution.   Compared to previous bone densitometry performed on this patient, there is the suggestion of a possible trend towards improvement of the lumbar spine, and no significant change of the total hip.    DEXA 7/2022:  IMPRESSION  Osteopenia., Degenerative changes of the lumbar spine which may falsely elevate results.     There has been no significant change in bone density of the lumbar spine. There has been no significant change in bone density of the hip(s).       Recommendations include ensuring adequate Calcium and Vitamin D.     Follow up can be considered in 3 years.   ___________________    All pertinent notes, labs, and images personally reviewed by me.   All pertinent notes, labs and reports done at outside clinic personally reviewed by me.      A/P  Ms.Nicole JOSETTE Hyman is a 67 year old here for the evaluation of:    #1 Osteoporosis:    Risk factors for low bone density include family history, , female, s/p gastric bypass, low BMI, Estrogen deficiency, low Ca intake.  DEXA 2017 consistent with osteoporosis  She received Reclast since 2017.  Completed Reclast X 6.  DEXA 2022-No significant change in bone density of the lumbar spine or hip(s).    Plan:  Discussed diagnosis, pathophysiology, management and treatment options of condition with pt.  Given history of gastric bypass as well as GERD she needs close monioting.  Plan to hold RECLAST in 2023.  DEXA in 2 years (2024).  Follow up in 1 year.    #2. H/o gastric bypass:  On calcium and  vit D replacement.  Recent labs in range.    #3. HRT:  On testosterone and estradiol  Followed by outside provider.  Not discussed.    The pt was advised to    Maintain an adequate calcium and vitamin D intake and to supplement vitamin D if needed to maintain serum levels of 25 hydroxy D (25 OH D) between 30-60 ng/ml.    Limit alcohol intake to no more than 2 servings per day.    Limit  caffeine intake.    Maintain an active lifestyle including weight-bearing exercises for at least 30 mins daily.    Take measures to reduce the risk of falling.    Instructions on RECLAT. Treatment with bisphosphonate therapy will decrease fracture risk 50-70%.   There is risk of osteonecrosis of the jaw in patients using bisphosphonates is approximately 1/1700-1/100,000, with development most likely related to invasive dental procedures. If an invasive dental procedure was necessary, preferably stop the bisphosphonate approximately 3 months prior to reduce the risk. Let your dentist know that you are on this medication.  The data available at this time suggests that there is probably a small increase risk of atypical (nontraumatic) subtrochanteric fractures of the femur in patients on bisphosphonate therapy compared to those not on it. One large study suggested that for every 100 fractures prevented with bisphosphonate therapy, less than one femur fracture will occur. Other studies suggest one episode per 2,500 patient years. Patient should call with leg pain.      Discussed indications, risks and benefits of all medications prescribed, and answered questions to patient's satisfaction.      More than 50% of the time spent with Ms. Hyman on counseling / coordinating her care.      Follow-up:  1 year    Fe Reyna MD  Endocrinology  [facility]  CC: Dexter Floyd    Addendum to above note and clinic visit:    Labs reviewed.    See result note/telephone encounter.          Answers for HPI/ROS submitted by the patient on 11/7/2022  General Symptoms: No  Skin Symptoms: No  HENT Symptoms: No  EYE SYMPTOMS: No  HEART SYMPTOMS: No  LUNG SYMPTOMS: No  INTESTINAL SYMPTOMS: No  URINARY SYMPTOMS: Yes  GYNECOLOGIC SYMPTOMS: Yes  BREAST SYMPTOMS: No  SKELETAL SYMPTOMS: Yes  BLOOD SYMPTOMS: No  NERVOUS SYSTEM SYMPTOMS: No  MENTAL HEALTH SYMPTOMS: No  Trouble holding urine or incontinence: No  Pain or  burning: No  Trouble starting or stopping: No  Increased frequency of urination: No  Blood in urine: No  Decreased frequency of urination: No  Frequent nighttime urination: Yes  Flank pain: No  Difficulty emptying bladder: No  Bleeding or spotting between periods: No  Heavy or painful periods: No  Irregular periods: No  Vaginal discharge: No  Hot flashes: No  Vaginal dryness: Yes  Genital ulcers: No  Reduced libido: Yes  Painful intercourse: Yes  Difficulty with sexual arousal: Yes  Post-menopausal bleeding: No  Back pain: No  Muscle aches: Yes  Neck pain: No  Swollen joints: No  Joint pain: No  Bone pain: No  Muscle cramps: Yes  Muscle weakness: No  Joint stiffness: No  Bone fracture: No

## 2022-12-21 ENCOUNTER — VIRTUAL VISIT (OUTPATIENT)
Dept: OBGYN | Facility: CLINIC | Age: 67
End: 2022-12-21
Payer: COMMERCIAL

## 2022-12-21 DIAGNOSIS — N95.2 VAGINAL ATROPHY: ICD-10-CM

## 2022-12-21 PROCEDURE — 99214 OFFICE O/P EST MOD 30 MIN: CPT | Mod: 95 | Performed by: OBSTETRICS & GYNECOLOGY

## 2022-12-21 RX ORDER — ESTRADIOL 0.1 MG/G
CREAM VAGINAL
Qty: 42.5 G | Refills: 5 | Status: SHIPPED | OUTPATIENT
Start: 2022-12-21 | End: 2023-07-19

## 2022-12-21 NOTE — PROGRESS NOTES
Danika Hyman is a 67 year old female who is being evaluated via a billable telephone visit.      What phone number would you like to be contacted at? [phone]  How would you like to obtain your AVS? Rasheed Hyman is a 67 year old female who presents for virtual visit today for the following health issue(s):  Patient presents with:  Consult: Patient would like to discuss discontinuing use of topical hormone cream. Is having no problems with the medicine, just would like to know if this something she should continue at her age.       Additional information:     Today Danika reports starting HRT with Dr. Jacobson for hot flashes in her early 50s. She does feel the HRT helped with hot flashes. She thinks it has also been beneficial for skin and hair. She likes being on her hormones but is wondering if they are safe and healthy. She has osteoporosis and has been taking Reclast and lifting weights. Now downgraded to osteopenia. She is overall wondering if HRT is safe/healthy and necessary for her. She has no complaints, but is wondering if it is all necessary.     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 She will then change health insurance and is worried about coverage for her meds.    We reviewed HRT indications, risk, benefits, and alternatives at length. We discussed the indication for vasomotor symptoms, and that it is not considered effective for primary prevention of bone loss or cardiovascular health. We also reviewed my concern that her regimen is not providing adequate endometrial protection as transdermal absorption of progesterone has not been proven. To this end, I have advised endometrial biopsy to evaluate her endometrial health, and have also proposed using a mass produced estrogen/progestin patch if she chooses to proceed with HRT, as opposed to compounded options. We also reviewed that the limited safety data available on use of testosterone in women includes  only women on concomitant estrogen therapy. As she was already debating the need for HRT, I have recommended that she stop her topical estrogen, progesterone, and testosterone and monitor symptoms over the next 3 weeks. At that time, we will meet in clinic for an endometrial biopsy and follow up regarding vasomotor symptoms as well. She agrees to this plan.   I have advised that she continue with her vaginal estrogen for vaginal atrophy as related symptoms are not anticipated to wane with distance from menopause as typically vasomotor symptoms do. Danika was in agreement with this plan.         Duration of telephone call: 14 min  32 minutes spent on the date of the encounter doing chart review, review of test results, patient visit and documentation

## 2023-02-02 ENCOUNTER — OFFICE VISIT (OUTPATIENT)
Dept: OBGYN | Facility: CLINIC | Age: 68
End: 2023-02-02
Payer: COMMERCIAL

## 2023-02-02 VITALS — DIASTOLIC BLOOD PRESSURE: 64 MMHG | WEIGHT: 131 LBS | SYSTOLIC BLOOD PRESSURE: 102 MMHG | BODY MASS INDEX: 22.47 KG/M2

## 2023-02-02 DIAGNOSIS — Z12.4 SCREENING FOR CERVICAL CANCER: ICD-10-CM

## 2023-02-02 DIAGNOSIS — Z91.89 AT RISK FOR SIDE EFFECT OF MEDICATION: Primary | ICD-10-CM

## 2023-02-02 PROCEDURE — 88305 TISSUE EXAM BY PATHOLOGIST: CPT | Performed by: STUDENT IN AN ORGANIZED HEALTH CARE EDUCATION/TRAINING PROGRAM

## 2023-02-02 PROCEDURE — G0145 SCR C/V CYTO,THINLAYER,RESCR: HCPCS | Performed by: OBSTETRICS & GYNECOLOGY

## 2023-02-02 PROCEDURE — 58100 BIOPSY OF UTERUS LINING: CPT | Performed by: OBSTETRICS & GYNECOLOGY

## 2023-02-02 PROCEDURE — 87624 HPV HI-RISK TYP POOLED RSLT: CPT | Performed by: OBSTETRICS & GYNECOLOGY

## 2023-02-02 NOTE — NURSING NOTE
"Chief Complaint   Patient presents with     Minor Procedure     Patient is here for an EMB. Patient has been on  topical HRT for several years, and recently stopped using the topical medication.        Initial /64   Wt 59.4 kg (131 lb)   LMP 2009   BMI 22.47 kg/m   Estimated body mass index is 22.47 kg/m  as calculated from the following:    Height as of 11/10/22: 1.626 m (5' 4.02\").    Weight as of this encounter: 59.4 kg (131 lb).  BP completed using cuff size: regular    Questioned patient about current smoking habits.  Pt. has never smoked.          The following HM Due: NONE    Mercy Lee CMA               "

## 2023-02-02 NOTE — PROGRESS NOTES
SUBJECTIVE:   CC: endometrial biopsy                                                Danika Hyman is a 67 year old  female who presents to clinic today for endometrial biopsy in the setting of inadequate progesterone coverage in the setting of topical estrogen use. She is now off of her hormones and not experiencing and vasomotor symptoms. She agrees with plan to proceed with endometrial sampling to rule out concern for hyperplasia or malignancy.     Problem list and histories reviewed & adjusted, as indicated.  Additional history: as documented.       CALCIUM PLUS TABS   OR, 2 tabs q day  CENTRUM SILVER OR, 1 tablet daily  Collagen-Vitamin C-Biotin (COLLAGEN 1500/C PO),   estradiol (ESTRACE) 0.1 MG/GM vaginal cream, PLACE 1 GRAM VAGINALLY 2 TIMES EVERY WEEK  famotidine (PEPCID) 20 MG tablet, Take 1 tablet (20 mg) by mouth 2 times daily  VITAMIN B-12 500 MCG OR TABS, 1 sublingual qday    No current facility-administered medications on file prior to visit.    Allergies   Allergen Reactions     Codeine      Vomiting       OBJECTIVE:   /64   Wt 59.4 kg (131 lb)   LMP 2009   BMI 22.47 kg/m     Const: sitting in chair in no acute distress, comfortable  Pulm: no increased work of breathing, no cough  Psych: mood stable, appropriate affect  Neuro: A+Ox3   : External genitalia normal well-estrogenized, healthy tissue.  No obvious excoriations, lesions, or rashes. Bartholins, urethra, normal.  Normal moist pink vaginal mucosa.  SSE: Normal cervix, normal physiologic discharge.   Bimanual: No CMT, anteverted uterus with smooth contour, mobile. No adnexal masses or tenderness appreciated.     Procedure:      Endometrial biopsy:  Consent was obtained. A speculum was inserted into the vagina and the cervix visualized. The cervix was swabbed with betadine x3. An endometrial biopsy pipelle was then introduced through the cervical os into the uterus, the uterus sounded to 7cm. The plunger was then deployed  with good suction. The pipelle was rotated to collect a thorough sample. Sample was moderate. The cervix was found to be hemostatic and the speculum was removed. The patient tolerated the procedure without difficulty.        ASSESSMENT/PLAN:                                                    Danika Hyman is a 67 year old female  here for endometrial biopsy. Tolerated the procedure well.     1. Screening for cervical cancer  - Pap screen with HPV - recommended age 30 - 65 years    2. At risk for side effect of medication  - Surgical Pathology Exam   - will contact with path results when available.     Tessa Crowe MD  Obstetrics and Gynecology   [facility] [place]

## 2023-02-06 LAB
BKR LAB AP GYN ADEQUACY: NORMAL
BKR LAB AP GYN INTERPRETATION: NORMAL
BKR LAB AP HPV REFLEX: NORMAL
BKR LAB AP PREVIOUS ABNORMAL: NORMAL
PATH REPORT.COMMENTS IMP SPEC: NORMAL
PATH REPORT.FINAL DX SPEC: NORMAL
PATH REPORT.GROSS SPEC: NORMAL
PATH REPORT.MICROSCOPIC SPEC OTHER STN: NORMAL
PATH REPORT.RELEVANT HX SPEC: NORMAL
PATH REPORT.RELEVANT HX SPEC: NORMAL
PHOTO IMAGE: NORMAL

## 2023-02-07 LAB
HUMAN PAPILLOMA VIRUS 16 DNA: NEGATIVE
HUMAN PAPILLOMA VIRUS 18 DNA: NEGATIVE
HUMAN PAPILLOMA VIRUS FINAL DIAGNOSIS: NORMAL
HUMAN PAPILLOMA VIRUS OTHER HR: NEGATIVE

## 2023-02-16 ENCOUNTER — ANCILLARY PROCEDURE (OUTPATIENT)
Dept: ULTRASOUND IMAGING | Facility: CLINIC | Age: 68
End: 2023-02-16
Attending: OBSTETRICS & GYNECOLOGY
Payer: COMMERCIAL

## 2023-02-16 DIAGNOSIS — Z91.89 AT RISK FOR SIDE EFFECT OF MEDICATION: ICD-10-CM

## 2023-02-16 PROCEDURE — 76830 TRANSVAGINAL US NON-OB: CPT | Performed by: OBSTETRICS & GYNECOLOGY

## 2023-02-16 PROCEDURE — 76856 US EXAM PELVIC COMPLETE: CPT | Performed by: OBSTETRICS & GYNECOLOGY

## 2023-04-01 ENCOUNTER — HEALTH MAINTENANCE LETTER (OUTPATIENT)
Age: 68
End: 2023-04-01

## 2023-04-07 SDOH — ECONOMIC STABILITY: FOOD INSECURITY: WITHIN THE PAST 12 MONTHS, YOU WORRIED THAT YOUR FOOD WOULD RUN OUT BEFORE YOU GOT MONEY TO BUY MORE.: NEVER TRUE

## 2023-04-07 SDOH — ECONOMIC STABILITY: FOOD INSECURITY: WITHIN THE PAST 12 MONTHS, THE FOOD YOU BOUGHT JUST DIDN'T LAST AND YOU DIDN'T HAVE MONEY TO GET MORE.: NEVER TRUE

## 2023-04-07 SDOH — ECONOMIC STABILITY: INCOME INSECURITY: HOW HARD IS IT FOR YOU TO PAY FOR THE VERY BASICS LIKE FOOD, HOUSING, MEDICAL CARE, AND HEATING?: NOT HARD AT ALL

## 2023-04-07 SDOH — HEALTH STABILITY: PHYSICAL HEALTH: ON AVERAGE, HOW MANY MINUTES DO YOU ENGAGE IN EXERCISE AT THIS LEVEL?: 50 MIN

## 2023-04-07 SDOH — ECONOMIC STABILITY: INCOME INSECURITY: IN THE LAST 12 MONTHS, WAS THERE A TIME WHEN YOU WERE NOT ABLE TO PAY THE MORTGAGE OR RENT ON TIME?: NO

## 2023-04-07 SDOH — HEALTH STABILITY: PHYSICAL HEALTH: ON AVERAGE, HOW MANY DAYS PER WEEK DO YOU ENGAGE IN MODERATE TO STRENUOUS EXERCISE (LIKE A BRISK WALK)?: 6 DAYS

## 2023-04-07 ASSESSMENT — ENCOUNTER SYMPTOMS: BREAST MASS: 0

## 2023-04-07 ASSESSMENT — LIFESTYLE VARIABLES
AUDIT-C TOTAL SCORE: 2
HOW OFTEN DO YOU HAVE A DRINK CONTAINING ALCOHOL: 2-4 TIMES A MONTH
SKIP TO QUESTIONS 9-10: 1
HOW MANY STANDARD DRINKS CONTAINING ALCOHOL DO YOU HAVE ON A TYPICAL DAY: 1 OR 2
HOW OFTEN DO YOU HAVE SIX OR MORE DRINKS ON ONE OCCASION: NEVER

## 2023-04-07 ASSESSMENT — SOCIAL DETERMINANTS OF HEALTH (SDOH)
HOW OFTEN DO YOU ATTEND CHURCH OR RELIGIOUS SERVICES?: 1 TO 4 TIMES PER YEAR
DO YOU BELONG TO ANY CLUBS OR ORGANIZATIONS SUCH AS CHURCH GROUPS UNIONS, FRATERNAL OR ATHLETIC GROUPS, OR SCHOOL GROUPS?: YES
HOW OFTEN DO YOU GET TOGETHER WITH FRIENDS OR RELATIVES?: TWICE A WEEK

## 2023-04-07 ASSESSMENT — ACTIVITIES OF DAILY LIVING (ADL): CURRENT_FUNCTION: NO ASSISTANCE NEEDED

## 2023-04-14 ENCOUNTER — OFFICE VISIT (OUTPATIENT)
Dept: PEDIATRICS | Facility: CLINIC | Age: 68
End: 2023-04-14
Payer: COMMERCIAL

## 2023-04-14 VITALS
SYSTOLIC BLOOD PRESSURE: 90 MMHG | OXYGEN SATURATION: 97 % | DIASTOLIC BLOOD PRESSURE: 56 MMHG | BODY MASS INDEX: 22.84 KG/M2 | HEIGHT: 64 IN | RESPIRATION RATE: 18 BRPM | WEIGHT: 133.8 LBS | HEART RATE: 63 BPM | TEMPERATURE: 96.8 F

## 2023-04-14 DIAGNOSIS — Z12.31 ENCOUNTER FOR SCREENING MAMMOGRAM FOR BREAST CANCER: ICD-10-CM

## 2023-04-14 DIAGNOSIS — Z00.00 ENCOUNTER FOR MEDICARE ANNUAL WELLNESS EXAM: Primary | ICD-10-CM

## 2023-04-14 DIAGNOSIS — Z82.49 FAMILY HISTORY OF ASCVD (ARTERIOSCLEROTIC CARDIOVASCULAR DISEASE): ICD-10-CM

## 2023-04-14 LAB
CHOLEST SERPL-MCNC: 151 MG/DL
FASTING STATUS PATIENT QL REPORTED: YES
GLUCOSE SERPL-MCNC: 82 MG/DL (ref 70–99)
HDLC SERPL-MCNC: 91 MG/DL
LDLC SERPL CALC-MCNC: 51 MG/DL
NONHDLC SERPL-MCNC: 60 MG/DL
TRIGL SERPL-MCNC: 43 MG/DL

## 2023-04-14 PROCEDURE — G0439 PPPS, SUBSEQ VISIT: HCPCS | Performed by: NURSE PRACTITIONER

## 2023-04-14 PROCEDURE — 80061 LIPID PANEL: CPT | Performed by: NURSE PRACTITIONER

## 2023-04-14 PROCEDURE — 36415 COLL VENOUS BLD VENIPUNCTURE: CPT | Performed by: NURSE PRACTITIONER

## 2023-04-14 PROCEDURE — 82947 ASSAY GLUCOSE BLOOD QUANT: CPT | Performed by: NURSE PRACTITIONER

## 2023-04-14 ASSESSMENT — ACTIVITIES OF DAILY LIVING (ADL): CURRENT_FUNCTION: NO ASSISTANCE NEEDED

## 2023-04-14 ASSESSMENT — ENCOUNTER SYMPTOMS: BREAST MASS: 0

## 2023-04-14 ASSESSMENT — PAIN SCALES - GENERAL: PAINLEVEL: NO PAIN (0)

## 2023-04-14 NOTE — PROGRESS NOTES
"SUBJECTIVE:   Danika is a 68 year old who presents for Preventive Visit.       View : No data to display.            Patient has been advised of split billing requirements and indicates understanding: Yes     Are you in the first 12 months of your Medicare coverage?  No    Healthy Habits:     In general, how would you rate your overall health?  Good    Frequency of exercise:  4-5 days/week    Duration of exercise:  45-60 minutes    Do you usually eat at least 4 servings of fruit and vegetables a day, include whole grains    & fiber and avoid regularly eating high fat or \"junk\" foods?  Yes    Taking medications regularly:  Yes    Medication side effects:  None    Ability to successfully perform activities of daily living:  No assistance needed    Home Safety:  Lack of grab bars in the bathroom and no safety concerns identified    Hearing Impairment:  Difficulty following a conversation in a noisy restaurant or crowded room, feel that people are mumbling or not speaking clearly and need to ask people to speak up or repeat themselves    In the past 6 months, have you been bothered by leaking of urine?  No    In general, how would you rate your overall mental or emotional health?  Good      PHQ-2 Total Score: 0    Additional concerns today:  Yes      Have you ever done Advance Care Planning? (For example, a Health Directive, POLST, or a discussion with a medical provider or your loved ones about your wishes): Yes, advance care planning is on file.     Fall risk  Fallen 2 or more times in the past year?: No  Any fall with injury in the past year?: No    Cognitive Screening   1) Repeat 3 items (Leader, Season, Table)    2) Clock draw: NORMAL  3) 3 item recall: Recalls 3 objects  Results: 3 items recalled: COGNITIVE IMPAIRMENT LESS LIKELY    Mini-CogTM Copyright ELENI Correa. Licensed by the author for use in [facility]; reprinted with permission ([email or web address]@.[email or web address]). All rights reserved.      Do you have sleep apnea, " excessive snoring or daytime drowsiness?: no    Reviewed and updated as needed this visit by clinical staff   Tobacco  Allergies  Meds              Reviewed and updated as needed this visit by Provider                 Social History     Tobacco Use     Smoking status: Never     Smokeless tobacco: Never   Vaping Use     Vaping status: Not on file   Substance Use Topics     Alcohol use: Yes     Comment: 4 glasses a week           4/7/2023    10:14 AM   Alcohol Use   Prescreen: >3 drinks/day or >7 drinks/week? No     Do you have a current opioid prescription? No  Do you use any other controlled substances or medications that are not prescribed by a provider? None    Current providers sharing in care for this patient include:   Patient Care Team:  Dexter Floyd MD as PCP - General (Internal Medicine)  Dexter Floyd MD as Assigned PCP  Fe Reyna MD as Assigned Endocrinology Provider  Tessa Crowe MD as Assigned OBGYN Provider    The following health maintenance items are reviewed in Epic and correct as of today:  Health Maintenance   Topic Date Due     ANNUAL REVIEW OF HM ORDERS  Never done     MAMMO SCREENING  07/06/2023     MEDICARE ANNUAL WELLNESS VISIT  04/14/2024     FALL RISK ASSESSMENT  04/14/2024     DTAP/TDAP/TD IMMUNIZATION (2 - Td or Tdap) 06/20/2026     LIPID  01/05/2027     COLORECTAL CANCER SCREENING  07/31/2027     ADVANCE CARE PLANNING  04/14/2028     DEXA  07/06/2037     HEPATITIS C SCREENING  Completed     PHQ-2 (once per calendar year)  Completed     INFLUENZA VACCINE  Completed     Pneumococcal Vaccine: 65+ Years  Completed     ZOSTER IMMUNIZATION  Completed     COVID-19 Vaccine  Completed     IPV IMMUNIZATION  Aged Out     MENINGITIS IMMUNIZATION  Aged Out     PAP  Discontinued     BP Readings from Last 3 Encounters:   04/14/23 90/56   02/02/23 102/64   11/10/22 106/65    Wt Readings from Last 3 Encounters:   04/14/23 60.7 kg (133 lb 12.8 oz)   02/02/23 59.4 kg (131  "lb)   11/10/22 58.2 kg (128 lb 3.2 oz)               1/1/2022    12:49 PM   Breast CA Risk Assessment (FHS-7)   Do you have a family history of breast, colon, or ovarian cancer? No / Unknown     Mammogram Screening: Recommended mammography every 1-2 years with patient discussion and risk factor consideration  Pertinent mammograms are reviewed under the imaging tab.    Review of Systems   Breasts:  Negative for tenderness, breast mass and discharge.   Genitourinary: Negative for pelvic pain, vaginal bleeding and vaginal discharge.     OBJECTIVE:   BP 90/56 (BP Location: Right arm, Patient Position: Sitting, Cuff Size: Adult Regular)   Pulse 63   Temp 96.8  F (36  C) (Tympanic)   Resp 18   Ht 1.62 m (5' 3.78\")   Wt 60.7 kg (133 lb 12.8 oz)   LMP 04/17/2009   SpO2 97%   BMI 23.13 kg/m   Estimated body mass index is 23.13 kg/m  as calculated from the following:    Height as of this encounter: 1.62 m (5' 3.78\").    Weight as of this encounter: 60.7 kg (133 lb 12.8 oz).  Physical Exam  Constitutional: appears to be in no acute distress, comfortable, pleasant.   Eyes: anicteric, conjunctiva clear without drainage or erythema. AMANDA.   Ears, Nose and Throat: tympanic membranes gray with LR,  nose without nasal discharge. OP: no erythema to posterior pharynx, negative post nasal drainage, tonsils +1 no erythema or exudate.  Neck: supple, thyroid palpable,not enlarged, no nodules   Breast: Exam deferred (deferred after discussion of exam options with patient, no symptoms or concerns).   Cardiovascular: regular rate and rhythm, normal S1 and S2, no murmurs, rubs or gallops, peripheral pulses full and symmetric; negative peripheral edema   Respiratory: Air entry throughout. Breathing pattern unlabored without the use of accessory muscles. Clear to auscultation A and P, no wheezes or crackles, normal breath sounds.    Gastrointestinal: rounded, soft. Positive bowel sounds x4, nontender, no masses.   Genitourinary: Exam " deferred (deferred after discussion of exam options with patient, no symptoms or concerns, pap up to date).   Musculoskeletal: full range of motion, no edema.   Skin: pink, turgor smooth and elastic. Negative for lesions or dryness.  Neurological: normal gait, no tremor.   Psychological: appropriate mood and affect.   Lymphatic: no cervical, axillary, supraclavicular, or infraclavicular lymphadenopathy.    Diagnostic Test Results:  Labs reviewed in Epic    ASSESSMENT / PLAN:   (Z00.00) Encounter for Medicare annual wellness exam  (primary encounter diagnosis)  Age appropriate screening and preventative care have been addressed today. Vaccinations have been reviewed and are up to date. Patient has been advised to follow a balanced diet with adequate calcium and vitamin D. They have been advised to undertake routine aerobic activity. Colonoscopy has been reviewed and is up to date at this time. Recommend annual vision exams as well as biannual dental exams. They will follow up for annual physical again in one year.   - Lipid panel reflex to direct LDL Fasting  - Glucose  - Pap - utd  - Mammo - due  - DEXA - utd  - Colonoscopy utd    (Z12.31) Encounter for screening mammogram for breast cancer  - MA Screen Bilateral w/Alexander         (Z82.49) Family history of ASCVD (arteriosclerotic cardiovascular disease)  Sister was recently found to have ASCVD, pt reports their cholesterol levels have always been similar. She is concerned she may also have ASCVD. FLP has always been normal in the past. Recheck pending. Offered CT coronary calcium scan, which she is interested in pursuing even though we may not necessarily recommend starting a statin given her excellent lipid panel.   - CT Coronary Calcium Scan           Follow-up: Lab results pending, will follow-up as indicated after reviewing results.       COUNSELING:  Reviewed preventive health counseling, as reflected in patient instructions  Special attention given to:        Regular exercise       Healthy diet/nutrition       Vision screening       Dental care       Immunizations       Osteoporosis prevention/bone health       Colon cancer screening    She reports that she has never smoked. She has never used smokeless tobacco.      Appropriate preventive services were discussed with this patient, including applicable screening as appropriate for cardiovascular disease, diabetes, osteopenia/osteoporosis, and glaucoma.  As appropriate for age/gender, discussed screening for colorectal cancer, prostate cancer, breast cancer, and cervical cancer. Checklist reviewing preventive services available has been given to the patient.    Reviewed patients plan of care and provided an AVS. The Basic Care Plan (routine screening as documented in Health Maintenance) for Danika meets the Care Plan requirement. This Care Plan has been established and reviewed with the Patient.          [facility]NARDO [facility]    Identified Health Risks:    I have reviewed Opioid Use Disorder and Substance Use Disorder risk factors and made any needed referrals.

## 2023-04-24 ENCOUNTER — HOSPITAL ENCOUNTER (OUTPATIENT)
Dept: CT IMAGING | Facility: CLINIC | Age: 68
Discharge: HOME | End: 2023-04-24
Attending: NURSE PRACTITIONER | Admitting: NURSE PRACTITIONER
Payer: COMMERCIAL

## 2023-04-24 DIAGNOSIS — Z82.49 FAMILY HISTORY OF ASCVD (ARTERIOSCLEROTIC CARDIOVASCULAR DISEASE): ICD-10-CM

## 2023-04-24 PROCEDURE — 75571 CT HRT W/O DYE W/CA TEST: CPT

## 2023-04-24 PROCEDURE — 75571 CT HRT W/O DYE W/CA TEST: CPT | Mod: 26 | Performed by: INTERNAL MEDICINE

## 2023-05-15 DIAGNOSIS — J47.9 BRONCHIECTASIS WITHOUT COMPLICATION (H): Primary | ICD-10-CM

## 2023-05-15 DIAGNOSIS — J98.4 PULMONARY SCARRING: ICD-10-CM

## 2023-05-16 DIAGNOSIS — J98.4 PULMONARY SCARRING: ICD-10-CM

## 2023-05-16 DIAGNOSIS — J47.9 BRONCHIECTASIS WITHOUT COMPLICATION (H): Primary | ICD-10-CM

## 2023-07-16 DIAGNOSIS — N95.2 VAGINAL ATROPHY: ICD-10-CM

## 2023-07-18 ENCOUNTER — ANCILLARY PROCEDURE (OUTPATIENT)
Dept: MAMMOGRAPHY | Facility: CLINIC | Age: 68
End: 2023-07-18
Attending: NURSE PRACTITIONER
Payer: MEDICARE

## 2023-07-18 PROCEDURE — 77067 SCR MAMMO BI INCL CAD: CPT | Mod: TC | Performed by: RADIOLOGY

## 2023-07-18 PROCEDURE — 77063 BREAST TOMOSYNTHESIS BI: CPT | Mod: TC | Performed by: RADIOLOGY

## 2023-07-19 RX ORDER — ESTRADIOL 0.1 MG/G
CREAM VAGINAL
Qty: 42.5 G | Refills: 5 | Status: SHIPPED | OUTPATIENT
Start: 2023-07-19 | End: 2024-04-25

## 2023-07-19 NOTE — TELEPHONE ENCOUNTER
Prescription approved per [facility] Refill Protocol.    Jon Larsen RN on 7/19/2023 at 3:20 PM

## 2023-08-21 ENCOUNTER — ALLIED HEALTH/NURSE VISIT (OUTPATIENT)
Dept: PULMONOLOGY | Facility: CLINIC | Age: 68
End: 2023-08-21
Payer: MEDICARE

## 2023-08-21 ENCOUNTER — OFFICE VISIT (OUTPATIENT)
Dept: PULMONOLOGY | Facility: CLINIC | Age: 68
End: 2023-08-21
Attending: NURSE PRACTITIONER
Payer: MEDICARE

## 2023-08-21 VITALS
BODY MASS INDEX: 22.71 KG/M2 | DIASTOLIC BLOOD PRESSURE: 63 MMHG | WEIGHT: 133 LBS | SYSTOLIC BLOOD PRESSURE: 102 MMHG | HEART RATE: 56 BPM | OXYGEN SATURATION: 97 % | HEIGHT: 64 IN

## 2023-08-21 DIAGNOSIS — J98.4 PULMONARY SCARRING: ICD-10-CM

## 2023-08-21 DIAGNOSIS — K21.9 GASTROESOPHAGEAL REFLUX DISEASE WITHOUT ESOPHAGITIS: ICD-10-CM

## 2023-08-21 DIAGNOSIS — J47.9 BRONCHIECTASIS WITHOUT COMPLICATION (H): Primary | ICD-10-CM

## 2023-08-21 DIAGNOSIS — J47.9 BRONCHIECTASIS WITHOUT COMPLICATION (H): ICD-10-CM

## 2023-08-21 LAB
DLCOUNC-%PRED-PRE: 123 %
DLCOUNC-PRE: 23.75 ML/MIN/MMHG
DLCOUNC-PRED: 19.17 ML/MIN/MMHG
ERV-%PRED-PRE: 115 %
ERV-PRE: 0.87 L
ERV-PRED: 0.76 L
EXPTIME-PRE: 5.42 SEC
FEF2575-%PRED-PRE: 143 %
FEF2575-PRE: 2.74 L/SEC
FEF2575-PRED: 1.91 L/SEC
FEFMAX-%PRED-PRE: 114 %
FEFMAX-PRE: 6.75 L/SEC
FEFMAX-PRED: 5.9 L/SEC
FEV1-%PRED-PRE: 116 %
FEV1-PRE: 2.51 L
FEV1FEV6-PRE: 84 %
FEV1FEV6-PRED: 80 %
FEV1FVC-PRE: 84 %
FEV1FVC-PRED: 79 %
FEV1SVC-PRE: 84 %
FEV1SVC-PRED: 68 %
FIFMAX-PRE: 6.76 L/SEC
FRCPLETH-%PRED-PRE: 125 %
FRCPLETH-PRE: 3.42 L
FRCPLETH-PRED: 2.73 L
FVC-%PRED-PRE: 110 %
FVC-PRE: 3 L
FVC-PRED: 2.72 L
IC-%PRED-PRE: 91 %
IC-PRE: 2.13 L
IC-PRED: 2.32 L
RVPLETH-%PRED-PRE: 138 %
RVPLETH-PRE: 2.55 L
RVPLETH-PRED: 1.84 L
TLCPLETH-%PRED-PRE: 111 %
TLCPLETH-PRE: 5.55 L
TLCPLETH-PRED: 5 L
VA-%PRED-PRE: 99 %
VA-PRE: 4.55 L
VC-%PRED-PRE: 95 %
VC-PRE: 3 L
VC-PRED: 3.15 L

## 2023-08-21 PROCEDURE — 94729 DIFFUSING CAPACITY: CPT | Performed by: INTERNAL MEDICINE

## 2023-08-21 PROCEDURE — 99204 OFFICE O/P NEW MOD 45 MIN: CPT | Mod: 25 | Performed by: INTERNAL MEDICINE

## 2023-08-21 PROCEDURE — 94375 RESPIRATORY FLOW VOLUME LOOP: CPT | Performed by: INTERNAL MEDICINE

## 2023-08-21 PROCEDURE — 94726 PLETHYSMOGRAPHY LUNG VOLUMES: CPT | Performed by: INTERNAL MEDICINE

## 2023-08-21 ASSESSMENT — PAIN SCALES - GENERAL: PAINLEVEL: NO PAIN (0)

## 2023-08-21 NOTE — LETTER
2023         RE: Danika Hyman  [address] [place] [address]        Dear Colleague,    Thank you for referring your patient, Danika Hyman, to the [facility] [place]. Please see a copy of my visit note below.    Pulmonary Clinic New Patient Consult  Reason for Consult: bronchiectasis  History of Present Illness    Ms. Danika Hyman is a 68 yof with a history of acid reflux who presents to pulmonary clinic today for further evaluation of bronchiectasis seen on cardiac CT.    She reports:  -No known history of lung disease.  -When was a younger child up to middle age would get a bad pneumonia annually. This seems to have abated once she started getting flu shots consistently (late ).  -Sometimes has a sensation that she can't get a full breath in but has been learning how to breathe differently with yoga and this has helped.  -No shortness of breath at rest or with exertion.  -Walks 1 hour in the woods daily.  No functional limitations related to shortness of breath.  -No cough, sputum production, hemoptysis  -No seasonal allergies or hayfever symptoms  -Does have acid reflux.  Takes Pepcid daily with good control.  Notes triggers of acidic foods, alcohol and spicy foods.  -Not taking any inhalers.    -Never smoker.  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  -No pets at home.  -No significant exposure to birds, down feathers, no jacuzzi, no recent travel outside the area - was in [place] and [place] in .    Family history notable for kids with allergies.  Son has asthma.  Mother  of lung cancer at age 91 but has a significant smoking history.        Review of Systems:  10 of 14 systems reviewed and are negative unless otherwise stated in HPI.    Past Medical History:   Diagnosis Date     Esophageal reflux      Osteoporosis 2017    DEXA:  lumbar -2.8, left hip -2.1, right hip -2.5     Status post bariatric surgery     vertical banded  "gastrosplasty at [facility]       Allergies   Allergen Reactions     Morphine And Related      Vomiting         Current Outpatient Medications:      CALCIUM PLUS TABS   OR, 2 tabs q day, Disp: , Rfl:      CENTRUM SILVER OR, 1 tablet daily, Disp: , Rfl:      Collagen-Vitamin C-Biotin (COLLAGEN 1500/C PO), , Disp: , Rfl:      estradiol (ESTRACE) 0.1 MG/GM vaginal cream, PLACE 1 GRAM VAGINALLY 2 TIMES EVERY WEEK, Disp: 42.5 g, Rfl: 5     famotidine (PEPCID) 20 MG tablet, Take 1 tablet (20 mg) by mouth 2 times daily, Disp: 60 tablet, Rfl: 0     VITAMIN B-12 500 MCG OR TABS, 1 sublingual qday, Disp: 30, Rfl: 5      Physical Exam:  /63   Pulse 56   Ht 1.62 m (5' 3.78\")   Wt 60.3 kg (133 lb)   LMP 04/17/2009   SpO2 97%   BMI 22.99 kg/m    GENERAL: Well developed, well nourished, alert, and in no apparent distress.  HEENT: Normocephalic, atraumatic. PERRL, EOMI. Oral mucosa is moist. No perioral cyanosis.  NECK: supple, no obvious masses.  RESP:  Normal respiratory effort.  CTAB.  No rales, wheezes, rhonchi.  No cyanosis or clubbing.  CV: Normal S1, S2, regular rhythm, normal rate. No murmur.  No LE edema.   ABDOMEN: non-distended.   SKIN: warm and dry. No rash.  NEURO: AAOx3.  Normal gait.  Fluent speech.  PSYCH: mentation appears normal.     Results (personally reviewed in clinic today):  PFTs: Ratio 84%, %, FEV1 116%, %, %, DLCO 123%.  Study demonstrates normal pulmonary mechanics and normal gas exchange.  Imaging:  CT Calcium screening 4/24:  1. Focus of endobronchial mucous plugging with associated bronchiectasis and scarring in the right middle lobe.  2. Please see separate report for the cardiology portion of the study.      Assessment and Plan:   Danika Hyman is a 68 year old female with a history of GERD who presents to clinic for further evaluation of bronchiectasis.  Bronchiectasis. Normal PFTs.  Small focus of bronchiectasis in the RML which is quite mild.  She does not describe any " chronic respiratory symptoms.  Suspect this focus of bronchiectasis could be related to scarring from previous episodes of pneumonia or acid reflux.  Given focal nature and very mild findings, very low suspicion at this time for idiopathic bronchiectasis, ABPA or HERNAN.  Given absence of chronic respiratory symptoms, there is no indication for airway clearance measures.  Suggest repeating CT chest in 6 months to demonstrate stability and continue follow symptoms.  Ms. Hyman is agreeable to this plan.  GERD.  Continue Famotidine as previously prescribed.  Lifestyle modifications to reduce acid reflux were reviewed.    Questions and concerns were answered to the patient's satisfaction.  she was provided with my contact information should new questions or concerns arise in the interim.  she should return to clinic in 6 months with repeat CT.  May see me or Lauren.  Up to date on PCV 20 (2022).   Seasonal flu vaccine recommended when available.    Lorene Hunter MD  Pulmonary and Critical Care Medicine    The above note was dictated using voice recognition software and may include typographical errors. Please contact the author for any clarifications.  I spent 45 minutes on the date of encounter doing chart review, review of outside records, review of test results, conducting the patient visit, completing documentation and further activities as noted above.                                      Again, thank you for allowing me to participate in the care of your patient.        Sincerely,        Gena Hunter MD

## 2023-08-21 NOTE — PATIENT INSTRUCTIONS
-Return to clinic about 6 months with repeat CT scan for follow up  -No need for inhalers or to do anything differently right now

## 2023-08-21 NOTE — PROGRESS NOTES
Pulmonary Clinic New Patient Consult  Reason for Consult: bronchiectasis  History of Present Illness    Ms. Danika Hyman is a 68 yof with a history of acid reflux who presents to pulmonary clinic today for further evaluation of bronchiectasis seen on cardiac CT.    She reports:  -No known history of lung disease.  -When was a younger child up to middle age would get a bad pneumonia annually. This seems to have abated once she started getting flu shots consistently (late ).  -Sometimes has a sensation that she can't get a full breath in but has been learning how to breathe differently with yoga and this has helped.  -No shortness of breath at rest or with exertion.  -Walks 1 hour in the woods daily.  No functional limitations related to shortness of breath.  -No cough, sputum production, hemoptysis  -No seasonal allergies or hayfever symptoms  -Does have acid reflux.  Takes Pepcid daily with good control.  Notes triggers of acidic foods, alcohol and spicy foods.  -Not taking any inhalers.    -Never smoker.  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  -No pets at home.  -No significant exposure to birds, down feathers, no jacuzzi, no recent travel outside the area - was in [place] and [place] in .    Family history notable for kids with allergies.  Son has asthma.  Mother  of lung cancer at age 91 but has a significant smoking history.        Review of Systems:  10 of 14 systems reviewed and are negative unless otherwise stated in HPI.    Past Medical History:   Diagnosis Date    Esophageal reflux     Osteoporosis 2017    DEXA:  lumbar -2.8, left hip -2.1, right hip -2.5    Status post bariatric surgery 1982    vertical banded gastrosplasty at [facility]       Allergies   Allergen Reactions    Morphine And Related      Vomiting         Current Outpatient Medications:     CALCIUM PLUS TABS   OR, 2 tabs q day, Disp: , Rfl:     CENTRUM SILVER OR, 1 tablet daily, Disp: , Rfl:      "Collagen-Vitamin C-Biotin (COLLAGEN 1500/C PO), , Disp: , Rfl:     estradiol (ESTRACE) 0.1 MG/GM vaginal cream, PLACE 1 GRAM VAGINALLY 2 TIMES EVERY WEEK, Disp: 42.5 g, Rfl: 5    famotidine (PEPCID) 20 MG tablet, Take 1 tablet (20 mg) by mouth 2 times daily, Disp: 60 tablet, Rfl: 0    VITAMIN B-12 500 MCG OR TABS, 1 sublingual qday, Disp: 30, Rfl: 5      Physical Exam:  /63   Pulse 56   Ht 1.62 m (5' 3.78\")   Wt 60.3 kg (133 lb)   LMP 04/17/2009   SpO2 97%   BMI 22.99 kg/m    GENERAL: Well developed, well nourished, alert, and in no apparent distress.  HEENT: Normocephalic, atraumatic. PERRL, EOMI. Oral mucosa is moist. No perioral cyanosis.  NECK: supple, no obvious masses.  RESP:  Normal respiratory effort.  CTAB.  No rales, wheezes, rhonchi.  No cyanosis or clubbing.  CV: Normal S1, S2, regular rhythm, normal rate. No murmur.  No LE edema.   ABDOMEN: non-distended.   SKIN: warm and dry. No rash.  NEURO: AAOx3.  Normal gait.  Fluent speech.  PSYCH: mentation appears normal.     Results (personally reviewed in clinic today):  PFTs: Ratio 84%, %, FEV1 116%, %, %, DLCO 123%.  Study demonstrates normal pulmonary mechanics and normal gas exchange.  Imaging:  CT Calcium screening 4/24:  1. Focus of endobronchial mucous plugging with associated bronchiectasis and scarring in the right middle lobe.  2. Please see separate report for the cardiology portion of the study.      Assessment and Plan:   Danika Hyman is a 68 year old female with a history of GERD who presents to clinic for further evaluation of bronchiectasis.  Bronchiectasis. Normal PFTs.  Small focus of bronchiectasis in the RML which is quite mild.  She does not describe any chronic respiratory symptoms.  Suspect this focus of bronchiectasis could be related to scarring from previous episodes of pneumonia or acid reflux.  Given focal nature and very mild findings, very low suspicion at this time for idiopathic bronchiectasis, " ABPA or HERNAN.  Given absence of chronic respiratory symptoms, there is no indication for airway clearance measures.  Suggest repeating CT chest in 6 months to demonstrate stability and continue follow symptoms.  Ms. Hyman is agreeable to this plan.  GERD.  Continue Famotidine as previously prescribed.  Lifestyle modifications to reduce acid reflux were reviewed.    Questions and concerns were answered to the patient's satisfaction.  she was provided with my contact information should new questions or concerns arise in the interim.  she should return to clinic in 6 months with repeat CT.  May see me or Lauren.  Up to date on PCV 20 (2022).   Seasonal flu vaccine recommended when available.    Lorene Hunter MD  Pulmonary and Critical Care Medicine    The above note was dictated using voice recognition software and may include typographical errors. Please contact the author for any clarifications.  I spent 45 minutes on the date of encounter doing chart review, review of outside records, review of test results, conducting the patient visit, completing documentation and further activities as noted above.

## 2023-08-31 NOTE — PROGRESS NOTES
Danika Hyman comes into clinic today at the request of Dr. Hunter, Ordering Provider for PFT    This service provided today was under the supervising provider of the day Dr. Hunter, who was available if needed.    Wilver Bates, RT   
DISCHARGE

## 2023-10-23 ENCOUNTER — TRANSFERRED RECORDS (OUTPATIENT)
Dept: HEALTH INFORMATION MANAGEMENT | Facility: CLINIC | Age: 68
End: 2023-10-23
Payer: MEDICARE

## 2023-11-20 ENCOUNTER — HOSPITAL ENCOUNTER (INPATIENT)
Facility: CLINIC | Age: 68
LOS: 1 days | Discharge: HOME | DRG: 149 | End: 2023-11-22
Attending: EMERGENCY MEDICINE | Admitting: HOSPITALIST
Payer: MEDICARE

## 2023-11-20 ENCOUNTER — APPOINTMENT (OUTPATIENT)
Dept: MRI IMAGING | Facility: CLINIC | Age: 68
DRG: 149 | End: 2023-11-20
Attending: EMERGENCY MEDICINE
Payer: MEDICARE

## 2023-11-20 DIAGNOSIS — R42 DIZZINESS: ICD-10-CM

## 2023-11-20 DIAGNOSIS — R42 VERTIGO: ICD-10-CM

## 2023-11-20 LAB
ANION GAP SERPL CALCULATED.3IONS-SCNC: 16 MMOL/L (ref 7–15)
BASOPHILS # BLD AUTO: 0.1 10E3/UL (ref 0–0.2)
BASOPHILS NFR BLD AUTO: 1 %
BUN SERPL-MCNC: 19.9 MG/DL (ref 8–23)
CALCIUM SERPL-MCNC: 9 MG/DL (ref 8.8–10.2)
CHLORIDE SERPL-SCNC: 99 MMOL/L (ref 98–107)
CREAT SERPL-MCNC: 0.57 MG/DL (ref 0.51–0.95)
DEPRECATED HCO3 PLAS-SCNC: 23 MMOL/L (ref 22–29)
EGFRCR SERPLBLD CKD-EPI 2021: >90 ML/MIN/1.73M2
EOSINOPHIL # BLD AUTO: 0.1 10E3/UL (ref 0–0.7)
EOSINOPHIL NFR BLD AUTO: 1 %
ERYTHROCYTE [DISTWIDTH] IN BLOOD BY AUTOMATED COUNT: 13.2 % (ref 10–15)
GLUCOSE SERPL-MCNC: 138 MG/DL (ref 70–99)
HCT VFR BLD AUTO: 39.8 % (ref 35–47)
HGB BLD-MCNC: 13.2 G/DL (ref 11.7–15.7)
IMM GRANULOCYTES # BLD: 0 10E3/UL
IMM GRANULOCYTES NFR BLD: 0 %
LYMPHOCYTES # BLD AUTO: 1.5 10E3/UL (ref 0.8–5.3)
LYMPHOCYTES NFR BLD AUTO: 15 %
MAGNESIUM SERPL-MCNC: 1.7 MG/DL (ref 1.7–2.3)
MCH RBC QN AUTO: 31.1 PG (ref 26.5–33)
MCHC RBC AUTO-ENTMCNC: 33.2 G/DL (ref 31.5–36.5)
MCV RBC AUTO: 94 FL (ref 78–100)
MONOCYTES # BLD AUTO: 0.5 10E3/UL (ref 0–1.3)
MONOCYTES NFR BLD AUTO: 5 %
NEUTROPHILS # BLD AUTO: 8 10E3/UL (ref 1.6–8.3)
NEUTROPHILS NFR BLD AUTO: 78 %
NRBC # BLD AUTO: 0 10E3/UL
NRBC BLD AUTO-RTO: 0 /100
PLATELET # BLD AUTO: 263 10E3/UL (ref 150–450)
POTASSIUM SERPL-SCNC: 3.5 MMOL/L (ref 3.4–5.3)
RBC # BLD AUTO: 4.25 10E6/UL (ref 3.8–5.2)
SODIUM SERPL-SCNC: 138 MMOL/L (ref 135–145)
TROPONIN T SERPL HS-MCNC: <6 NG/L
WBC # BLD AUTO: 10.1 10E3/UL (ref 4–11)

## 2023-11-20 PROCEDURE — 250N000013 HC RX MED GY IP 250 OP 250 PS 637: Performed by: EMERGENCY MEDICINE

## 2023-11-20 PROCEDURE — 70553 MRI BRAIN STEM W/O & W/DYE: CPT | Mod: MA

## 2023-11-20 PROCEDURE — 96374 THER/PROPH/DIAG INJ IV PUSH: CPT

## 2023-11-20 PROCEDURE — 250N000011 HC RX IP 250 OP 636: Mod: JZ | Performed by: EMERGENCY MEDICINE

## 2023-11-20 PROCEDURE — G0378 HOSPITAL OBSERVATION PER HR: HCPCS

## 2023-11-20 PROCEDURE — 99285 EMERGENCY DEPT VISIT HI MDM: CPT | Mod: 25

## 2023-11-20 PROCEDURE — 250N000011 HC RX IP 250 OP 636: Performed by: EMERGENCY MEDICINE

## 2023-11-20 PROCEDURE — 36415 COLL VENOUS BLD VENIPUNCTURE: CPT | Performed by: EMERGENCY MEDICINE

## 2023-11-20 PROCEDURE — 96375 TX/PRO/DX INJ NEW DRUG ADDON: CPT

## 2023-11-20 PROCEDURE — 99222 1ST HOSP IP/OBS MODERATE 55: CPT | Mod: AI | Performed by: HOSPITALIST

## 2023-11-20 PROCEDURE — 93005 ELECTROCARDIOGRAM TRACING: CPT

## 2023-11-20 PROCEDURE — 85025 COMPLETE CBC W/AUTO DIFF WBC: CPT | Performed by: EMERGENCY MEDICINE

## 2023-11-20 PROCEDURE — A9585 GADOBUTROL INJECTION: HCPCS | Performed by: EMERGENCY MEDICINE

## 2023-11-20 PROCEDURE — 83735 ASSAY OF MAGNESIUM: CPT | Performed by: EMERGENCY MEDICINE

## 2023-11-20 PROCEDURE — 258N000003 HC RX IP 258 OP 636: Performed by: EMERGENCY MEDICINE

## 2023-11-20 PROCEDURE — 84484 ASSAY OF TROPONIN QUANT: CPT | Performed by: EMERGENCY MEDICINE

## 2023-11-20 PROCEDURE — 96361 HYDRATE IV INFUSION ADD-ON: CPT

## 2023-11-20 PROCEDURE — 255N000002 HC RX 255 OP 636: Performed by: EMERGENCY MEDICINE

## 2023-11-20 PROCEDURE — 82310 ASSAY OF CALCIUM: CPT | Performed by: EMERGENCY MEDICINE

## 2023-11-20 RX ORDER — ONDANSETRON 2 MG/ML
4 INJECTION INTRAMUSCULAR; INTRAVENOUS EVERY 30 MIN PRN
Status: DISCONTINUED | OUTPATIENT
Start: 2023-11-20 | End: 2023-11-22 | Stop reason: HOSPADM

## 2023-11-20 RX ORDER — ACETAMINOPHEN 325 MG/1
650 TABLET ORAL EVERY 4 HOURS PRN
Status: DISCONTINUED | OUTPATIENT
Start: 2023-11-20 | End: 2023-11-22 | Stop reason: HOSPADM

## 2023-11-20 RX ORDER — ONDANSETRON 2 MG/ML
4 INJECTION INTRAMUSCULAR; INTRAVENOUS EVERY 6 HOURS PRN
Status: DISCONTINUED | OUTPATIENT
Start: 2023-11-20 | End: 2023-11-22 | Stop reason: HOSPADM

## 2023-11-20 RX ORDER — AMOXICILLIN 250 MG
1 CAPSULE ORAL 2 TIMES DAILY PRN
Status: DISCONTINUED | OUTPATIENT
Start: 2023-11-20 | End: 2023-11-22 | Stop reason: HOSPADM

## 2023-11-20 RX ORDER — ONDANSETRON 4 MG/1
4 TABLET, ORALLY DISINTEGRATING ORAL EVERY 6 HOURS PRN
Status: DISCONTINUED | OUTPATIENT
Start: 2023-11-20 | End: 2023-11-22 | Stop reason: HOSPADM

## 2023-11-20 RX ORDER — MULTIVITAMIN,THERAPEUTIC
1 TABLET ORAL DAILY
Status: DISCONTINUED | OUTPATIENT
Start: 2023-11-21 | End: 2023-11-22 | Stop reason: HOSPADM

## 2023-11-20 RX ORDER — ACETAMINOPHEN 650 MG/1
650 SUPPOSITORY RECTAL EVERY 4 HOURS PRN
Status: DISCONTINUED | OUTPATIENT
Start: 2023-11-20 | End: 2023-11-22 | Stop reason: HOSPADM

## 2023-11-20 RX ORDER — AMOXICILLIN 250 MG
2 CAPSULE ORAL 2 TIMES DAILY PRN
Status: DISCONTINUED | OUTPATIENT
Start: 2023-11-20 | End: 2023-11-22 | Stop reason: HOSPADM

## 2023-11-20 RX ORDER — MECLIZINE HYDROCHLORIDE 25 MG/1
25 TABLET ORAL 3 TIMES DAILY PRN
Status: DISCONTINUED | OUTPATIENT
Start: 2023-11-20 | End: 2023-11-22 | Stop reason: HOSPADM

## 2023-11-20 RX ORDER — PROCHLORPERAZINE MALEATE 5 MG
5 TABLET ORAL EVERY 6 HOURS PRN
Status: DISCONTINUED | OUTPATIENT
Start: 2023-11-20 | End: 2023-11-22 | Stop reason: HOSPADM

## 2023-11-20 RX ORDER — DIAZEPAM 10 MG/2ML
2 INJECTION, SOLUTION INTRAMUSCULAR; INTRAVENOUS ONCE
Status: COMPLETED | OUTPATIENT
Start: 2023-11-20 | End: 2023-11-20

## 2023-11-20 RX ORDER — MECLIZINE HYDROCHLORIDE 25 MG/1
25 TABLET ORAL ONCE
Status: COMPLETED | OUTPATIENT
Start: 2023-11-20 | End: 2023-11-20

## 2023-11-20 RX ORDER — GADOBUTROL 604.72 MG/ML
6 INJECTION INTRAVENOUS ONCE
Status: COMPLETED | OUTPATIENT
Start: 2023-11-20 | End: 2023-11-20

## 2023-11-20 RX ORDER — PROCHLORPERAZINE 25 MG
12.5 SUPPOSITORY, RECTAL RECTAL EVERY 12 HOURS PRN
Status: DISCONTINUED | OUTPATIENT
Start: 2023-11-20 | End: 2023-11-22 | Stop reason: HOSPADM

## 2023-11-20 RX ORDER — FAMOTIDINE 20 MG/1
20 TABLET, FILM COATED ORAL 2 TIMES DAILY
Status: DISCONTINUED | OUTPATIENT
Start: 2023-11-20 | End: 2023-11-22 | Stop reason: HOSPADM

## 2023-11-20 RX ORDER — UREA 10 %
500 LOTION (ML) TOPICAL DAILY
Status: DISCONTINUED | OUTPATIENT
Start: 2023-11-21 | End: 2023-11-22 | Stop reason: HOSPADM

## 2023-11-20 RX ADMIN — MECLIZINE HYDROCHLORIDE 25 MG: 25 TABLET ORAL at 21:23

## 2023-11-20 RX ADMIN — ONDANSETRON 4 MG: 2 INJECTION INTRAMUSCULAR; INTRAVENOUS at 13:53

## 2023-11-20 RX ADMIN — GADOBUTROL 6 ML: 604.72 INJECTION INTRAVENOUS at 16:09

## 2023-11-20 RX ADMIN — DIAZEPAM 2 MG: 10 INJECTION, SOLUTION INTRAMUSCULAR; INTRAVENOUS at 21:22

## 2023-11-20 RX ADMIN — SODIUM CHLORIDE 1000 ML: 9 INJECTION, SOLUTION INTRAVENOUS at 21:22

## 2023-11-20 ASSESSMENT — ACTIVITIES OF DAILY LIVING (ADL)
CHANGE_IN_FUNCTIONAL_STATUS_SINCE_ONSET_OF_CURRENT_ILLNESS/INJURY: NO
WEAR_GLASSES_OR_BLIND: YES
ADLS_ACUITY_SCORE: 35
ADLS_ACUITY_SCORE: 35
DIFFICULTY_EATING/SWALLOWING: NO
VISION_MANAGEMENT: READING
TOILETING_ISSUES: NO
DIFFICULTY_COMMUNICATING: NO
ADLS_ACUITY_SCORE: 35
CONCENTRATING,_REMEMBERING_OR_MAKING_DECISIONS_DIFFICULTY: NO
ADLS_ACUITY_SCORE: 35
WALKING_OR_CLIMBING_STAIRS_DIFFICULTY: NO
DOING_ERRANDS_INDEPENDENTLY_DIFFICULTY: NO
HEARING_DIFFICULTY_OR_DEAF: NO
ADLS_ACUITY_SCORE: 35
DRESSING/BATHING_DIFFICULTY: NO
FALL_HISTORY_WITHIN_LAST_SIX_MONTHS: NO

## 2023-11-20 NOTE — ED NOTES
"Tele-PIT/Intake Evaluation      Video-Visit Details    Type of service:  Video Visit    Video Start Time (time video started): 1:41 PM  Video End Time (time video stopped): 1:46 PM   Originating Location (pt. Location): [facility]  Distant Location (provider location):  [facility]  Mode of Communication:  Video Conference via [place]  Patient verbally consented to orderTalk televisit.    History:  Danika Hyman is a 68 year old female who comes with complaint of dizziness after doing pilates. She was washing her hands after class and then suddenly developed dizziness that she describes as both lightheadedness and room spinning and thought she would pass out.  She sat down hoping it would pass.  She felt nauseated like she would vomiting. She was sweaty. FD came and took vitals which were ok. EMTs came and transported her, did an EKG in the rig and \"ruled out heart attack and stroke.\" Nausea and dizziness continued. Every time she moves her head she gets nauseated and dizzy. No numbness, weakness, problems with speech or vision. No history of vertigo. No history of stroke. No history of high blood pressure or diabetes. No neck pain right now but did have some earlier today. +current congestion.    Exam:  Patient Vitals for the past 24 hrs:   BP Temp Temp src Pulse Resp SpO2 Height Weight   11/20/23 1109 116/58 98.7  F (37.1  C) Temporal 62 20 100 % 1.626 m (5' 4\") 61.2 kg (135 lb)       Appropriate interventions for symptom management were initiated if applicable.  Appropriate diagnostic tests were initiated if indicated.    Important information for subsequent clinician:  Likely peripheral vertigo but will order bloodwork, EKG and MRI to rule out central vertigo.     I briefly evaluated the patient and developed an initial plan of care. I discussed this plan and explained that this brief interaction does not constitute a full evaluation. Patient/family understands that " they should wait to be fully evaluated and discuss any test results with another clinician prior to leaving the hospital.       Renée Reagan MD  11/20/23 [id number]

## 2023-11-20 NOTE — ED TRIAGE NOTES
Presents to the ED via ambulance following a dizzy episode after working out. EMS reports vital signs within normal limits. 4mg of zofran given en route.

## 2023-11-20 NOTE — ED TRIAGE NOTES
Pt c/o dizziness while doing pilates. C/o nausea and had some dry heaves. Given zofran by EMS. Dizziness is starting to resolve and nausea is better after zofran.      Triage Assessment (Adult)       Row Name 11/20/23 1110          Triage Assessment    Airway WDL WDL        Respiratory WDL    Respiratory WDL WDL        Skin Circulation/Temperature WDL    Skin Circulation/Temperature WDL WDL        Cardiac WDL    Cardiac WDL WDL        Peripheral/Neurovascular WDL    Peripheral Neurovascular WDL WDL        Cognitive/Neuro/Behavioral WDL    Cognitive/Neuro/Behavioral WDL WDL

## 2023-11-21 ENCOUNTER — APPOINTMENT (OUTPATIENT)
Dept: PHYSICAL THERAPY | Facility: CLINIC | Age: 68
DRG: 149 | End: 2023-11-21
Attending: HOSPITALIST
Payer: MEDICARE

## 2023-11-21 LAB
ANION GAP SERPL CALCULATED.3IONS-SCNC: 9 MMOL/L (ref 7–15)
ATRIAL RATE - MUSE: 60 BPM
BUN SERPL-MCNC: 11.9 MG/DL (ref 8–23)
CALCIUM SERPL-MCNC: 8.2 MG/DL (ref 8.8–10.2)
CHLORIDE SERPL-SCNC: 106 MMOL/L (ref 98–107)
CREAT SERPL-MCNC: 0.61 MG/DL (ref 0.51–0.95)
DEPRECATED HCO3 PLAS-SCNC: 25 MMOL/L (ref 22–29)
DIASTOLIC BLOOD PRESSURE - MUSE: NORMAL MMHG
EGFRCR SERPLBLD CKD-EPI 2021: >90 ML/MIN/1.73M2
ERYTHROCYTE [DISTWIDTH] IN BLOOD BY AUTOMATED COUNT: 13.2 % (ref 10–15)
GLUCOSE SERPL-MCNC: 87 MG/DL (ref 70–99)
HCT VFR BLD AUTO: 34.5 % (ref 35–47)
HGB BLD-MCNC: 11.4 G/DL (ref 11.7–15.7)
INTERPRETATION ECG - MUSE: NORMAL
MCH RBC QN AUTO: 30.8 PG (ref 26.5–33)
MCHC RBC AUTO-ENTMCNC: 33 G/DL (ref 31.5–36.5)
MCV RBC AUTO: 93 FL (ref 78–100)
P AXIS - MUSE: 35 DEGREES
PLATELET # BLD AUTO: 223 10E3/UL (ref 150–450)
POTASSIUM SERPL-SCNC: 3.7 MMOL/L (ref 3.4–5.3)
PR INTERVAL - MUSE: 140 MS
QRS DURATION - MUSE: 90 MS
QT - MUSE: 450 MS
QTC - MUSE: 450 MS
R AXIS - MUSE: 56 DEGREES
RBC # BLD AUTO: 3.7 10E6/UL (ref 3.8–5.2)
SODIUM SERPL-SCNC: 140 MMOL/L (ref 135–145)
SYSTOLIC BLOOD PRESSURE - MUSE: NORMAL MMHG
T AXIS - MUSE: 21 DEGREES
VENTRICULAR RATE- MUSE: 60 BPM
WBC # BLD AUTO: 6.4 10E3/UL (ref 4–11)

## 2023-11-21 PROCEDURE — 85027 COMPLETE CBC AUTOMATED: CPT | Performed by: HOSPITALIST

## 2023-11-21 PROCEDURE — 250N000013 HC RX MED GY IP 250 OP 250 PS 637: Performed by: HOSPITALIST

## 2023-11-21 PROCEDURE — 99232 SBSQ HOSP IP/OBS MODERATE 35: CPT | Performed by: HOSPITALIST

## 2023-11-21 PROCEDURE — 97161 PT EVAL LOW COMPLEX 20 MIN: CPT | Mod: GP

## 2023-11-21 PROCEDURE — 36415 COLL VENOUS BLD VENIPUNCTURE: CPT | Performed by: HOSPITALIST

## 2023-11-21 PROCEDURE — 999N000111 HC STATISTIC OT IP EVAL DEFER

## 2023-11-21 PROCEDURE — 250N000011 HC RX IP 250 OP 636: Mod: JZ | Performed by: HOSPITALIST

## 2023-11-21 PROCEDURE — G0378 HOSPITAL OBSERVATION PER HR: HCPCS

## 2023-11-21 PROCEDURE — 250N000012 HC RX MED GY IP 250 OP 636 PS 637: Performed by: HOSPITALIST

## 2023-11-21 PROCEDURE — 80048 BASIC METABOLIC PNL TOTAL CA: CPT | Performed by: HOSPITALIST

## 2023-11-21 RX ORDER — DIAZEPAM 10 MG/2ML
2.5 INJECTION, SOLUTION INTRAMUSCULAR; INTRAVENOUS EVERY 8 HOURS PRN
Status: DISCONTINUED | OUTPATIENT
Start: 2023-11-21 | End: 2023-11-22 | Stop reason: HOSPADM

## 2023-11-21 RX ORDER — PREDNISONE 20 MG/1
60 TABLET ORAL DAILY
Status: DISCONTINUED | OUTPATIENT
Start: 2023-11-21 | End: 2023-11-22 | Stop reason: HOSPADM

## 2023-11-21 RX ADMIN — ONDANSETRON 4 MG: 2 INJECTION INTRAMUSCULAR; INTRAVENOUS at 00:07

## 2023-11-21 RX ADMIN — PREDNISONE 60 MG: 20 TABLET ORAL at 16:23

## 2023-11-21 RX ADMIN — Medication 500 MCG: at 08:06

## 2023-11-21 RX ADMIN — FAMOTIDINE 20 MG: 20 TABLET ORAL at 19:29

## 2023-11-21 RX ADMIN — FAMOTIDINE 20 MG: 20 TABLET ORAL at 00:00

## 2023-11-21 RX ADMIN — THERA TABS 1 TABLET: TAB at 08:06

## 2023-11-21 RX ADMIN — FAMOTIDINE 20 MG: 20 TABLET ORAL at 08:06

## 2023-11-21 RX ADMIN — MECLIZINE HYDROCHLORIDE 25 MG: 25 TABLET ORAL at 15:04

## 2023-11-21 ASSESSMENT — ACTIVITIES OF DAILY LIVING (ADL)
ADLS_ACUITY_SCORE: 20
ADLS_ACUITY_SCORE: 22
ADLS_ACUITY_SCORE: 20
ADLS_ACUITY_SCORE: 22
ADLS_ACUITY_SCORE: 22
ADLS_ACUITY_SCORE: 20
ADLS_ACUITY_SCORE: 20
ADLS_ACUITY_SCORE: 22
ADLS_ACUITY_SCORE: 22
ADLS_ACUITY_SCORE: 20
ADLS_ACUITY_SCORE: 22
ADLS_ACUITY_SCORE: 20

## 2023-11-21 NOTE — PROGRESS NOTES
[facility]    Hospitalist Progress Note      Assessment & Plan   Danika Hyman is a 68 year old female w/PMH of GERD, osteoporosis who presents with dizziness and suspected to have BPPV     #Dizziness. Possible vestibular neuritis: She had completed a pilates workout and was washing her hands when she became acutely dizzy like the room was spinning, nauseas and diaphoretic. She sat down and EMS was called.  No headache.  She did endorse some prior URI type symptoms.  In ED was noted to have recurrent nausea and dizziness that was very sensitive to head movements and had dry heaves but no vomiting. She denies any associated headache, vision changes, focal weakness, tinnitus.   -Patient has been afebrile and hemodynamically stable.  Orthostatics negative.  Her BMP and CBC are unremarkable.  EKG with sinus rhythm without any clear ischemic changes.  Troponin negative.  MRI of the brain was done that showed a few tiny scattered focal areas of abnormal T2 hyperintensity that were nonspecific but could be related to migraines, previous trauma, demyelinating disease, infection or chronic small vessel ischemic changes.  There did not seem to be any other major acute abnormalities.  -Patient was started on meclizine, Valium and Zofran.  -Seen by physical therapy on 11/21.  Negative Edward-Hallpike.  Patient with ongoing dizziness and not able to discharge home.  -Given previous URI symptoms, some concern over possible vestibular neuritis as etiology of her dizziness.  -We will start prednisone for possible vestibular neuritis. If still no improvement by tomorrow AM, will consult neurology to evaluate.   -Appreciate PT consultation.      #Hx GERD, osteoporosis: resume home pepcid, multivitamins    DVT Prophylaxis: Pneumatic Compression Devices  Dispo: At least another day.   Code Status:  full code    Ian Pritchett MD    Interval History   Still with dizziness with movement. Seems to come on with moving her  head. Denies chest pain. No palpitations. No AP. Still with some periodic nausea with dizziness episodes.  She does endorse some recent URI type symptoms.      -Data reviewed today: I reviewed all new labs and imaging results over the last 24 hours. I personally reviewed   Physical Exam   Temp: 99.1  F (37.3  C) Temp src: Oral BP: 95/72 Pulse: 77   Resp: 16 SpO2: 98 % O2 Device: None (Room air)    Vitals:    11/20/23 1109 11/20/23 2352   Weight: 61.2 kg (135 lb) 62.1 kg (137 lb)     Vital Signs with Ranges  Temp:  [97.7  F (36.5  C)-99.1  F (37.3  C)] 99.1  F (37.3  C)  Pulse:  [65-77] 77  Resp:  [16-20] 16  BP: ()/(60-74) 95/72  SpO2:  [94 %-99 %] 98 %  I/O last 3 completed shifts:  In: 140 [P.O.:140]  Out: -     Constitutional: Nontoxic, NAD  HEENT: Normocephalic. MMM, No elevation of JVD noted.   Respiratory: Nl WOB, Clear bilaterally, No wheezes or crackles  Cardiovascular: Regular, no murmur  GI: BS+, NT, ND  Skin/Integumen: WWP, no rash. No edema  Neuro: CNII-XII intact. Moves all extremities.  She has 5 out of 5 strength in all extremities.  Sensation is intact in all extremities.  No double vision or loss of vision.  No nystagmus.  No pronator drift.    Medications      cyanocobalamin  500 mcg Oral Daily    famotidine  20 mg Oral BID    multivitamin, therapeutic  1 tablet Oral Daily       Data   Recent Labs   Lab 11/21/23  0653 11/20/23  1352   WBC 6.4 10.1   HGB 11.4* 13.2   MCV 93 94    263    138   POTASSIUM 3.7 3.5   CHLORIDE 106 99   CO2 25 23   BUN 11.9 19.9   CR 0.61 0.57   ANIONGAP 9 16*   MAXIMO 8.2* 9.0   GLC 87 138*       Recent Results (from the past 24 hour(s))   MR Brain w/o & w Contrast    Narrative    MRI OF THE BRAIN WITHOUT AND WITH CONTRAST 11/20/2023 4:08 PM     COMPARISON: None    HISTORY:  Dizziness     TECHNIQUE: Multi-sequence, multi-planar MRI images of the brain were  acquired before and after the administration of IV gadolinium (6mL  Gadavist).    FINDINGS: There  are a few tiny scattered focal areas of abnormal T2  signal hyperintensity in the deep and subcortical white matter of the  cerebral hemispheres bilaterally that are nonspecific with an  extensive differential including: Sequela of migraine headaches,  sequela of previous trauma, demyelinating disease (ADEM, MS),  infection (Lyme disease), and chronic small vessel ischemic disease  (found in patients with long-standing hypertension and/or diabetes).  Gray-white differentiation of the brain is otherwise within normal  limits.    The ventricles and basal cisterns are normal in configuration. There  is no midline shift. There are no extra-axial fluid collections. There  is no evidence for stroke or acute intracranial hemorrhage. There is  no abnormal contrast enhancement in the brain or its coverings.    There is no sinusitis or mastoiditis.      Impression    IMPRESSION: Nonspecific cerebral white matter changes with  differential as above. Otherwise, normal brain MRI. No evidence for  acute intracranial pathology.       GONZÁLEZ DYE MD         SYSTEM ID:  [id number]

## 2023-11-21 NOTE — ED NOTES
[facility]  ED Nurse Handoff Report    ED Chief complaint: Dizziness  . ED Diagnosis:   Final diagnoses:   Dizziness   Vertigo       Allergies:   Allergies   Allergen Reactions    Morphine And Related      Vomiting       Code Status: Full Code    Activity level - Baseline/Home:  independent.  Activity Level - Current:   independent.   Lift room needed: No.   Bariatric: No   Needed: No   Isolation: No.   Infection: Not Applicable.     Respiratory status: Room air    Vital Signs (within 30 minutes):   Vitals:    11/20/23 2119 11/20/23 2124 11/20/23 2130 11/20/23 2157   BP: 125/74  122/71    Pulse: 71  71    Resp:       Temp:       TempSrc:       SpO2:  99% 99% 97%   Weight:       Height:           Cardiac Rhythm:  ,      Pain level:    Patient confused: No.   Patient Falls Risk: activity supervised.   Elimination Status: Has voided     Patient Report - Initial Complaint: Dizziness.   Focused Assessment: Resp, cards     Abnormal Results:   Labs Ordered and Resulted from Time of ED Arrival to Time of ED Departure   BASIC METABOLIC PANEL - Abnormal       Result Value    Sodium 138      Potassium 3.5      Chloride 99      Carbon Dioxide (CO2) 23      Anion Gap 16 (*)     Urea Nitrogen 19.9      Creatinine 0.57      GFR Estimate >90      Calcium 9.0      Glucose 138 (*)    MAGNESIUM - Normal    Magnesium 1.7     TROPONIN T, HIGH SENSITIVITY - Normal    Troponin T, High Sensitivity <6     CBC WITH PLATELETS AND DIFFERENTIAL    WBC Count 10.1      RBC Count 4.25      Hemoglobin 13.2      Hematocrit 39.8      MCV 94      MCH 31.1      MCHC 33.2      RDW 13.2      Platelet Count 263      % Neutrophils 78      % Lymphocytes 15      % Monocytes 5      % Eosinophils 1      % Basophils 1      % Immature Granulocytes 0      NRBCs per 100 WBC 0      Absolute Neutrophils 8.0      Absolute Lymphocytes 1.5      Absolute Monocytes 0.5      Absolute Eosinophils 0.1      Absolute Basophils 0.1       Absolute Immature Granulocytes 0.0      Absolute NRBCs 0.0          MR Brain w/o & w Contrast   Final Result   IMPRESSION: Nonspecific cerebral white matter changes with   differential as above. Otherwise, normal brain MRI. No evidence for   acute intracranial pathology.          GONZÁLEZ DYE MD            SYSTEM ID:  [id number]          Treatments provided: Labs, road test  Family Comments: None  OBS brochure/video discussed/provided to patient:  Yes  ED Medications:   Medications   ondansetron (ZOFRAN) injection 4 mg (4 mg Intravenous $Given 11/20/23 1353)   gadobutrol (GADAVIST) injection 6 mL (6 mLs Intravenous $Given 11/20/23 1609)   sodium chloride 0.9% BOLUS 1,000 mL (0 mLs Intravenous Stopped 11/20/23 2255)   diazepam (VALIUM) injection 2 mg (2 mg Intravenous $Given 11/20/23 2122)   meclizine (ANTIVERT) tablet 25 mg (25 mg Oral $Given 11/20/23 2123)       Drips infusing:  No  For the majority of the shift this patient was Green.   Interventions performed were Comfort.    Sepsis treatment initiated: No    Cares/treatment/interventions/medications to be completed following ED care: None    ED Nurse Name: Enrike Carr RN  10:58 PM  RECEIVING UNIT ED HANDOFF REVIEW    Above ED Nurse Handoff Report was reviewed: Yes  Reviewed by: Mady Mcdonough RN on November 20, 2023 at 11:13 PM

## 2023-11-21 NOTE — PLAN OF CARE
Goal Outcome Evaluation:      Plan of Care Reviewed With: patient    Overall Patient Progress: no changeOverall Patient Progress: no change    Outcome Evaluation: pending PT vestibular evaluation    PRIMARY DIAGNOSIS: VERTIGO    OUTPATIENT/OBSERVATION GOALS TO BE MET BEFORE DISCHARGE  1. Orthostatic performed: No    2. Completion of appropriate imaging: Yes    3. Tolerating PO medications: Yes    4. Return to near baseline physical activity: No, unsteady gait    5. Cleared for discharge by consultants (if involved): No, PT vestibular evaluation.    Discharge Planner Nurse   Safe discharge environment identified: Yes  Barriers to discharge: Yes, dizziness and PT evaluation this afternoon.       Entered by: Magdalena Delong RN 11/21/2023 12:17 PM     Please review provider order for any additional goals.   Nurse to notify provider when observation goals have been met and patient is ready for discharge.

## 2023-11-21 NOTE — ED NOTES
Pt was able to stand but was very wobbly and was not safe with taking steps. Pt currently back in bed on monitor with side rails up and family at bedside.

## 2023-11-21 NOTE — PROGRESS NOTES
"PRIMARY DIAGNOSIS: Dizziness/vertigo  OUTPATIENT/OBSERVATION GOALS TO BE MET BEFORE DISCHARGE:  ADLs back to baseline: No. Pt unstable and still dizzy when move    Activity and level of assistance: Ax1 gaitbelt    Pain status: Pain free.    Return to near baseline physical activity: No     Discharge Planner Nurse   Safe discharge environment identified: No  Barriers to discharge: No       Entered by: Mady Mcdonough RN 11/21/2023 2:15 AM   /60   Pulse 72   Temp 97.7  F (36.5  C) (Oral)   Resp 20   Ht 1.626 m (5' 4\")   Wt 62.1 kg (137 lb)   LMP 04/17/2009   SpO2 94%   BMI 23.52 kg/m      Please review provider order for any additional goals.   Nurse to notify provider when observation goals have been met and patient is ready for discharge.  "

## 2023-11-21 NOTE — PLAN OF CARE
Goal Outcome Evaluation:      Plan of Care Reviewed With: patient    Overall Patient Progress: no changeOverall Patient Progress: no change       PRIMARY DIAGNOSIS: VERTIGO    OUTPATIENT/OBSERVATION GOALS TO BE MET BEFORE DISCHARGE  1. Orthostatic performed: No    2. Completion of appropriate imaging: Yes    3. Tolerating PO medications: Yes    4. Return to near baseline physical activity: No, unsteady gait, dizziness upon position change    5. Cleared for discharge by consultants (if involved): No, PT to see patient this afternoon for vestibular evaluation    Discharge Planner Nurse   Safe discharge environment identified: No  Barriers to discharge: Yes, vestibular evaluation this afternoon.       Entered by: Magdalena Delnog RN 11/21/2023 8:16 AM     Please review provider order for any additional goals.   Nurse to notify provider when observation goals have been met and patient is ready for discharge.

## 2023-11-21 NOTE — ED PROVIDER NOTES
History     Chief Complaint:  Dizziness       HPI   Danika Hyman is a 68 year old female presenting to the ED for evaluation of dizziness.  Patient reports that she had finished a Pilates course earlier today.  Afterwards, she was using the restroom.  She was going to the sink where she bent her head down.  She subsequently looked to the side to see what sink was open, and then look to the other side to get a paper towel.  Around that time, she believes she developed a feeling of dizziness, and unsteadiness.  She describes the dizziness as lightheadedness, and room spinning, thinking that she may pass out.  She sat down, hoping it would pass, though given the persistence in her symptoms, she ultimately presents to the ED for further evaluation.  Patient noted feelings of nausea and sweatiness.  She denied unilateral numbness, weakness, speech or vision changes.  She denies any prior history of vertigo similar to this in the past, though has acknowledged feeling lightheaded at times when she stands up too quickly.  Denies current headache or neck pain.  No prior history of stroke.    Independent Historian:   Spouse/Partner - They report patient has been dealing with URI symptoms for the past few weeks    Review of External Notes:   Pulmonology note reviewed was diagnosed with bronchiectasis.  This note was from 8/21/2023.       Medications:    No current outpatient medications on file.      Past Medical History:    Past Medical History:   Diagnosis Date    Esophageal reflux     Osteoporosis 05/04/2017    Status post bariatric surgery 1982       Past Surgical History:    Past Surgical History:   Procedure Laterality Date    CATARACT IOL, RT/LT  09/2016    ZZ NONSPECIFIC PROCEDURE      wisdom teeth extraction,abstracted    Z NONSPECIFIC PROCEDURE      T&A,abstracted    ZC NONSPECIFIC PROCEDURE      2 natural childbirths,abstracted    Z NONSPECIFIC PROCEDURE  1982    gastric stapling ast [facility]        Physical  "Exam   Patient Vitals for the past 24 hrs:   BP Temp Temp src Pulse Resp SpO2 Height Weight   11/20/23 2157 -- -- -- -- -- 97 % -- --   11/20/23 2130 122/71 -- -- 71 -- 99 % -- --   11/20/23 2124 -- -- -- -- -- 99 % -- --   11/20/23 2119 125/74 -- -- 71 -- -- -- --   11/20/23 1109 116/58 98.7  F (37.1  C) Temporal 62 20 100 % 1.626 m (5' 4\") 61.2 kg (135 lb)        Physical Exam  General:   Well-nourished   Speaking in full sentences   Appears uncomfortable  Eyes:   Conjunctiva without injection or scleral icterus   EOM full though lateral nystagmus with left gaze deviation  ENT:   Moist mucous membranes   TM translucent and gray bilaterally   No effusion   No vesicular lesions to external auditory canal   Nares patent   Pinnae normal  Neck:   Full ROM   No stiffness appreciated  Resp:   Lungs CTAB   No crackles, wheezing or audible rubs   Good air movement  CV:    Normal rate, regular rhythm   S1 and S2 present   No murmur, gallop or rub  GI:   BS present   Abdomen soft without distention   Non-tender to light and deep palpation   No guarding or rebound tenderness  Skin:   Warm, dry, well perfused   No rashes or open wounds on exposed skin  MSK:   Moves all extremities   No focal deformities or swelling  Neuro:   Alert   CN III-XII grossly intact   No arm drift   5/5  strength   5/5 hip flexion   SILT in BUE and BLE   Answers questions appropriately   Moves all extremities equally  Psych:   Normal affect, normal mood      Emergency Department Course     ECG results from 11/20/23   EKG 12-lead, tracing only     Value    Systolic Blood Pressure     Diastolic Blood Pressure     Ventricular Rate 60    Atrial Rate 60    NE Interval 140    QRS Duration 90        QTc 450    P Axis 35    R AXIS 56    T Axis 21    Interpretation ECG      Sinus rhythm  Minimal voltage criteria for LVH, may be normal variant  Borderline ECG  No previous ECGs available           Imaging:  MR Brain w/o & w Contrast   Final Result "   IMPRESSION: Nonspecific cerebral white matter changes with   differential as above. Otherwise, normal brain MRI. No evidence for   acute intracranial pathology.          GONZÁLEZ DYE MD            SYSTEM ID:  [id number]             Laboratory:  Labs Ordered and Resulted from Time of ED Arrival to Time of ED Departure   BASIC METABOLIC PANEL - Abnormal       Result Value    Sodium 138      Potassium 3.5      Chloride 99      Carbon Dioxide (CO2) 23      Anion Gap 16 (*)     Urea Nitrogen 19.9      Creatinine 0.57      GFR Estimate >90      Calcium 9.0      Glucose 138 (*)    MAGNESIUM - Normal    Magnesium 1.7     TROPONIN T, HIGH SENSITIVITY - Normal    Troponin T, High Sensitivity <6     CBC WITH PLATELETS AND DIFFERENTIAL    WBC Count 10.1      RBC Count 4.25      Hemoglobin 13.2      Hematocrit 39.8      MCV 94      MCH 31.1      MCHC 33.2      RDW 13.2      Platelet Count 263      % Neutrophils 78      % Lymphocytes 15      % Monocytes 5      % Eosinophils 1      % Basophils 1      % Immature Granulocytes 0      NRBCs per 100 WBC 0      Absolute Neutrophils 8.0      Absolute Lymphocytes 1.5      Absolute Monocytes 0.5      Absolute Eosinophils 0.1      Absolute Basophils 0.1      Absolute Immature Granulocytes 0.0      Absolute NRBCs 0.0          Procedures   None    Emergency Department Course & Assessments:             Interventions:  Medications   ondansetron (ZOFRAN) injection 4 mg (4 mg Intravenous $Given 11/20/23 1353)   sodium chloride 0.9% BOLUS 1,000 mL (has no administration in time range)   diazepam (VALIUM) injection 2 mg (has no administration in time range)   meclizine (ANTIVERT) tablet 25 mg (has no administration in time range)   gadobutrol (GADAVIST) injection 6 mL (6 mLs Intravenous $Given 11/20/23 1609)        Assessments:  See below    Independent Interpretation (X-rays, CTs, rhythm strip):  None    Consultations/Discussion of Management or Tests:   ED Course as of 11/20/23 9357   Mon Nov  20, 2023 2201 Patient re-evaluated   2239 Spoke with Dr. Fabian.        Social Determinants of Health affecting care:   None    Disposition:  The patient was admitted to the hospital under the care of Dr. Fabian.     Impression & Plan      Medical Decision Making:  Danika Hyman is a 68-year-old female presenting to the ED for evaluation of dizziness.  VS on presentation as noted above.  Given prolonged boarding and wait times, care initiated through tele-triage system.  Differential diagnosis includes peripheral etiologies (BPPV, vestibular neuritis, laminitis), CNS pathology (CVA, TIA, vascular dissection), ACS, anemia, among others.  At this point, symptoms seem most suspicious for peripheral etiologies given associated head movements just prior to the onset of her symptoms.  MRI was fortunately negative for evidence of acute CVA.  Her neurologic exam is otherwise nonfocal.  She denies notable headache or neck pain at the time of my evaluation.  She was provided the above supportive cares, though continues to remain symptomatic and was unable to safely ambulate independently.  Other etiologies were considered though felt less likely based on the above historical features.  She denies symptoms of chest pain or chest pressure.  EKG demonstrates sinus rhythm without findings of acute ischemia and troponin has returned within normal limits.  We will plan hospital observation for further supportive treatment, PT/OT, and may consider neurology consultation if symptoms fail to improve.  Patient and spouse updated and in agreement with plan of care.      Diagnosis:    ICD-10-CM    1. Dizziness  R42       2. Vertigo  R42        11/20/2023   Davon Washington MD Roach, Brian Donald, MD  11/20/23 3848

## 2023-11-21 NOTE — PROGRESS NOTES
PRIMARY DIAGNOSIS: VERTIGO    OUTPATIENT/OBSERVATION GOALS TO BE MET BEFORE DISCHARGE  1. Orthostatic performed: No    2. Completion of appropriate imaging: Yes    3. Tolerating PO medications: Yes    4. Return to near baseline physical activity: No    5. Cleared for discharge by consultants (if involved): No    Discharge Planner Nurse   Safe discharge environment identified: No  Barriers to discharge: Yes       Entered by: Mady Mcdonough RN 11/21/2023 6:23 AM     Aox4. VSS on RA. Denies pain, sob. Dizziness reported when move and nausea. IV Zofran given once with relief. Skin wdl. LS wdl. BS wdl. Regular diet. Ax1 with gaitbelt. SL. Continue monitor.  Please review provider order for any additional goals.   Nurse to notify provider when observation goals have been met and patient is ready for discharge.

## 2023-11-21 NOTE — PLAN OF CARE
Occupational Therapy: Orders received. Chart reviewed and discussed with care team.? Occupational Therapy not indicated as OT does not provide vestibular rehab. PT also consulted - will defer to PT as appropriate vestibular service.? Defer discharge recommendations to PT/care team.? Will complete IP OT orders (error as OT does not perform vestibular rehab in acute care setting).

## 2023-11-21 NOTE — PHARMACY-ADMISSION MEDICATION HISTORY
Pharmacist Admission Medication History    Admission medication history is complete. The information provided in this note is only as accurate as the sources available at the time of the update.    Information Source(s): Patient via in-person    Pertinent Information: none    Changes made to PTA medication list:  Added: collagen 1500  Deleted: None  Changed: calcium plus    Medication Affordability:  Not including over the counter (OTC) medications, was there a time in the past 3 months when you did not take your medications as prescribed because of cost?: No    Allergies reviewed with patient and updates made in EHR: yes    Medication History Completed By: Andrew Price RPH 11/20/2023 10:51 PM    Prior to Admission medications    Medication Sig Last Dose Taking? Auth Provider Long Term End Date   CALCIUM PLUS TABS   OR Take 1 tablet by mouth 2 times daily 11/20/2023 at am Yes      CENTRUM SILVER OR 1 tablet daily 11/20/2023 at am Yes Reported, Patient     Collagen-Vitamin C-Biotin (COLLAGEN 1500/C PO) Take 1 tablet by mouth daily 11/20/2023 at am Yes Reported, Patient     estradiol (ESTRACE) 0.1 MG/GM vaginal cream PLACE 1 GRAM VAGINALLY 2 TIMES EVERY WEEK Past Week at thur Yes Dexter Floyd MD     famotidine (PEPCID) 20 MG tablet Take 1 tablet (20 mg) by mouth 2 times daily 11/20/2023 at am Yes Dexter Floyd MD     VITAMIN B-12 500 MCG OR TABS 1 sublingual qday 11/20/2023 at am Yes

## 2023-11-21 NOTE — H&P
[facility]    History and Physical - Hospitalist Service       Date of Admission:  11/20/2023    Assessment & Plan     Danika Hyman is a 68 year old female w/PMH of GERD, osteoporosis who presents with dizziness and suspected to have BPPV    Suspected BPPV  -presents with acute onset of positional dizziness, nausea, diaphoresis after pilates class. Noted to have some nystagmus on exam  -MRI negative for CVA or acute findings  -s/p meclizine, valium, zofran in ED  -cont supportive care with meclizine and PRN valium  -PT/OT to eval and attempt epley maneuver, if not improving can consider neurology consult    Hx GERD, osteoporosis  -resume home pepcid, multivitamins     Diet:  regular diet  DVT Prophylaxis: Pneumatic Compression Devices  Snow Catheter: Not present  Lines: None     Cardiac Monitoring: None  Code Status:  full code    Hank Fabian DO  Hospitalist Service  [facility]  Securely message with [employer] (more info)  Text page via [employer] Paging/Directory     ______________________________________________________________________    Chief Complaint   Dizziness, nausea    History of Present Illness   Danika Hyman is a 68 year old female w/PMH of GERD, osteoporosis who presents with dizziness. She had completed a pilates workout and was washing her hands when she became acutely dizzy like the room was spinning, nauseas and diaphoretic. She sat down and EMS was called. In ED was noted to have recurrent nausea and dizziness that was very sensitive to head movements and had dry heaves but no vomiting. She denies any associated headache, vision changes, focal weakness, tinnitus.     In ED had MRI negative for CVA, labs and vitals were reassuring. She was given valium, meclizine, zofran with some mild improvement.       Past Medical History    Past Medical History:   Diagnosis Date    Esophageal reflux     Osteoporosis 05/04/2017    DEXA:  lumbar -2.8, left hip -2.1,  right hip -2.5    Status post bariatric surgery 1982    vertical banded gastrosplasty at [facility]       Past Surgical History   Past Surgical History:   Procedure Laterality Date    CATARACT IOL, RT/LT  2016    [facility] NONSPECIFIC PROCEDURE      wisdom teeth extraction,abstracted    [facility] NONSPECIFIC PROCEDURE      T&A,abstracted    [facility] NONSPECIFIC PROCEDURE      2 natural childbirths,abstracted    [facility] NONSPECIFIC PROCEDURE  1982    gastric stapling ast [facility]       Prior to Admission Medications   Prior to Admission Medications   Prescriptions Last Dose Informant Patient Reported? Taking?   CALCIUM PLUS TABS   OR 2023 at am  No Yes   Sig: Take 1 tablet by mouth 2 times daily   CENTRUM SILVER OR 2023 at am  Yes Yes   Si tablet daily   Collagen-Vitamin C-Biotin (COLLAGEN 1500/C PO) 2023 at am  Yes Yes   Sig: Take 1 tablet by mouth daily   VITAMIN B-12 500 MCG OR TABS 2023 at am  No Yes   Si sublingual qday   estradiol (ESTRACE) 0.1 MG/GM vaginal cream Past Week at thur  No Yes   Sig: PLACE 1 GRAM VAGINALLY 2 TIMES EVERY WEEK   famotidine (PEPCID) 20 MG tablet 2023 at am  Yes Yes   Sig: Take 1 tablet (20 mg) by mouth 2 times daily      Facility-Administered Medications: None        Review of Systems    The 10 point Review of Systems is negative other than noted in the HPI or here.    Social History   I have reviewed this patient's social history and updated it with pertinent information if needed.  Social History     Tobacco Use    Smoking status: Never    Smokeless tobacco: Never   Substance Use Topics    Alcohol use: Yes     Comment: 4 glasses a week    Drug use: No         Family History   I have reviewed this patient's family history and updated it with pertinent information if needed.  Family History   Problem Relation Age of Onset    Arthritis Father         B:192      Diabetes Father     Respiratory Father     Cardiovascular Father     Hypertension Mother      Alcohol/Drug Mother         B: alive    Lung Cancer Mother 89        lung cancer ;  age 91    Family History Negative Sister     Family History Negative Sister     Family History Negative Brother          Allergies   Allergies   Allergen Reactions    Morphine And Related      Vomiting        Physical Exam   Vital Signs: Temp: 98.7  F (37.1  C) Temp src: Temporal BP: 122/71 Pulse: 71   Resp: 20 SpO2: 97 %      Weight: 135 lbs 0 oz    Constitutional: awake, alert, and cooperative  Eyes: pupils equal, round and reactive to light and conjunctiva normal  ENT: normocepalic, without obvious abnormality, atramatic  Respiratory: no increased work of breathing, good air exchange, and clear to auscultation  Cardiovascular: regular rate and rhythm, no murmur noted, and no edema  GI: normal bowel sounds, soft, and non-distended  Skin: no bruising or bleeding  Neurologic: alert, oriented, no focal weakness, R beating nystagmus when looking to L and increased nausea with head movement    60 MINUTES SPENT BY ME on the date of service doing chart review, history, exam, documentation & further activities per the note.      Data   ------------------------- PAST 24 HR DATA REVIEWED -----------------------------------------------    I have personally reviewed the following data over the past 24 hrs:    10.1  \   13.2   / 263     138 99 19.9 /  138 (H)   3.5 23 0.57 \     Trop: <6 BNP: N/A       Imaging results reviewed over the past 24 hrs:   Recent Results (from the past 24 hour(s))   MR Brain w/o & w Contrast    Narrative    MRI OF THE BRAIN WITHOUT AND WITH CONTRAST 2023 4:08 PM     COMPARISON: None    HISTORY:  Dizziness     TECHNIQUE: Multi-sequence, multi-planar MRI images of the brain were  acquired before and after the administration of IV gadolinium (6mL  Gadavist).    FINDINGS: There are a few tiny scattered focal areas of abnormal T2  signal hyperintensity in the deep and subcortical white matter of  the  cerebral hemispheres bilaterally that are nonspecific with an  extensive differential including: Sequela of migraine headaches,  sequela of previous trauma, demyelinating disease (ADEM, MS),  infection (Lyme disease), and chronic small vessel ischemic disease  (found in patients with long-standing hypertension and/or diabetes).  Gray-white differentiation of the brain is otherwise within normal  limits.    The ventricles and basal cisterns are normal in configuration. There  is no midline shift. There are no extra-axial fluid collections. There  is no evidence for stroke or acute intracranial hemorrhage. There is  no abnormal contrast enhancement in the brain or its coverings.    There is no sinusitis or mastoiditis.      Impression    IMPRESSION: Nonspecific cerebral white matter changes with  differential as above. Otherwise, normal brain MRI. No evidence for  acute intracranial pathology.       GONZÁLEZ DYE MD         SYSTEM ID:  [id number]

## 2023-11-22 ENCOUNTER — APPOINTMENT (OUTPATIENT)
Dept: PHYSICAL THERAPY | Facility: CLINIC | Age: 68
DRG: 149 | End: 2023-11-22
Payer: MEDICARE

## 2023-11-22 VITALS
RESPIRATION RATE: 14 BRPM | TEMPERATURE: 97.8 F | OXYGEN SATURATION: 97 % | WEIGHT: 137 LBS | BODY MASS INDEX: 23.39 KG/M2 | HEART RATE: 68 BPM | HEIGHT: 64 IN | SYSTOLIC BLOOD PRESSURE: 110 MMHG | DIASTOLIC BLOOD PRESSURE: 69 MMHG

## 2023-11-22 PROCEDURE — G0378 HOSPITAL OBSERVATION PER HR: HCPCS

## 2023-11-22 PROCEDURE — 250N000012 HC RX MED GY IP 250 OP 636 PS 637: Performed by: HOSPITALIST

## 2023-11-22 PROCEDURE — 97530 THERAPEUTIC ACTIVITIES: CPT | Mod: GP

## 2023-11-22 PROCEDURE — 250N000013 HC RX MED GY IP 250 OP 250 PS 637: Performed by: HOSPITALIST

## 2023-11-22 PROCEDURE — 99232 SBSQ HOSP IP/OBS MODERATE 35: CPT | Performed by: HOSPITALIST

## 2023-11-22 PROCEDURE — 120N000001 HC R&B MED SURG/OB

## 2023-11-22 RX ORDER — ONDANSETRON 4 MG/1
4 TABLET, ORALLY DISINTEGRATING ORAL EVERY 8 HOURS PRN
Qty: 15 TABLET | Refills: 0 | Status: SHIPPED | OUTPATIENT
Start: 2023-11-22 | End: 2023-11-29

## 2023-11-22 RX ORDER — MECLIZINE HYDROCHLORIDE 25 MG/1
25 TABLET ORAL 3 TIMES DAILY PRN
Qty: 15 TABLET | Refills: 0 | Status: SHIPPED | OUTPATIENT
Start: 2023-11-22 | End: 2023-11-27

## 2023-11-22 RX ADMIN — THERA TABS 1 TABLET: TAB at 08:55

## 2023-11-22 RX ADMIN — FAMOTIDINE 20 MG: 20 TABLET ORAL at 08:55

## 2023-11-22 RX ADMIN — PREDNISONE 60 MG: 20 TABLET ORAL at 08:55

## 2023-11-22 RX ADMIN — Medication 500 MCG: at 08:55

## 2023-11-22 ASSESSMENT — ACTIVITIES OF DAILY LIVING (ADL)
ADLS_ACUITY_SCORE: 20
ADLS_ACUITY_SCORE: 22
ADLS_ACUITY_SCORE: 22
ADLS_ACUITY_SCORE: 20
ADLS_ACUITY_SCORE: 24
ADLS_ACUITY_SCORE: 20
ADLS_ACUITY_SCORE: 24
ADLS_ACUITY_SCORE: 24
ADLS_ACUITY_SCORE: 20

## 2023-11-22 NOTE — DISCHARGE SUMMARY
[facility]    Discharge Summary  Hospitalist    Date of Admission:  11/20/2023  Date of Discharge:  11/22/2023  Discharging Provider: Ian Pritchett MD  Date of Service (when I saw the patient): 11/22/23    Discharge Diagnoses   #Dizziness suspected BPPV    #Hx GERD, osteoporosis    Hospital Course   Danika Hyman is a 68 year old female w/PMH of GERD, osteoporosis who presents with dizziness and suspected to have BPPV     #Dizziness. Possible vestibular neuritis: She had completed a pilates workout and was washing her hands when she became acutely dizzy like the room was spinning, nauseas and diaphoretic. She sat down and EMS was called.  No headache.  She did endorse some prior URI type symptoms.  In ED was noted to have recurrent nausea and dizziness that was very sensitive to head movements and had dry heaves but no vomiting. She denies any associated headache, vision changes, focal weakness, tinnitus.   -Patient has been afebrile and hemodynamically stable.  Orthostatics negative.  Her BMP and CBC are unremarkable.  EKG with sinus rhythm without any clear ischemic changes.  Troponin negative.  MRI of the brain was done that showed a few tiny scattered focal areas of abnormal T2 hyperintensity that were nonspecific but could be related to migraines, previous trauma, demyelinating disease, infection or chronic small vessel ischemic changes.  There did not seem to be any other major acute abnormalities.  -Patient was started on meclizine, Valium and Zofran.  -Seen by physical therapy on 11/21.  Patient had negative Edward-Hallpike maneuver at that time.  She was still having ongoing dizziness.  Given her previous URI type symptoms with congestion and runny nose, there was some concern over vestibular neuritis.  She was started on prednisone for this possibility.  Neurology was consulted.  They evaluated the patient and they performed Epley maneuver and taught therapy maneuvers to the patient.   Patient felt better and voiced preference for discharge home.  She was discharged home on a course of meclizine and Zofran available as needed for symptoms.  She will follow-up with her PCP in 1 week and neurology in 4 to 6 weeks.     #Hx GERD, osteoporosis: resume home pepcid, multivitamins    Ian Pritchett MD    Code Status   Full Code       Primary Care Physician   Dexter Floyd    Physical Exam   Temp: 97.8  F (36.6  C) Temp src: Oral BP: 110/69 Pulse: 68   Resp: 14 SpO2: 97 % O2 Device: None (Room air)    Vitals:    11/20/23 1109 11/20/23 2352   Weight: 61.2 kg (135 lb) 62.1 kg (137 lb)     Vital Signs with Ranges  Temp:  [97.4  F (36.3  C)-97.8  F (36.6  C)] 97.8  F (36.6  C)  Pulse:  [65-79] 68  Resp:  [14] 14  BP: (101-110)/(63-75) 110/69  SpO2:  [97 %-98 %] 97 %  I/O last 3 completed shifts:  In: 300 [P.O.:300]  Out: -     Constitutional: Nontoxic, NAD  HEENT: Normocephalic. MMM, No elevation of JVD noted.   Respiratory: Nl WOB, Clear bilaterally, No wheezes or crackles  Cardiovascular: Regular, no murmur  GI: BS+, NT, ND  Skin/Integumen: WWP, no rash. No edema  Neuro: CNII-XII intact. Moves all extremities with full strength intact.   strength intact.  No tremor.  She does seem to have some right to left nystagmus on exam today.    Discharge Disposition   Discharged to home  Condition at discharge: Stable    Consultations This Hospital Stay   PHYSICAL THERAPY ADULT IP CONSULT  OCCUPATIONAL THERAPY ADULT IP CONSULT  PHYSICAL THERAPY ADULT IP CONSULT  NEUROLOGY IP CONSULT    Time Spent on this Encounter   I, Ian Pritchett MD, personally saw the patient today and spent greater than 30 minutes discharging this patient.    Discharge Orders      Physical Therapy Referral      Reason for your hospital stay    You were hospitalized for dizziness.  You were seen by neurology.  They suspect that you have vertigo.  You were taught exercises to help with dizziness.  Meclizine and zofran were prescribed for short  course if recurrent symptoms of dizziness or nausea, respectively.      You should follow up with PCP in 1 week.     Activity    Your activity upon discharge: activity as tolerated     Follow-up and recommended labs and tests     Follow up with primary care provider, Dexter Floyd, within 7 days for hospital follow- up.  No follow up labs or test are needed.    Follow up in neurology clinic in 4-6 weeks.     Full Code     Diet    Follow this diet upon discharge: Orders Placed This Encounter      Regular Diet Adult     Discharge Medications   Current Discharge Medication List        START taking these medications    Details   meclizine (ANTIVERT) 25 MG tablet Take 1 tablet (25 mg) by mouth 3 times daily as needed for dizziness  Qty: 15 tablet, Refills: 0    Associated Diagnoses: Vertigo      ondansetron (ZOFRAN ODT) 4 MG ODT tab Take 1 tablet (4 mg) by mouth every 8 hours as needed for nausea  Qty: 15 tablet, Refills: 0    Associated Diagnoses: Vertigo           CONTINUE these medications which have NOT CHANGED    Details   CALCIUM PLUS TABS   OR Take 1 tablet by mouth 2 times daily      CENTRUM SILVER OR 1 tablet daily      Collagen-Vitamin C-Biotin (COLLAGEN 1500/C PO) Take 1 tablet by mouth daily      estradiol (ESTRACE) 0.1 MG/GM vaginal cream PLACE 1 GRAM VAGINALLY 2 TIMES EVERY WEEK  Qty: 42.5 g, Refills: 5    Associated Diagnoses: Vaginal atrophy      famotidine (PEPCID) 20 MG tablet Take 1 tablet (20 mg) by mouth 2 times daily  Qty: 60 tablet, Refills: 0    Associated Diagnoses: Gastroesophageal reflux disease without esophagitis      VITAMIN B-12 500 MCG OR TABS 1 sublingual qday  Qty: 30, Refills: 5           Allergies   Allergies   Allergen Reactions    Morphine And Related      Vomiting     Data   Most Recent 3 CBC's:  Recent Labs   Lab Test 11/21/23  0653 11/20/23  1352 06/20/16  0909   WBC 6.4 10.1 5.2   HGB 11.4* 13.2 13.3   MCV 93 94 94    263 199      Most Recent 3 BMP's:  Recent Labs   Lab  Test 11/21/23  0653 11/20/23  1352 04/14/23  0737 09/09/22  1205 01/05/22  0805    138  --   --  137   POTASSIUM 3.7 3.5  --   --  4.0   CHLORIDE 106 99  --   --  105   CO2 25 23  --   --  29   BUN 11.9 19.9  --   --  28   CR 0.61 0.57  --  0.94 0.74   ANIONGAP 9 16*  --   --  3   MAXIMO 8.2* 9.0  --  9.3 9.1   GLC 87 138* 82  --  77     Most Recent 2 LFT's:  Recent Labs   Lab Test 01/05/22  0805 09/01/20  1607   AST 25 31   ALT 30 33   ALKPHOS 51 49   BILITOTAL 0.4 0.4     Most Recent INR's and Anticoagulation Dosing History:  Anticoagulation Dose History           No data to display              Most Recent 3 Troponin's:No lab results found.  Most Recent Cholesterol Panel:  Recent Labs   Lab Test 04/14/23  0737   CHOL 151   LDL 51   HDL 91   TRIG 43     Most Recent 6 Bacteria Isolates From Any Culture (See EPIC Reports for Culture Details):No lab results found.  Most Recent TSH, T4 and A1c Labs:  Recent Labs   Lab Test 06/20/16  0909   TSH 2.89

## 2023-11-22 NOTE — PROGRESS NOTES
"PRIMARY DIAGNOSIS: VERTIGO    OUTPATIENT/OBSERVATION GOALS TO BE MET BEFORE DISCHARGE  1. Orthostatic performed: Yes:  on previous shift        Lying Orthostatic BP: 102/60         Sitting Orthostatic BP: 100/63         Standing Orthostatic BP: 95/72     2. Completion of appropriate imaging: Yes    3. Tolerating PO medications: Yes    4. Return to near baseline physical activity: No. Ax1.    5. Cleared for discharge by consultants (if involved): No. Per pt note \"Patient unable to ambulate safely in order to access home environment.\"    Discharge Planner Nurse   Safe discharge environment identified: Yes  Barriers to discharge: Yes. Dizziness with activity persists.        Entered by: Roger Morton RN 11/22/2023    Patient A/Ox4. VSS on RA but orthostatics positive on previous shift. Dizziness with activity remains.  Please review provider order for any additional goals.   Nurse to notify provider when observation goals have been met and patient is ready for discharge.  "

## 2023-11-22 NOTE — PROGRESS NOTES
"PRIMARY DIAGNOSIS: VERTIGO    OUTPATIENT/OBSERVATION GOALS TO BE MET BEFORE DISCHARGE  1. Orthostatic performed: Yes:  on previous shift        Lying Orthostatic BP: 102/60         Sitting Orthostatic BP: 100/63         Standing Orthostatic BP: 95/72     2. Completion of appropriate imaging: Yes    3. Tolerating PO medications: Yes    4. Return to near baseline physical activity: No. Ax1.    5. Cleared for discharge by consultants (if involved): No. Per pt note \"Patient unable to ambulate safely in order to access home environment.\"    Discharge Planner Nurse   Safe discharge environment identified: Yes  Barriers to discharge: Yes       Entered by: Roger Morton RN 11/22/2023    Patient A/Ox4. VSS on RA but orthostatics positive on previous shift. Dizziness with activity remains.  Please review provider order for any additional goals.   Nurse to notify provider when observation goals have been met and patient is ready for discharge.  "

## 2023-11-22 NOTE — PLAN OF CARE
Goal Outcome Evaluation:      Plan of Care Reviewed With: patient, spouse    Overall Patient Progress: no changeOverall Patient Progress: no change    Outcome Evaluation: vestibular eval--no improvement or change    PRIMARY DIAGNOSIS: VERTIGO    OUTPATIENT/OBSERVATION GOALS TO BE MET BEFORE DISCHARGE  1. Orthostatic performed: Yes:          Lying Orthostatic BP: 102/60         Sitting Orthostatic BP: 100/63         Standing Orthostatic BP: 95/72     2. Completion of appropriate imaging: Yes    3. Tolerating PO medications: Yes    4. Return to near baseline physical activity: No, continues to be dizzy upon standing. Orthostatics positive.    5. Cleared for discharge by consultants (if involved): No    Discharge Planner Nurse   Safe discharge environment identified: Yes  Barriers to discharge: Yes       Entered by: Magdalena Delong RN 11/21/2023 6:25 PM     Please review provider order for any additional goals.   Nurse to notify provider when observation goals have been met and patient is ready for discharge.

## 2023-11-22 NOTE — CONSULTS
Neurology Consult Note  [facility]       [2023]                                                                                       Admission Date: 2023  Hospital Day: 3      Patient: Danika Hyman      : 1955  MRN:  [id number]     CC:      Chief Complaint   Patient presents with    Dizziness       Consult Request:  Referring Provider:  Ian Pritchett MD  Primary Care Provider:  Dexter Floyd MD        HPI:  Danika Hyman is a 68 year old yo female admitted for new onset dizziness after an exercise class on 23. She felt room was spinning, had nausea and cold sweats. She felt intermittent symptoms since with quick head movement to left. She denies any similar symptoms in past. MRI brain - negative.          A complete review of symptoms was performed including vascular, infectious, cardiovascular, pulmonary, gastrointestinal, endocrinological, hematologic, dermatologic, musculoskeletal, and neurological. All were normal except as above.    PAST MEDICAL HISTORY:  ALLERGIES:   Allergies   Allergen Reactions    Morphine And Related      Vomiting     Tobacco:    History   Smoking Status    Never   Smokeless Tobacco    Never     Alcohol:  Social History    Substance and Sexual Activity      Alcohol use: Yes        Comment: 4 glasses a week    MEDICATIONS:       CURRENTLY SCHEDULED MEDICATIONS    cyanocobalamin  500 mcg Oral Daily    famotidine  20 mg Oral BID    multivitamin, therapeutic  1 tablet Oral Daily    predniSONE  60 mg Oral Daily          HOME MEDICATIONS  Medications Prior to Admission   Medication Sig Dispense Refill Last Dose    CALCIUM PLUS TABS   OR Take 1 tablet by mouth 2 times daily   2023 at am    CENTRUM SILVER OR 1 tablet daily   2023 at am    Collagen-Vitamin C-Biotin (COLLAGEN 1500/C PO) Take 1 tablet by mouth daily   2023 at am    estradiol (ESTRACE) 0.1 MG/GM vaginal cream PLACE 1 GRAM VAGINALLY 2 TIMES EVERY  WEEK 42.5 g 5 Past Week at thur    famotidine (PEPCID) 20 MG tablet Take 1 tablet (20 mg) by mouth 2 times daily 60 tablet 0 2023 at am    VITAMIN B-12 500 MCG OR TABS 1 sublingual qday 30 5 2023 at am     MEDICAL HISTORY  Past Medical History:   Diagnosis Date    Esophageal reflux     Osteoporosis 2017    DEXA:  lumbar -2.8, left hip -2.1, right hip -2.5    Status post bariatric surgery 1982    vertical banded gastrosplasty at [facility]     SURGICAL HISTORY  Past Surgical History:   Procedure Laterality Date    CATARACT IOL, RT/LT  2016    [facility] NONSPECIFIC PROCEDURE      wisdom teeth extraction,abstracted    [facility] NONSPECIFIC PROCEDURE      T&A,abstracted    [facility] NONSPECIFIC PROCEDURE      2 natural childbirths,abstracted    [facility] NONSPECIFIC PROCEDURE  1982    gastric stapling ast [facility]     FAMILY HISTORY    Family History   Problem Relation Age of Onset    Arthritis Father         B:      Diabetes Father     Respiratory Father     Cardiovascular Father     Hypertension Mother     Alcohol/Drug Mother         B: alive    Lung Cancer Mother 89        lung cancer ;  age 91    Family History Negative Sister     Family History Negative Sister     Family History Negative Brother      SOCIAL HISTORY  Social History     Socioeconomic History    Marital status:    Occupational History    Occupation:    Tobacco Use    Smoking status: Never    Smokeless tobacco: Never   Substance and Sexual Activity    Alcohol use: Yes     Comment: 4 glasses a week    Drug use: No    Sexual activity: Yes     Partners: Male     Social Determinants of Health     Financial Resource Strain: Low Risk  (2023)    Overall Financial Resource Strain (CARDIA)     Difficulty of Paying Living Expenses: Not hard at all   Food Insecurity: No Food Insecurity (2023)    Hunger Vital Sign     Worried About Running Out of Food in the Last Year: Never true     Ran Out of Food in the Last Year: Never true  "  Transportation Needs: No Transportation Needs (2023)    PRAPARE - Transportation     Lack of Transportation (Medical): No     Lack of Transportation (Non-Medical): No   Physical Activity: Sufficiently Active (2023)    Exercise Vital Sign     Days of Exercise per Week: 6 days     Minutes of Exercise per Session: 50 min   Stress: No Stress Concern Present (2023)    [employer] [place] [employer] - Occupational Stress Questionnaire     Feeling of Stress : Only a little   Social Connections: Unknown (2023)    Social Connection and Isolation Panel [NHANES]     Frequency of Social Gatherings with Friends and Family: Twice a week     Attends Yarsani Services: 1 to 4 times per year     Active Member of Clubs or Organizations: Yes     Marital Status:    Housing Stability: Low Risk  (2023)    Housing Stability Vital Sign     Unable to Pay for Housing in the Last Year: No     Number of Places Lived in the Last Year: 1     Unstable Housing in the Last Year: No            Height: 162.6 cm (5' 4\")     Temp: 97.5  F (36.4  C)   Weight: 62.1 kg (137 lb)    Temp src: Oral         BP: 101/63         Estimated body mass index is 23.52 kg/m  as calculated from the following:    Height as of this encounter: 1.626 m (5' 4\").    Weight as of this encounter: 62.1 kg (137 lb).    Resp: 14   SpO2: 97 %   O2 Device: None (Room air)     Blood Pressure:   BP Readings from Last 3 Encounters:   23 101/63   23 102/63   23 90/56     T24 : Temp (24hrs), Av.7  F (36.5  C), Min:97.4  F (36.3  C), Max:98.3  F (36.8  C)       GENERAL EXAMINATION:  HEENT: PERRLA, EOMI.  Neck: Supple, No myofascial tender points.    NEUROLOGICAL EXAMINATION  Mental Status:  Patient is awake, alert, and oriented X3. Speech and language functions are normal. Short- and long-term memory are intact.      Cranial Nerves: Pupils are equal and reactive to light.Visual fields are full to confrontation. Extraocular " movements are intact. There is no nystagmus in the horizontal or vertical planes.No facial sensory deficits. No facial weakness. The tongue is midline.     Motor: Muscle tone is normal. There is no pronator drift. There is no muscle atrophy. The strength is 5/5 in upper and lower extremities bilaterally. Deep tendon reflexes are 2+ and symmetric in his biceps, triceps, knees, and ankles. Plantars are flexor. .     Sensory: Patient has normal pin prick and light touch sensation in the upper and lower extremities.     Coordination: .Finger to nose - normal.    [place] Hallpike- negative for nystagmus but patient c/o mild nausea with left Edward Hallpike position.      .  LABORATORY RESULTS      Recent Labs   Lab 11/21/23  0653 11/20/23  1352   WBC 6.4 10.1   HGB 11.4* 13.2   HCT 34.5* 39.8   MCV 93 94    263     Recent Labs   Lab 11/21/23  0653 11/20/23  1352    138   POTASSIUM 3.7 3.5   CHLORIDE 106 99   CO2 25 23   ANIONGAP 9 16*   GLC 87 138*   BUN 11.9 19.9   CR 0.61 0.57   GFRESTIMATED >90 >90   MAXIMO 8.2* 9.0   MAG  --  1.7       IMAGING RESULTS     MRI Brain 11/20/23  FINDINGS: There are a few tiny scattered focal areas of abnormal T2  signal hyperintensity in the deep and subcortical white matter of the  cerebral hemispheres bilaterally that are nonspecific with an  extensive differential including: Sequela of migraine headaches,  sequela of previous trauma, demyelinating disease (ADEM, MS),  infection (Lyme disease), and chronic small vessel ischemic disease  (found in patients with long-standing hypertension and/or diabetes).  Gray-white differentiation of the brain is otherwise within normal  limits.     The ventricles and basal cisterns are normal in configuration. There  is no midline shift. There are no extra-axial fluid collections. There  is no evidence for stroke or acute intracranial hemorrhage. There is  no abnormal contrast enhancement in the brain or its coverings.     There is no sinusitis or  mastoiditis.                                                                      IMPRESSION: Nonspecific cerebral white matter changes with  differential as above. Otherwise, normal brain MRI. No evidence for  acute intracranial pathology.    _____________________________________________________________________________    _____________________________________________________________________________          ASSESSMENT     Vertigo- Likely BPPV- left superior semicircular canal. Probability of vestibular neuronitis is very low with no noticeable nystagmus and mild severity of symptoms.        RECOMMENDATIONS   I performed modified Epley''s maneuver on left ear and taught her Guillermo- Jaylin exercise.  Discharge home.  F/unit(s) in clinic in 4-6 weeks

## 2023-11-22 NOTE — PROGRESS NOTES
11/21/23 4856   Appointment Info   Signing Clinician's Name / Credentials (PT) Lynette Grayson DPT   Quick Adds   Quick Adds Certification;Vestibular Eval   Living Environment   People in Home significant other   Current Living Arrangements house   Self-Care   Usual Activity Tolerance excellent   Current Activity Tolerance poor   Regular Exercise Yes   Fall history within last six months no   Activity/Exercise/Self-Care Comment At baseline, patient is very active with weight lifting, pilates and yoga   General Information   Onset of Illness/Injury or Date of Surgery 11/20/23   Referring Physician Ian Pritchett MD   Patient/Family Therapy Goals Statement (PT) Decrease dizziness   Pertinent History of Current Problem (include personal factors and/or comorbidities that impact the POC) Per medical chart: Danika Hyman is a 68 year old female w/PMH of GERD, osteoporosis who presents with dizziness and suspected to have BPPV   General Observations Patient reports rapid onset of severe dizziness after pilates class; patient reports standing at sink, turning to left with immediate vertigo.  Dizziness reduces in supine, is worse with any change of position.   Cognition   Orientation Status (Cognition) oriented x 4   Pain Assessment   Patient Currently in Pain No   Posture    Posture Not impaired   Range of Motion (ROM)   Range of Motion ROM is WFL   Strength (Manual Muscle Testing)   Strength (Manual Muscle Testing) strength is WFL   Bed Mobility   Comment, (Bed Mobility) Independent   Transfers   Comment, (Transfers) CGA secondary to dizziness   Gait/Stairs (Locomotion)   [place] Level (Gait) minimum assist (75% patient effort)   Distance in Feet (Gait) 5   Comment, (Gait/Stairs) Patient actively reaching out for support surfaces reporting severe dizziness in standing/ambulation   Balance   Balance Comments Patient at high fall risk secondary to severe dizziness   Oculomotor Exam   Ocular ROM Normal   Smooth Pursuit  Normal   Saccades Normal   VOR Normal   Convergence Testing Normal   Infrared Goggle Exam or Frenzel Lense Exam   Spontaneous Nystagmus Negative   Gaze Evoked Nystagmus Negative   Head Shake Horizontal Nystagmus Negative   [place]-Hallpike (Right) Negative   Edward-Hallpike (Left) Negative   Clinical Impression   Criteria for Skilled Therapeutic Intervention Yes, treatment indicated   PT Diagnosis (PT) Impaired functional mobility   Influenced by the following impairments Dizziness   Functional limitations due to impairments Difficulties with gait, transfers   Clinical Presentation (PT Evaluation Complexity) evolving   Clinical Presentation Rationale clinical judgement   Clinical Decision Making (Complexity) moderate complexity   Planned Therapy Interventions (PT) balance training;gait training;home exercise program;patient/family education;stair training;transfer training;progressive activity/exercise   Risk & Benefits of therapy have been explained evaluation/treatment results reviewed;care plan/treatment goals reviewed;risks/benefits reviewed;current/potential barriers reviewed;participants voiced agreement with care plan;participants included;patient;spouse/significant other   PT Total Evaluation Time   PT Eval, Low Complexity Minutes (83428) 25   Therapy Certification   Start of care date 11/21/23   Certification date from 11/21/23   Certification date to 11/23/23   Medical Diagnosis Dizziness   Physical Therapy Goals   PT Frequency Daily   PT Predicted Duration/Target Date for Goal Attainment 11/22/23   PT Goals Transfers;Gait;Stairs   PT: Transfers Supervision/stand-by assist;Sit to/from stand;Bed to/from chair;Assistive device   PT: Gait Supervision/stand-by assist;Assistive device;Greater than 200 feet   PT: Stairs Supervision/stand-by assist;Assistive device;4 stairs;Rail on right   PT Discharge Planning   PT Plan reassess for BPPV, HEP, facilitate mobility   PT Discharge Recommendation (DC Rec) home with  outpatient physical therapy  (OP vestibular PT)   PT Rationale for DC Rec Patient presents below baseline for functional mobility secondary to severe dizziness and nausea.  Patient was assessed for BPPV without ability to reproduce symptoms with Edward Hallpike maneuver.  No nystagmus present during session.  Patient continued to be dizziness with changes in position (primarily between supine/sitting/standing).  Patient unable to ambulate safely in order to access home environment.  Recommend medical management of symptoms, OP vestibular rehab referral for ongoing symptom management and education at discharge. Plan to return tomorrow for ongoing evaluation and treatment as indicated.   PT Brief overview of current status Ax1 with limited ambulation   Total Session Time   Total Session Time (sum of timed and untimed services) 25   [facility]  OUTPATIENT PHYSICAL THERAPY EVALUATION  PLAN OF TREATMENT FOR OUTPATIENT REHABILITATION  (COMPLETE FOR INITIAL CLAIMS ONLY)  Patient's Last Name, First Name, M.I.  YOB: 1955  Danika Hyman                        Provider's Name  [facility] Medical Record No.  [id number]                             Onset Date:  11/20/23   Start of Care Date:  11/21/23   Type:     _X_PT   ___OT   ___SLP Medical Diagnosis:  Dizziness              PT Diagnosis:  Impaired functional mobility Visits from SOC:  1     See note for plan of treatment, functional goals and certification details    I CERTIFY THE NEED FOR THESE SERVICES FURNISHED UNDER        THIS PLAN OF TREATMENT AND WHILE UNDER MY CARE     (Physician co-signature of this document indicates review and certification of the therapy plan).

## 2023-11-22 NOTE — PROGRESS NOTES
[facility]    Hospitalist Progress Note      Assessment & Plan   Danika Hyman is a 68 year old female w/PMH of GERD, osteoporosis who presents with dizziness and suspected to have BPPV     #Dizziness. Possible vestibular neuritis: She had completed a pilates workout and was washing her hands when she became acutely dizzy like the room was spinning, nauseas and diaphoretic. She sat down and EMS was called.  No headache.  She did endorse some prior URI type symptoms.  In ED was noted to have recurrent nausea and dizziness that was very sensitive to head movements and had dry heaves but no vomiting. She denies any associated headache, vision changes, focal weakness, tinnitus.   -Patient has been afebrile and hemodynamically stable.  Orthostatics negative.  Her BMP and CBC are unremarkable.  EKG with sinus rhythm without any clear ischemic changes.  Troponin negative.  MRI of the brain was done that showed a few tiny scattered focal areas of abnormal T2 hyperintensity that were nonspecific but could be related to migraines, previous trauma, demyelinating disease, infection or chronic small vessel ischemic changes.  There did not seem to be any other major acute abnormalities.  -Patient was started on meclizine, Valium and Zofran.  -Seen by physical therapy on 11/21.  Negative Edward-Hallpike.  Patient with ongoing dizziness and not able to discharge home.  -Given previous URI symptoms, some concern over possible vestibular neuritis as etiology of her dizziness.  Started prednisone for possible vestibular neuritis.  -On 11/22, patient is still having difficulty getting to the bathroom.  Has ongoing dizziness but thinks it may be getting better.  Nausea has resolved.  On exam, she does seem to have some right to left nystagmus noted.  We will consult neurology given ongoing dizziness.  -Appreciate PT consultation.       #Hx GERD, osteoporosis: resume home pepcid, multivitamins     DVT Prophylaxis:  Pneumatic Compression Devices  Dispo: At least another day.   Code Status:  full code    Ian Pritchett MD    Interval History   No events.  Patient notes nausea is resolved.  She thinks dizziness is better but still present.  She did have some difficulty getting to the bathroom today due to ongoing dizziness.  Denies any pain.  No weakness.  She notes no vision changes.    -Data reviewed today: I reviewed all new labs and imaging results over the last 24 hours. I personally reviewed     Physical Exam   Temp: 97.4  F (36.3  C) Temp src: Oral BP: 102/64 Pulse: 65   Resp: 14 SpO2: 98 % O2 Device: None (Room air)    Vitals:    11/20/23 1109 11/20/23 2352   Weight: 61.2 kg (135 lb) 62.1 kg (137 lb)     Vital Signs with Ranges  Temp:  [97.4  F (36.3  C)-99.1  F (37.3  C)] 97.4  F (36.3  C)  Pulse:  [64-77] 65  Resp:  [14-18] 14  BP: ()/(60-74) 102/64  SpO2:  [94 %-98 %] 98 %  I/O last 3 completed shifts:  In: 300 [P.O.:300]  Out: -     Constitutional: Nontoxic, NAD  HEENT: Normocephalic. MMM, No elevation of JVD noted.   Respiratory: Nl WOB, Clear bilaterally, No wheezes or crackles  Cardiovascular: Regular, no murmur  GI: BS+, NT, ND  Skin/Integumen: WWP, no rash. No edema  Neuro: CNII-XII intact. Moves all extremities with full strength intact.   strength intact.  No tremor.  She does seem to have some right to left nystagmus on exam today.    Medications      cyanocobalamin  500 mcg Oral Daily    famotidine  20 mg Oral BID    multivitamin, therapeutic  1 tablet Oral Daily    predniSONE  60 mg Oral Daily       Data   Recent Labs   Lab 11/21/23  0653 11/20/23  1352   WBC 6.4 10.1   HGB 11.4* 13.2   MCV 93 94    263    138   POTASSIUM 3.7 3.5   CHLORIDE 106 99   CO2 25 23   BUN 11.9 19.9   CR 0.61 0.57   ANIONGAP 9 16*   MAXIMO 8.2* 9.0   GLC 87 138*       No results found for this or any previous visit (from the past 24 hour(s)).

## 2023-11-22 NOTE — CARE PLAN
PRIMARY DIAGNOSIS: VERTIGO    OUTPATIENT/OBSERVATION GOALS TO BE MET BEFORE DISCHARGE  1. Orthostatic performed: Yes:          Lying Orthostatic BP: 102/60         Sitting Orthostatic BP: 104/69         Standing Orthostatic BP: 110/78     2. Completion of appropriate imaging: Yes    3. Tolerating PO medications: Yes    4. Return to near baseline physical activity: No    5. Cleared for discharge by consultants (if involved): No    Discharge Planner Nurse   Safe discharge environment identified: Yes  Barriers to discharge: Yes    Please review provider order for any additional goals.   Nurse to notify provider when observation goals have been met and patient is ready for discharge.    VSS. Denies pain. Dizziness/nausea with activity, head movement, and position change continue to persist. Patient continues to hold on to bed post, door handle, etc to ambulate to the bathroom. On prednisone. Neurology consulted.

## 2023-11-22 NOTE — UTILIZATION REVIEW
[facility] Utilization Review  Admission Status; Secondary Review Determination     Admission Date: 11/20/2023  1:33 PM      Under the authority of the Utilization Management Committee, the utilization review process indicated a secondary review on the above patient.  The review outcome is based on review of the medical records, discussions with staff, and applying clinical experience noted on the date of the review.        (X)      Inpatient Status Appropriate - This patient's medical care is consistent with medical management for inpatient care and reasonable inpatient medical practice.          RATIONALE FOR DETERMINATION   Danika Hyman is a 68 year old female with past medical history of osteoporosis, who presented with dizziness, vertigo and is registered to observation with suspected vestibular neuritis.  Initial work-up including MRI of the brain was negative for acute stroke but did show scattered focal areas of hyperintensity, possibly related to demyelinating disease or small vessel ischemic changes.  She is started on symptomatic management with meclizine, Valium and Zofran and started on physical therapy with vestibular exercises.  However, patient continues to have vertigo and dizziness despite intervention and plan for neurology consultation and further management.  In light of failed observation cares, suboptimal clinical improvement, and need for ongoing management with anticipated length of stay more than 2 midnights, it is reasonable to advance to inpatient status.  Recommendation is communicated to the primary team (Dr. Pritchett).           The definitions of Inpatient Status and Observation Status used in making the determination above are those provided in the CMS Coverage Manual, Chapter 1 and Chapter 6, section 70.4.      Sincerely,       Harmeet Schaffer MD, MS  Physician Advisor  Utilization Review-[place]    Phone: [phone]

## 2023-11-22 NOTE — CARE PLAN
PRIMARY DIAGNOSIS: VERTIGO    OUTPATIENT/OBSERVATION GOALS TO BE MET BEFORE DISCHARGE  1. Orthostatic performed: Yes:          Lying Orthostatic BP: 102/60         Sitting Orthostatic BP: 104/69         Standing Orthostatic BP: 110/78     2. Completion of appropriate imaging: Yes    3. Tolerating PO medications: Yes    4. Return to near baseline physical activity: No    5. Cleared for discharge by consultants (if involved): No    Discharge Planner Nurse   Safe discharge environment identified: Yes  Barriers to discharge: Yes       Entered by: Shea Spangler RN 11/22/2023 1:37 PM     Please review provider order for any additional goals.   Nurse to notify provider when observation goals have been met and patient is ready for discharge.

## 2023-11-23 NOTE — PLAN OF CARE
Physical Therapy Discharge Summary    Reason for therapy discharge:    Discharged to home with outpatient vestibular therapy    Progress towards therapy goal(s). See goals on Care Plan in [facility] electronic health record for goal details.  Goals partially met.  Barriers to achieving goals:   discharge from facility.    Therapy recommendation(s):    Continued therapy is recommended.  Rationale/Recommendations:  Vestibular outpatient therapy.

## 2023-11-23 NOTE — PLAN OF CARE
Discharged home with spouse assisting and transporting as needed. Belongings returned, script for new medications sent to pharmacy of choice. Pt and family did not have any questions or concerns noted upon departure.

## 2023-11-24 NOTE — COMMUNITY RESOURCES LIST (ENGLISH)
11/24/2023   [facility]  N/A  For questions about this resource list or additional care needs, please contact your primary care clinic or care manager.  Phone: [phone]   Email: N/A   Address: [facility] [place] [address]   Hours: N/A        Hotlines and Helplines       Hotline - Housing crisis  1  [facility] (Main Office) Distance: 6.86 miles      Phone/Virtual   [address] [place] [address]  Language: English  Hours: Mon - Sun Open 24 Hours   Phone: (679) 384-8210 Email: info@[facility] Website: http://[facility].Tykli     2  [facility] Distance: 12.71 miles      Phone/Virtual   [address] [place] [address]  Language: English  Hours: Mon - Sun Open 24 Hours   Phone: (654) 851-7487 Email: communications@[facility].org Website: https://[facility].org/oursaviourshousing/          Housing       Coordinated Entry access point  3  [facility] Distance: 11.01 miles      In-Person, Phone/Virtual   [address] [place] [address]  Language: English, [place]inean  Hours: Mon - Fri 8:30 AM - 4:30 PM  Fees: Free   Phone: (352) 714-9904 Email: info@[facility].org Website: https://www.[facility].org/locations/down[facility]-clinic/     4  [facility] - Housing Services Distance: 12.68 miles      In-Person   [address] [place] [address]  Language: English  Hours: Mon - Fri 9:00 AM - 5:00 PM  Fees: Free   Phone: (414) 139-2332 Email: housing@[facility].org Website: http://www.[facility].org/housing     Drop-in center or day shelter  5  [facility] Distance: 12.2 miles      In-Person   [address] [place] [address]  Language: English  Hours: Mon - Fri 9:00 AM - 4:00 PM  Fees: Free   Phone: (257) 463-9290 Email: mely@[employer].org Website: http://listeninghouse.org     6  [facility] Distance: 13.1 miles      In-Person   [address] [place] [address]  Language: English  Hours: Mon - Fri 12:00 PM - 3:00 PM  Fees: Free   Phone: (753) 501-1796 Email: Podio@[employer] Website: http://[facility].[employer]/     Housing search assistance  7  [facility] - Aging and Disability Services Distance: 7.22 miles      In-Person   [address] [place] [address]  Language: English  Hours: Mon - Fri 8:00 AM - 4:00 PM  Fees: Free, Insurance, Sliding Fee   Phone: (475) 638-1496 Email: dahiana@[facility] Website: https://www.[facility]./HealthFamily/Disabilities     8  [facility] - Rental Homes for Future Homebuyers Program Distance: 7.53 miles      Phone/Virtual   [address] [place] [address]  Language: English  Hours: Mon - Fri 8:00 AM - 4:30 PM  Fees: Free   Phone: (886) 214-6768 Email: hra@[facility] Website: https://www.[facility]/hra/[place]-housing-and-pfocxapajpgle-viqmyfaxv-wie     Shelter for families  9  [facility] Distance: 2.92 miles      In-Person   [address] [place] [address]  Language: English  Hours: Mon - Sun Open 24 Hours  Fees: Free, Sliding Fee   Phone: (277) 488-6532 Ext.1 Email: info@[facility].[employer] Website: http://www.[facility].org     Shelter for individuals  10  [facility] [employer] [facility] Distance: 4.24 miles      In-Person   [address] [place] [address]  Language: English, Malaysian  Hours: Mon - Fri 8:00 AM - 4:30 PM  Fees: Free   Phone: (301) 384-1705 Email: info@[employer].org Website: http://www.[employer].org     11  [facility] [place] [facility] [place] [facility] Distance: 11.02 miles      In-Person   [address] [place] [address]  Language: English  Hours: Mon - Sun 5:00 PM - 10:00 AM  Fees: Free, Self Pay   Phone: (178) 785-7404 Email:  info@[employer].org Website: https://www.[employer].org/locations/[facility]/          Important Numbers & Websites       Emergency Services   911  City Services   311  Poison Control   (739) 769-2480  Suicide Prevention Lifeline   (317) 388-2466 (TALK)  Child Abuse Hotline   (587) 419-1795 (4-A-Child)  Sexual Assault Hotline   (894) 162-4090 (HOPE)  National Runaway Safeline   (444) 980-6932 (RUNAWAY)  All-Options Talkline   (364) 780-6623  Substance Abuse Referral   (949) 299-4155 (HELP)

## 2023-11-29 ENCOUNTER — VIRTUAL VISIT (OUTPATIENT)
Dept: PEDIATRICS | Facility: CLINIC | Age: 68
End: 2023-11-29
Payer: MEDICARE

## 2023-11-29 DIAGNOSIS — R42 VERTIGO: Primary | ICD-10-CM

## 2023-11-29 PROCEDURE — 99214 OFFICE O/P EST MOD 30 MIN: CPT | Mod: 95 | Performed by: INTERNAL MEDICINE

## 2023-11-29 RX ORDER — MECLIZINE HYDROCHLORIDE 25 MG/1
25 TABLET ORAL 3 TIMES DAILY PRN
Qty: 40 TABLET | Refills: 1 | Status: SHIPPED | OUTPATIENT
Start: 2023-11-29 | End: 2024-02-05

## 2023-11-29 NOTE — PROGRESS NOTES
Danika is a 68 year old who is being evaluated via a billable video visit.        Assessment & Plan     (R42) Vertigo  (primary encounter diagnosis)  Comment:   Recent hospitalization for positional vertigo.  Gradual improvement since discharge.  Continue meclizine as needed.  Has continued home vestibular rehab exercises-recommended physical therapy for continued vestibular rehab.  Neurology also recommended outpatient follow-up which patient plans to schedule.  Return for follow-up in the next few weeks if symptoms fail to resolve.  Plan: meclizine (ANTIVERT) 25 MG tablet, Adult         Neurology  Referral, Physical Therapy         Referral        }   MED REC REQUIRED  Post Medication Reconciliation Status: discharge medications reconciled and changed, per note/orders      Dexter Floyd MD  [facility]    Subjective   Danika is a 68 year old, presenting for the following health issues:      [facility]       Hospital Follow-up Visit:    Hospital/Nursing Home/IP Rehab Facility: [facility]  Date of Admission: 11/20/23  Date of Discharge: 11/22/23  Reason(s) for Admission: dizziness    Was your hospitalization related to COVID-19? No   Problems taking medications regularly:  None  Medication changes since discharge: None  Problems adhering to non-medication therapy:  None    Summary of hospitalization:  [facility] discharge summary reviewed    Hospital Course  Danika Hyman is a 68 year old female w/PMH of GERD, osteoporosis who presents with dizziness and suspected to have BPPV     #Dizziness. Possible vestibular neuritis vs BPV: Presented to ED with acute dizziness started after a Pilates exercise class.  Presented with nausea and vertigo.     -work-up: Orthostatics negative.  Her BMP and CBC are unremarkable.  EKG with sinus rhythm without any clear ischemic changes.  Troponin negative.  MRI of the brain was done that showed a few tiny scattered focal  areas of abnormal T2 hyperintensity that were nonspecific but could be related to migraines, previous trauma, demyelinating disease, infection or chronic small vessel ischemic changes.   -Patient was started on meclizine, Valium and Zofran.  -Seen by physical therapy on 11/21.  question vestibular neuritis vs BPV.  She was started on prednisone for this possibility.  Neurology was consulted.  They evaluated the patient and they performed Epley maneuver and taught therapy maneuvers to the patient.  Patient noted some improvement.  She was discharged home on a course of meclizine and Zofran available as needed for symptoms.       #Hx GERD, osteoporosis: resume home pepcid, multivitamins    Diagnostic Tests/Treatments reviewed.  Follow up needed: none  Other Healthcare Providers Involved in Patient s Care:         None  Update since discharge: improved but still symptomatic.         Plan of care communicated with patient           Patient Active Problem List   Diagnosis    Gastroesophageal reflux disease without esophagitis    Status post bariatric surgery    CARDIOVASCULAR SCREENING; LDL GOAL LESS THAN 160    Osteoporosis    Personal history of drug therapy    Symptomatic menopausal or female climacteric states    Decreased libido    Vaginal atrophy    Dizziness    Vertigo     Current Outpatient Medications   Medication Sig Dispense Refill    meclizine (ANTIVERT) 25 MG tablet Take 1 tablet (25 mg) by mouth 3 times daily as needed for dizziness 40 tablet 1    CALCIUM PLUS TABS   OR Take 1 tablet by mouth 2 times daily      CENTRUM SILVER OR 1 tablet daily      Collagen-Vitamin C-Biotin (COLLAGEN 1500/C PO) Take 1 tablet by mouth daily      estradiol (ESTRACE) 0.1 MG/GM vaginal cream PLACE 1 GRAM VAGINALLY 2 TIMES EVERY WEEK 42.5 g 5    famotidine (PEPCID) 20 MG tablet Take 1 tablet (20 mg) by mouth 2 times daily 60 tablet 0    ondansetron (ZOFRAN ODT) 4 MG ODT tab Take 1 tablet (4 mg) by mouth every 8 hours as needed  for nausea 15 tablet 0    VITAMIN B-12 500 MCG OR TABS 1 sublingual qday 30 5          Review of Systems         Objective           Vitals:  No vitals were obtained today due to virtual visit.    Physical Exam   GENERAL: Healthy, alert and no distress  EYES: Eyes grossly normal to inspection.  No discharge or erythema, or obvious scleral/conjunctival abnormalities.  RESP: No audible wheeze, cough, or visible cyanosis.  No visible retractions or increased work of breathing.    SKIN: Visible skin clear. No significant rash, abnormal pigmentation or lesions.  NEURO: Cranial nerves grossly intact.  Mentation and speech appropriate for age.  PSYCH: Mentation appears normal, affect normal/bright, judgement and insight intact, normal speech and appearance well-groomed.                Video-Visit Details    Type of service:  Video Visit   Video Start Time: 7:07 AM  Video End Time:7:25 AM    Originating Location (pt. Location): Home    Distant Location (provider location):  On-site  Platform used for Video Visit: Donato

## 2023-12-15 ENCOUNTER — THERAPY VISIT (OUTPATIENT)
Dept: PHYSICAL THERAPY | Facility: CLINIC | Age: 68
End: 2023-12-15
Attending: HOSPITALIST
Payer: MEDICARE

## 2023-12-15 DIAGNOSIS — R42 VERTIGO: ICD-10-CM

## 2023-12-15 DIAGNOSIS — R42 DIZZINESS: Primary | ICD-10-CM

## 2023-12-15 PROCEDURE — 97161 PT EVAL LOW COMPLEX 20 MIN: CPT | Mod: GP

## 2023-12-15 PROCEDURE — 97112 NEUROMUSCULAR REEDUCATION: CPT | Mod: GP

## 2023-12-15 NOTE — PROGRESS NOTES
PHYSICAL THERAPY EVALUATION  Type of Visit: Evaluation    See electronic medical record for Abuse and Falls Screening details.    Subjective       Presenting condition or subjective complaint: Dizziness/ vertigo benign positional  Pt is a 69 y/o F, presenting to PT for suspected vestibular impairment. Pt with dizziness episode that required hospitalization from 11/20/2022-11/22/2023. Pt is believed to have had a BPPV vs. Vestibular Neuritis. She was evaluated by PT in the hospital and was found to have negative R/L DHPs. While looking for dizziness treatments, she was assisted in attempting an Epley CRM with no change of her symptoms. She was then given Guillermo-Darroffs to complete at home since discharge from the hospital; has been doing them x2/day - no change of symptoms. She was given a  2-day dose of prednisone to prevent hearing loss while in the hospital (then discontinued). Today, she reports no hearing changes correlating with the episode. Patient has pulled back on her typical daily exercise routines; however, is eager to return in a full capacity. She's not currently driving 2' dizziness symptoms. She feels most bothered while ambulating.     Date of onset: 11/20/23      Relevant medical history: Anemia; Dizziness; History of fractures; Menopause; Osteoporosis; Overweight; Severe dizziness; Vision problems   Past Medical History:   Diagnosis Date    Esophageal reflux     Osteoporosis 05/04/2017    DEXA:  lumbar -2.8, left hip -2.1, right hip -2.5    Status post bariatric surgery 1982    vertical banded gastrosplasty at [facility]     Dates & types of surgery: 1980 wisdom teeth, 1982 bariatric, approx 2018  cararacts  Past Surgical History:   Procedure Laterality Date    CATARACT IOL, RT/LT  09/2016    Z NONSPECIFIC PROCEDURE      wisdom teeth extraction,abstracted    Z NONSPECIFIC PROCEDURE      T&A,abstracted    Z NONSPECIFIC PROCEDURE      2 natural childbirths,abstracted    Z NONSPECIFIC PROCEDURE   1982    gastric stapling ast [facility]     Prior diagnostic imaging/testing results: MRI; CT scan; Other Ekg     Prior therapy history for the same diagnosis, illness or injury: Yes Home routine of barnes- daroff twice a day for a series of five.    Prior Level of Function  Pt IND with all transfers, ambulation, and ADLs/IADLs at baseline.     Living Environment  Social support: With a significant other or spouse   Type of home: House; Multi-level   Stairs to enter the home: Yes 7 Is there a railing: Yes   Ramp: No   Stairs inside the home: Yes 24 Is there a railing: Yes   Help at home: None; Medication and/or finances; Other  Equipment owned:   None    Employment: No    Hobbies/Interests: Walking in nature, weight lifting, yoga, pilates, traveling, reading, socializing with family and friends, part time work as a prosecutor remotely    Patient goals for therapy: Drive, not feel dizzy when I turn my head and/ or have too much visual srimuli    Pain assessment: Pain denied     Objective      Cognitive Status Examination  Pt with intact cognition and attention.    OBSERVATION: Pt ambulating unsteadily.     PALPATION: N/A    RANGE OF MOTION: LE ROM WNL  UE ROM WNL    STRENGTH: LE Strength WNL  UE Strength WNL    BED MOBILITY: Independent    TRANSFERS: Independent    WHEELCHAIR MOBILITY: N/A    GAIT:   Level of [place]: Independent  Assistive Device(s): None  Gait Deviations: Yasmine decreased  Pt with lateral LOB with head movement during ambulation; if head straight with visual focus on horizon - no impairment.  Gait Distance: >100ft during PT session  Stairs: Not attempted today.     BALANCE: Standing Balance (static):Fair  Standing Balance (dynamic):Fair  Trialed gait with HHT vs. VHT; pt with significant LOB with CGAx1 of PT to maintain upright.     SENSATION: UE Sensation WNL, LE Sensation WNL    COORDINATION: Deferred today.        VESTIBULAR EVALUATION  ADDITIONAL HISTORY:  Description of symptoms: Attacks of  dizziness  Dizzy attacks:   Start: 11/20/2023   Last attack: 12/8/2023   Frequency of occurrences: Daily   Length of attack: Several seconds  Difficulty hearing: Both ears  Noise in ears? No    Alleviates symptoms: Keeping my head still- also my hearing loss is minimal- this relates to an andwer above  Worsens symptoms: Moving my head and eyes especially  when there is a lot of visual complexity  Activities that bring on symptoms: Riding in a car; Turning while walking; Shopping at the grocery or mall     Pertinent visual history: Pt with recent hx of cataracts surgery. Does wear glasses.  Pertinent history of current vestibular problem: Migraines, Motion sickness, Reporting only one migraine headache in her life. Does endorse significant history of motion sickness on car rides as the passenger/back seat.     DHI: Total Score: 68/100 points.     Cervicogenic Screen    Neck ROM Normal   Vertebral Artery Test Normal     Oculomotor Screen    Ocular ROM Normal   Smooth Pursuit Normal; pt symptomatic.    Saccades Normal   VOR Normal; pt symptomatic.    VOR Cancellation Normal; pt symptomatic.    Head Impulse Test Not tested.    Convergence Testing Normal; <6cm.         Infrared Goggle Exam Vestibular Suppressant in Last 24 Hours? No  Exam Completed With: Infrared goggles   Spontaneous Nystagmus Horizontal L   Gaze Evoked Nystagmus Horizontal L, Mostly with right-ribeiro gaze.    Head Shake Horizontal Nystagmus Horizontal L; however, negative test 2' 2-beats only. Requires >3 beats to be a positive test.   Positional Testing Negative    Left Right   [facility]-Hallpike Negative Negative   [facility] Supine Roll Test Negative Negative        PT suspecting R UVH. However, potential for a previously resolved BPPV that caused a UVH. No sign of BPPV today.     Dynamic Visual Acuity (DVA)    Static Acuity (LogMar) 8   Horizontal Head Movement at 1 Hz (LogMar)    Horizontal Head Movement at 2 Hz (LogMar) 8   Pt very symptomatic, though no line  loss. Demonstrating typical function of VOR.      Assessment & Plan   CLINICAL IMPRESSIONS  Medical Diagnosis: vertigo    Treatment Diagnosis: R UVH; dizziness with head/body movement   Impression/Assessment: Patient is a 68 year old female with dizziness and instability complaints.  The following significant findings have been identified: Impaired balance, Impaired gait, Decreased activity tolerance, Instability, Dizziness, and Disequilibrium . These impairments interfere with their ability to perform household mobility and community mobility as compared to previous level of function.     Clinical Decision Making (Complexity):  Clinical Presentation: Stable/Uncomplicated  Clinical Presentation Rationale: based on medical and personal factors listed in PT evaluation  Clinical Decision Making (Complexity): Low complexity    PLAN OF CARE  Treatment Interventions:  Interventions: Gait Training, Manual Therapy, Neuromuscular Re-education, Therapeutic Activity, Therapeutic Exercise, Self-Care/Home Management, Canalith Repositioning    Long Term Goals     PT Goal 1  Goal Identifier: HEP  Goal Description: Pt will demonstrate IND with positional, gaze stabilization/adaptation, visual motion habituation, and static/dynamic balance exercises, to promote reduction of vestibular and dizziness symptoms for improved safety with functional mobility.  Goal Progress: initiated 12/15/2023  Target Date: 02/12/24  PT Goal 2  Goal Identifier: DHI  Goal Description: Pt will report 18pt decrease on DHI (MCID), for subjective reduction of dizziness symptoms with completion of daily activities.  Goal Progress: baseline: 68/100 points  Target Date: 02/12/24  PT Goal 3  Goal Identifier: FGA  Goal Description: Pt will score >22/30pts on FGA, to demonstrate improved dynamic balance and postural control with ambulation, and decreased risk for falling with functional mobility.  Target Date: 02/12/24  PT Goal 4  Goal Identifier: DVA  Goal  Description: Pt will demonstrate </=2 line loss on test of Dynamic Visual Acuity WHILE REMAINING ASYMPTOMATIC, demonstrating improvement in the function of the pt s VOR with dynamic head movement.  Goal Progress: baseline: 0 line loss - though quite symptomatic.  Target Date: 02/12/24      Frequency of Treatment: 1x/week  Duration of Treatment: 60 days    Education Assessment:   Learner/Method: Patient;Listening;Reading;Demonstration    Risks and benefits of evaluation/treatment have been explained.   Patient/Family/caregiver agrees with Plan of Care.     Evaluation Time:     PT Eval, Low Complexity Minutes (59248): 25     Signing Clinician: Ana Yeager PT, DPT, NCS        [facility]                                                                                   OUTPATIENT PHYSICAL THERAPY      PLAN OF TREATMENT FOR OUTPATIENT REHABILITATION   Patient's Last Name, First Name, INEZArikJANESArik  Danika Hyman  JOSETTE YOB: 1955   Provider's Name   [facility]   Medical Record No.  [id number]     Onset Date: 11/20/23  Start of Care Date: 12/15/23     Medical Diagnosis:  vertigo      PT Treatment Diagnosis:  R UVH; dizziness with head/body movement Plan of Treatment  Frequency/Duration: 1x/week/ 60 days    Certification date from 12/15/23 to 02/12/24         See note for plan of treatment details and functional goals     Ana Yeager PT, DPT, NCS                           I CERTIFY THE NEED FOR THESE SERVICES FURNISHED UNDER        THIS PLAN OF TREATMENT AND WHILE UNDER MY CARE     (Physician attestation of this document indicates review and certification of the therapy plan).              Referring Provider:  Dexter Floyd MD - Sending to pt's PCP s/p hospitalization.     Initial Assessment  See Epic Evaluation- Start of Care Date: 12/15/23

## 2023-12-22 ENCOUNTER — THERAPY VISIT (OUTPATIENT)
Dept: PHYSICAL THERAPY | Facility: CLINIC | Age: 68
End: 2023-12-22
Attending: HOSPITALIST
Payer: MEDICARE

## 2023-12-22 DIAGNOSIS — R42 DIZZINESS: Primary | ICD-10-CM

## 2023-12-22 DIAGNOSIS — R42 VERTIGO: ICD-10-CM

## 2023-12-22 PROCEDURE — 97112 NEUROMUSCULAR REEDUCATION: CPT | Mod: GP

## 2023-12-29 ENCOUNTER — THERAPY VISIT (OUTPATIENT)
Dept: PHYSICAL THERAPY | Facility: CLINIC | Age: 68
End: 2023-12-29
Attending: HOSPITALIST
Payer: MEDICARE

## 2023-12-29 DIAGNOSIS — R42 VERTIGO: ICD-10-CM

## 2023-12-29 DIAGNOSIS — R42 DIZZINESS: Primary | ICD-10-CM

## 2023-12-29 PROCEDURE — 97112 NEUROMUSCULAR REEDUCATION: CPT | Mod: GP | Performed by: PHYSICAL THERAPIST

## 2024-01-05 ENCOUNTER — THERAPY VISIT (OUTPATIENT)
Dept: PHYSICAL THERAPY | Facility: CLINIC | Age: 69
End: 2024-01-05
Payer: MEDICARE

## 2024-01-05 DIAGNOSIS — R42 DIZZINESS: Primary | ICD-10-CM

## 2024-01-05 DIAGNOSIS — R42 VERTIGO: ICD-10-CM

## 2024-01-05 PROCEDURE — 97112 NEUROMUSCULAR REEDUCATION: CPT | Mod: GP

## 2024-01-11 ENCOUNTER — THERAPY VISIT (OUTPATIENT)
Dept: PHYSICAL THERAPY | Facility: CLINIC | Age: 69
End: 2024-01-11
Payer: MEDICARE

## 2024-01-11 DIAGNOSIS — R42 VERTIGO: ICD-10-CM

## 2024-01-11 DIAGNOSIS — R42 DIZZINESS: Primary | ICD-10-CM

## 2024-01-11 PROCEDURE — 97112 NEUROMUSCULAR REEDUCATION: CPT | Mod: GP

## 2024-01-11 NOTE — PROGRESS NOTES
01/11/24 0900   Signing Clinician's Name / Credentials   Signing clinician's name / credentials Ana Yeager PT, DPT, NCS   Functional Gait Assessment (SALLY Jacob, RANI Verdugo, et al. (2004))   1. GAIT LEVEL SURFACE 3   2. CHANGE IN GAIT SPEED 3   3. GAIT WITH HORIZONTAL HEAD TURNS 2   4. GAIT WITH VERTICAL HEAD TURNS 2   5. GAIT AND PIVOT TURN 2   6. STEP OVER OBSTACLE 3   7. GAIT WITH NARROW BASE OF SUPPORT 3   8. GAIT WITH EYES CLOSED 3   9. AMBULATING BACKWARDS 2   10. STEPS 3   Total Functional Gait Assessment Score   TOTAL SCORE: (MAXIMUM SCORE 30) 26       Functional Gait Assessment (FGA): The FGA assesses postural stability during various walking tasks.   Gait assistive device used: none      Patient Score: 26/30 - Pt not at risk for falling with community ambulation. Continues to have increased lateral sway with HHT, VHT, and turn-stop. Plan to continue for HEP.    Scores of <22/30 have been correlated with predicting falls in community-dwelling older adults according to Cecil & Osvaldo 2010.   Scores of <18/30 have been correlated with increased risk for falls in patients with Parkinsons Disease according to Andres Conklin Virgen et al 2014.  Minimal Detectable Change for patients with acute/chronic stroke = 4.2 according to Thiki & Ritschel 2009  Minimal Detectable Change for patients with vestibular disorder = 8 according to Cecil & Osvaldo 2010     Assessment (rationale for performing, application to patient s function & care plan): s/p vestibular hypofunction with demonstrated balance impairments, repeated as has demonstrated improved gait, balance and overall functional mobility since initial assessment.

## 2024-01-19 ENCOUNTER — THERAPY VISIT (OUTPATIENT)
Dept: PHYSICAL THERAPY | Facility: CLINIC | Age: 69
End: 2024-01-19
Payer: MEDICARE

## 2024-01-19 DIAGNOSIS — R42 VERTIGO: ICD-10-CM

## 2024-01-19 DIAGNOSIS — R42 DIZZINESS: Primary | ICD-10-CM

## 2024-01-19 PROCEDURE — 97112 NEUROMUSCULAR REEDUCATION: CPT | Mod: GP

## 2024-01-19 NOTE — PROGRESS NOTES
01/19/24 0500   Appointment Info   Signing clinician's name / credentials Ana Yeager, PT, DPT, NCS   Total/Authorized Visits 6/6 in PT POC   Visits Used 6   Medical Diagnosis vertigo   PT Tx Diagnosis R UVH; dizziness with head/body movement   Quick Adds Certification   Progress Note/Certification   Start of Care Date 12/15/23   Onset of illness/injury or Date of Surgery 11/20/23   Therapy Frequency 1x/week   Predicted Duration 60 days   Certification date from 12/15/23   Certification date to 02/12/24   Progress Note Due Date   (at 10th)   Progress Note Completed Date 12/15/23   GOALS   PT Goals 2;3;4   PT Goal 1   Goal Identifier MET: HEP   Goal Description Pt will demonstrate IND with positional, gaze stabilization/adaptation, visual motion habituation, and static/dynamic balance exercises, to promote reduction of vestibular and dizziness symptoms for improved safety with functional mobility.   Goal Progress 1/19/2024: Pt IND with HEP.   Target Date 02/12/24   Date Met 01/19/24   PT Goal 2   Goal Identifier MET: DHI   Goal Description Pt will report 18pt decrease on DHI (MCID), for subjective reduction of dizziness symptoms with completion of daily activities.   Goal Progress 1/11/2024: 8/100 points on the DHI. She's met the MCID for reduction of her subjective dizziness.   Target Date 02/12/24   Date Met 01/11/24   PT Goal 3   Goal Identifier MET: FGA   Goal Description Pt will score >22/30pts on FGA, to demonstrate improved dynamic balance and postural control with ambulation, and decreased risk for falling with functional mobility.   Goal Progress 1/11/2024: Pt scored 26/30 on FGA; she's demonstrating reduced risk for falling with dynamic ambulation. Pt very pleased with her progress. See note attached.   Target Date 02/12/24   Date Met 01/11/24   PT Goal 4   Goal Identifier MET: DVA   Goal Description Pt will demonstrate </=2 line loss on test of Dynamic Visual Acuity WHILE REMAINING ASYMPTOMATIC,  "demonstrating improvement in the function of the pt s VOR with dynamic head movement.   Goal Progress 12/22/2023: 1 line loss - no dizziness. Typical function of the VOR.   Target Date 02/12/24   Date Met 12/22/23   Subjective Report   Subjective Report Pt arriving with her  to PT session. Reporting she continues HEP, exercise classes, and outdoor walking. Dizziness continues to improve.   Objective Measures   Objective Measures Objective Measure 1   Objective Measure 1   Objective Measure mCTSIB   Details 30s, 30s, 30s, 30s (inc sway for first 15s, then more confidence and reduced sway to follow).   Treatment Interventions (PT)   Interventions Neuromuscular Re-education   Neuromuscular Re-education   Neuromuscular re-ed of mvmt, balance, coord, kinesthetic sense, posture, proprioception minutes ([id number]) 40   Skilled Intervention gaze stabilization; dynamic gait; visual motion habituation; static/dynamic balance; HEP; pt edu; mCTSIB   Patient Response/Progress Pt completed program of visual motion habituation and static/dynamic balance activities to improve the pt's tolerance to symptomatic movements s/p UVH. She performed VOR Cx in R/L Diagonal motions with feet together on firm vs. foam surfaces for 10x1 of each, ambulation in serpentine around two cones in a figure-8 position with HHT vs. VORx1 vs. forward single visual target for 20ft x3, SLS with U LE moving in \"rainbow,\" pattern over a 8in yoga block for 10x2 on R/L, SLS on foam cushion with U LE hip abd x10 and ext x10, backward ball toss to R/L in static stance on firm vs. foam surface x10 of each condition, and mCTSIB to finish. Pt reporting her balance continues to improve. Feels most bothered by busy visual environments (ie. gym with busy classes, mirrors, and bright-lights). Provided continued pt edu for HEP of gait with HHT/VHT during her typical daily ambulation, and EC on foam surface with feet together for 30s x3. To complete ambulation with " HHT/VHT daily, and balance x3-4 times/week. Pt edu to stop HEP once all symptoms have subsided; however, if worsens resume HEP or contact PT for return to therapy. Pt/ stating support. Have updated HEP & contact card.   Education   Learner/Method Patient;Significant Other;Listening;Demonstration   Plan   Home program HEP - see PTrx and previous daily notes   Plan for next session Discharged.   Total Session Time   Timed Code Treatment Minutes 40   Total Treatment Time (sum of timed and untimed services) 40       DISCHARGE  Reason for Discharge: Patient has met all goals. She's demonstrated IND with HEP. Condition has significantly improved since her initial evaluation.    Equipment Issued: N/A    Discharge Plan: Patient to continue home program.    Referring Provider:  Ian Pritchett MD

## 2024-02-05 ENCOUNTER — OFFICE VISIT (OUTPATIENT)
Dept: PULMONOLOGY | Facility: CLINIC | Age: 69
End: 2024-02-05
Payer: MEDICARE

## 2024-02-05 VITALS
DIASTOLIC BLOOD PRESSURE: 67 MMHG | OXYGEN SATURATION: 98 % | WEIGHT: 134.5 LBS | SYSTOLIC BLOOD PRESSURE: 101 MMHG | RESPIRATION RATE: 12 BRPM | HEART RATE: 67 BPM | BODY MASS INDEX: 23.09 KG/M2

## 2024-02-05 DIAGNOSIS — J47.9 BRONCHIECTASIS WITHOUT COMPLICATION (H): Primary | ICD-10-CM

## 2024-02-05 DIAGNOSIS — R93.89 ABNORMAL CT OF THE CHEST: ICD-10-CM

## 2024-02-05 DIAGNOSIS — K21.9 GASTROESOPHAGEAL REFLUX DISEASE WITHOUT ESOPHAGITIS: ICD-10-CM

## 2024-02-05 PROCEDURE — 99215 OFFICE O/P EST HI 40 MIN: CPT | Performed by: INTERNAL MEDICINE

## 2024-02-05 ASSESSMENT — PAIN SCALES - GENERAL: PAINLEVEL: NO PAIN (0)

## 2024-02-05 NOTE — LETTER
2/5/2024         RE: Danika Hyman  [address] [place] [address]        Dear Colleague,    Thank you for referring your patient, Danika Hyman, to the [facility] [place]. Please see a copy of my visit note below.    Pulmonary Clinic Return Visit  History of Present Illness    Ms. Danika Hyman is a 68 yof with a history of acid reflux who presents to pulmonary clinic today for follow up of a focus of RML bronchiectasis.  To briefly review, we last visited in August following a cardiac CT which had revealed a foci of bronchiectasis in the RML.  PFTs were normal and she was asymptomatic at the time of pulmonary evaluation.  A plan was made to repeat CT in 6 months and to follow symptoms.      She returns to clinic today for follow up:  -Breathing is stable.  No new or worsening shortness of breath.  -Was in hospital a few days around Thanksgiving with some dizziness/dysequilibrium.  Went to PT for this and it has been improving.  -Still experiencing some dysequilibrium and nausea which has limited her ability to exercise just a little bit.  -No significant cough, although her  notes she has been coughing a little bit more in the morning upon waking up for the last 1.5 years.  This cough is not productive.  -No pneumonias.  -No hemoptysis.  -Does have a history of acid reflux for which she takes pepcid.      -Never smoker.      Review of Systems:  10 of 14 systems reviewed and are negative unless otherwise stated in HPI.    Past Medical History:   Diagnosis Date    Esophageal reflux     Osteoporosis 05/04/2017    DEXA:  lumbar -2.8, left hip -2.1, right hip -2.5    Status post bariatric surgery 1982    vertical banded gastrosplasty at [facility]       Allergies   Allergen Reactions    Morphine And Related      Vomiting         Current Outpatient Medications:     CALCIUM PLUS TABS   OR, Take 1 tablet by mouth 2 times daily, Disp: , Rfl:     CENTRUM SILVER OR, 1 tablet daily, Disp: ,  Rfl:     Collagen-Vitamin C-Biotin (COLLAGEN 1500/C PO), Take 1 tablet by mouth daily, Disp: , Rfl:     estradiol (ESTRACE) 0.1 MG/GM vaginal cream, PLACE 1 GRAM VAGINALLY 2 TIMES EVERY WEEK, Disp: 42.5 g, Rfl: 5    famotidine (PEPCID) 20 MG tablet, Take 1 tablet (20 mg) by mouth 2 times daily, Disp: 60 tablet, Rfl: 0    meclizine (ANTIVERT) 25 MG tablet, Take 1 tablet (25 mg) by mouth 3 times daily as needed for dizziness, Disp: 40 tablet, Rfl: 1    VITAMIN B-12 500 MCG OR TABS, 1 sublingual qday, Disp: 30, Rfl: 5      Physical Exam:  /67 (BP Location: Left arm, Patient Position: Sitting, Cuff Size: Adult Regular)   Pulse 67   Resp 12   Wt 61 kg (134 lb 8 oz)   LMP 04/17/2009   SpO2 98%   BMI 23.09 kg/m    GENERAL: Well developed, well nourished, alert, and in no apparent distress.  HEENT: Normocephalic, atraumatic. PERRL, EOMI. Oral mucosa is moist. No perioral cyanosis.  NECK: supple, no obvious masses.  RESP:  Normal respiratory effort.  CTAB.  No rales, wheezes, rhonchi.  No cyanosis or clubbing.  CV: Normal S1, S2, regular rhythm, normal rate. No murmur.  No LE edema.   ABDOMEN: non-distended.   SKIN: warm and dry. No rash.  NEURO: AAOx3.  Normal gait.  Fluent speech.  PSYCH: mentation appears normal.     Results (personally reviewed in clinic today):  None.      Assessment and Plan:   Danika Hyman is a 68 year old female with a history of GERD who presents to clinic for further evaluation of bronchiectasis.  Bronchiectasis. Normal PFTs.  Small focus of mild bronchiectasis seen on coronary CT in the RML.  She continues to be without any chronic respiratory symptoms which is reassuring.  Additionally denies any history of recurrent pneumonias.  Suspect this focus of bronchiectasis could be related to scarring from previous episodes of pneumonia or acid reflux.  Given focal nature and very mild findings, very low suspicion at this time for idiopathic bronchiectasis, ABPA or HERNAN.  Given absence of  chronic respiratory symptoms, there is no indication for airway clearance measures.  Will proceed with repeat CT chest at this time to ensure no progression of findings.  GERD.  Continue Famotidine as previously prescribed.  Lifestyle modifications to reduce acid reflux were reviewed.  Questions and concerns were answered to the patient's satisfaction.  she was provided with my contact information should new questions or concerns arise in the interim.  Return to clinic to be determined pending results of CT chest.  If stable, no need for ongoing follow up.  Up to date on PCV 20 (2022) and seasonal flu vaccine.  Lorene Hunter MD  Pulmonary and Critical Care Medicine    The above note was dictated using voice recognition software and may include typographical errors. Please contact the author for any clarifications.  I spent 45 minutes on the date of encounter doing chart review, review of outside records, review of test results, conducting the patient visit, completing documentation and further activities as noted above.

## 2024-02-05 NOTE — PATIENT INSTRUCTIONS
-Call number provided to schedule CT chest.  We will be in touch regarding results and can determine any further follow up, if necessary at that time.

## 2024-02-05 NOTE — NURSING NOTE
Chief Complaint   Patient presents with    RECHECK     Bronchiectasis without complication (H) +1 more       Vitals:    02/05/24 1335   BP: 101/67   BP Location: Left arm   Patient Position: Sitting   Cuff Size: Adult Regular   Pulse: 67   Resp: 12   SpO2: 98%   Weight: 61 kg (134 lb 8 oz)       Body mass index is 23.09 kg/m .    Doug Hanson MA

## 2024-02-05 NOTE — PROGRESS NOTES
Pulmonary Clinic Return Visit  History of Present Illness    Ms. Danika Hyman is a 68 yof with a history of acid reflux who presents to pulmonary clinic today for follow up of a focus of RML bronchiectasis.  To briefly review, we last visited in August following a cardiac CT which had revealed a foci of bronchiectasis in the RML.  PFTs were normal and she was asymptomatic at the time of pulmonary evaluation.  A plan was made to repeat CT in 6 months and to follow symptoms.      She returns to clinic today for follow up:  -Breathing is stable.  No new or worsening shortness of breath.  -Was in hospital a few days around Thanksgiving with some dizziness/dysequilibrium.  Went to PT for this and it has been improving.  -Still experiencing some dysequilibrium and nausea which has limited her ability to exercise just a little bit.  -No significant cough, although her  notes she has been coughing a little bit more in the morning upon waking up for the last 1.5 years.  This cough is not productive.  -No pneumonias.  -No hemoptysis.  -Does have a history of acid reflux for which she takes pepcid.      -Never smoker.      Review of Systems:  10 of 14 systems reviewed and are negative unless otherwise stated in HPI.    Past Medical History:   Diagnosis Date    Esophageal reflux     Osteoporosis 05/04/2017    DEXA:  lumbar -2.8, left hip -2.1, right hip -2.5    Status post bariatric surgery 1982    vertical banded gastrosplasty at [facility]       Allergies   Allergen Reactions    Morphine And Related      Vomiting         Current Outpatient Medications:     CALCIUM PLUS TABS   OR, Take 1 tablet by mouth 2 times daily, Disp: , Rfl:     CENTRUM SILVER OR, 1 tablet daily, Disp: , Rfl:     Collagen-Vitamin C-Biotin (COLLAGEN 1500/C PO), Take 1 tablet by mouth daily, Disp: , Rfl:     estradiol (ESTRACE) 0.1 MG/GM vaginal cream, PLACE 1 GRAM VAGINALLY 2 TIMES EVERY WEEK, Disp: 42.5 g, Rfl: 5    famotidine (PEPCID) 20 MG tablet,  Take 1 tablet (20 mg) by mouth 2 times daily, Disp: 60 tablet, Rfl: 0    meclizine (ANTIVERT) 25 MG tablet, Take 1 tablet (25 mg) by mouth 3 times daily as needed for dizziness, Disp: 40 tablet, Rfl: 1    VITAMIN B-12 500 MCG OR TABS, 1 sublingual qday, Disp: 30, Rfl: 5      Physical Exam:  /67 (BP Location: Left arm, Patient Position: Sitting, Cuff Size: Adult Regular)   Pulse 67   Resp 12   Wt 61 kg (134 lb 8 oz)   LMP 04/17/2009   SpO2 98%   BMI 23.09 kg/m    GENERAL: Well developed, well nourished, alert, and in no apparent distress.  HEENT: Normocephalic, atraumatic. PERRL, EOMI. Oral mucosa is moist. No perioral cyanosis.  NECK: supple, no obvious masses.  RESP:  Normal respiratory effort.  CTAB.  No rales, wheezes, rhonchi.  No cyanosis or clubbing.  CV: Normal S1, S2, regular rhythm, normal rate. No murmur.  No LE edema.   ABDOMEN: non-distended.   SKIN: warm and dry. No rash.  NEURO: AAOx3.  Normal gait.  Fluent speech.  PSYCH: mentation appears normal.     Results (personally reviewed in clinic today):  None.      Assessment and Plan:   Danika Hyman is a 68 year old female with a history of GERD who presents to clinic for further evaluation of bronchiectasis.  Bronchiectasis. Normal PFTs.  Small focus of mild bronchiectasis seen on coronary CT in the RML.  She continues to be without any chronic respiratory symptoms which is reassuring.  Additionally denies any history of recurrent pneumonias.  Suspect this focus of bronchiectasis could be related to scarring from previous episodes of pneumonia or acid reflux.  Given focal nature and very mild findings, very low suspicion at this time for idiopathic bronchiectasis, ABPA or HERNAN.  Given absence of chronic respiratory symptoms, there is no indication for airway clearance measures.  Will proceed with repeat CT chest at this time to ensure no progression of findings.  GERD.  Continue Famotidine as previously prescribed.  Lifestyle modifications to  reduce acid reflux were reviewed.  Questions and concerns were answered to the patient's satisfaction.  she was provided with my contact information should new questions or concerns arise in the interim.  Return to clinic to be determined pending results of CT chest.  If stable, no need for ongoing follow up.  Up to date on PCV 20 (2022) and seasonal flu vaccine.    Lorene Hunter MD  Pulmonary and Critical Care Medicine    The above note was dictated using voice recognition software and may include typographical errors. Please contact the author for any clarifications.  I spent 45 minutes on the date of encounter doing chart review, review of outside records, review of test results, conducting the patient visit, completing documentation and further activities as noted above.    CT Results 2/14:  IMPRESSION:  Mild bronchiectasis in the lung bases with a few scattered foci of mucus plugging, similar to previous.  Swapper [employer]et message sent to patient with CT results - no need for further work up or ongoing follow up at this time.

## 2024-02-14 ENCOUNTER — HOSPITAL ENCOUNTER (OUTPATIENT)
Dept: CT IMAGING | Facility: CLINIC | Age: 69
Discharge: HOME | End: 2024-02-14
Attending: INTERNAL MEDICINE | Admitting: INTERNAL MEDICINE
Payer: MEDICARE

## 2024-02-14 DIAGNOSIS — R93.89 ABNORMAL CT OF THE CHEST: ICD-10-CM

## 2024-02-14 DIAGNOSIS — J47.9 BRONCHIECTASIS WITHOUT COMPLICATION (H): ICD-10-CM

## 2024-02-14 PROCEDURE — 71250 CT THORAX DX C-: CPT | Mod: MG

## 2024-04-18 SDOH — HEALTH STABILITY: PHYSICAL HEALTH: ON AVERAGE, HOW MANY DAYS PER WEEK DO YOU ENGAGE IN MODERATE TO STRENUOUS EXERCISE (LIKE A BRISK WALK)?: 5 DAYS

## 2024-04-18 SDOH — HEALTH STABILITY: PHYSICAL HEALTH: ON AVERAGE, HOW MANY MINUTES DO YOU ENGAGE IN EXERCISE AT THIS LEVEL?: 60 MIN

## 2024-04-18 ASSESSMENT — SOCIAL DETERMINANTS OF HEALTH (SDOH): HOW OFTEN DO YOU GET TOGETHER WITH FRIENDS OR RELATIVES?: MORE THAN THREE TIMES A WEEK

## 2024-04-25 ENCOUNTER — OFFICE VISIT (OUTPATIENT)
Dept: PEDIATRICS | Facility: CLINIC | Age: 69
End: 2024-04-25
Payer: MEDICARE

## 2024-04-25 VITALS
OXYGEN SATURATION: 100 % | HEIGHT: 63 IN | RESPIRATION RATE: 16 BRPM | HEART RATE: 62 BPM | DIASTOLIC BLOOD PRESSURE: 70 MMHG | BODY MASS INDEX: 24.13 KG/M2 | WEIGHT: 136.2 LBS | TEMPERATURE: 96.9 F | SYSTOLIC BLOOD PRESSURE: 100 MMHG

## 2024-04-25 DIAGNOSIS — Z13.6 CARDIOVASCULAR SCREENING; LDL GOAL LESS THAN 160: ICD-10-CM

## 2024-04-25 DIAGNOSIS — R42 VERTIGO: ICD-10-CM

## 2024-04-25 DIAGNOSIS — N95.2 VAGINAL ATROPHY: ICD-10-CM

## 2024-04-25 DIAGNOSIS — M81.0 OSTEOPOROSIS, UNSPECIFIED OSTEOPOROSIS TYPE, UNSPECIFIED PATHOLOGICAL FRACTURE PRESENCE: ICD-10-CM

## 2024-04-25 DIAGNOSIS — K21.9 GASTROESOPHAGEAL REFLUX DISEASE WITHOUT ESOPHAGITIS: ICD-10-CM

## 2024-04-25 DIAGNOSIS — Z00.00 ENCOUNTER FOR MEDICARE ANNUAL WELLNESS EXAM: Primary | ICD-10-CM

## 2024-04-25 PROBLEM — Z92.29 PERSONAL HISTORY OF DRUG THERAPY: Status: RESOLVED | Noted: 2017-06-20 | Resolved: 2024-04-25

## 2024-04-25 PROCEDURE — 82306 VITAMIN D 25 HYDROXY: CPT | Performed by: INTERNAL MEDICINE

## 2024-04-25 PROCEDURE — 36415 COLL VENOUS BLD VENIPUNCTURE: CPT | Performed by: INTERNAL MEDICINE

## 2024-04-25 PROCEDURE — 80061 LIPID PANEL: CPT | Performed by: INTERNAL MEDICINE

## 2024-04-25 PROCEDURE — 80053 COMPREHEN METABOLIC PANEL: CPT | Performed by: INTERNAL MEDICINE

## 2024-04-25 PROCEDURE — G0402 INITIAL PREVENTIVE EXAM: HCPCS | Performed by: INTERNAL MEDICINE

## 2024-04-25 PROCEDURE — 99214 OFFICE O/P EST MOD 30 MIN: CPT | Mod: 25 | Performed by: INTERNAL MEDICINE

## 2024-04-25 RX ORDER — ESTRADIOL 0.1 MG/G
CREAM VAGINAL
Qty: 42.5 G | Refills: 5 | Status: SHIPPED | OUTPATIENT
Start: 2024-04-25

## 2024-04-25 RX ORDER — FAMOTIDINE 20 MG/1
20 TABLET, FILM COATED ORAL 2 TIMES DAILY
Qty: 60 TABLET | Refills: 0 | Status: CANCELLED | OUTPATIENT
Start: 2024-04-25

## 2024-04-25 ASSESSMENT — PAIN SCALES - GENERAL: PAINLEVEL: NO PAIN (0)

## 2024-04-25 NOTE — PATIENT INSTRUCTIONS
Preventive Care Advice   This is general advice given by our system to help you stay healthy. However, your care team may have specific advice just for you. Please talk to your care team about your preventive care needs.  Nutrition  Eat 5 or more servings of fruits and vegetables each day.  Try wheat bread, brown rice and whole grain pasta (instead of white bread, rice, and pasta).  Get enough calcium and vitamin D. Check the label on foods and aim for 100% of the RDA (recommended daily allowance).  Lifestyle  Exercise at least 150 minutes each week   (30 minutes a day, 5 days a week).  Do muscle strengthening activities 2 days a week. These help control your weight and prevent disease.  No smoking.  Wear sunscreen to prevent skin cancer.  Have a dental exam and cleaning every 6 months.  Yearly exams  See your health care team every year to talk about:  Any changes in your health.  Any medicines your care team has prescribed.  Preventive care, family planning, and ways to prevent chronic diseases.  Shots (vaccines)   HPV shots (up to age 26), if you've never had them before.  Hepatitis B shots (up to age 59), if you've never had them before.  COVID-19 shot: Get this shot when it's due.  Flu shot: Get a flu shot every year.  Tetanus shot: Get a tetanus shot every 10 years.  Pneumococcal, hepatitis A, and RSV shots: Ask your care team if you need these based on your risk.  Shingles shot (for age 50 and up).  General health tests  Diabetes screening:  Starting at age 35, Get screened for diabetes at least every 3 years.  If you are younger than age 35, ask your care team if you should be screened for diabetes.  Cholesterol test: At age 39, start having a cholesterol test every 5 years, or more often if advised.  Bone density scan (DEXA): At age 50, ask your care team if you should have this scan for osteoporosis (brittle bones).  Hepatitis C: Get tested at least once in your life.  STIs (sexually transmitted  infections)  Before age 24: Ask your care team if you should be screened for STIs.  After age 24: Get screened for STIs if you're at risk. You are at risk for STIs (including HIV) if:  You are sexually active with more than one person.  You don't use condoms every time.  You or a partner was diagnosed with a sexually transmitted infection.  If you are at risk for HIV, ask about PrEP medicine to prevent HIV.  Get tested for HIV at least once in your life, whether you are at risk for HIV or not.  Cancer screening tests  Cervical cancer screening: If you have a cervix, begin getting regular cervical cancer screening tests at age 21. Most people who have regular screenings with normal results can stop after age 65. Talk about this with your provider.  Breast cancer scan (mammogram): If you've ever had breasts, begin having regular mammograms starting at age 40. This is a scan to check for breast cancer.  Colon cancer screening: It is important to start screening for colon cancer at age 45.  Have a colonoscopy test every 10 years (or more often if you're at risk) Or, ask your provider about stool tests like a FIT test every year or Cologuard test every 3 years.  To learn more about your testing options, visit: [email or web address]://[email or web address]/377711.pdf.  For help making a decision, visit: https://bit.ly/ml99352.  Prostate cancer screening test: If you have a prostate and are age 55 to 69, ask your provider if you would benefit from a yearly prostate cancer screening test.  Lung cancer screening: If you are a current or former smoker age 50 to 80, ask your care team if ongoing lung cancer screenings are right for you.  For informational purposes only. Not to replace the advice of your health care provider. Copyright   2023 [employer]. All rights reserved. Clinically reviewed by the [facility] Transitions Program. [employer] 619630 - REV 01/24.

## 2024-04-25 NOTE — PROGRESS NOTES
Preventive Care Visit  [facility] CALLUM Floyd MD, Internal Medicine - Pediatrics  Apr 25, 2024      Assessment & Plan     (Z00.00) Encounter for Medicare annual wellness exam  (primary encounter diagnosis)    (K21.9) Gastroesophageal reflux disease without esophagitis  Comment:   Plan: well controlled, famotidine prn    (R42) Vertigo  Comment:   Plan: resolving after several months of vestibular rehab    (M81.0) Osteoporosis, unspecified osteoporosis type, unspecified pathological fracture presence  Comment:   upcoming visit with endocrine regarding Reclast.  Check vitD  Plan: Vitamin D Deficiency          (N95.2) Vaginal atrophy  Comment: refill  Plan: estradiol (ESTRACE) 0.1 MG/GM vaginal cream         (Z13.6) CARDIOVASCULAR SCREENING; LDL GOAL LESS THAN 160  Comment:   Plan: Lipid panel reflex to direct LDL Fasting,         Comprehensive metabolic panel (BMP + Alb, Alk         Phos, ALT, AST, Total. Bili, TP)              Counseling  Appropriate preventive services were discussed with this patient, including applicable screening as appropriate for fall prevention, nutrition, physical activity, Tobacco-use cessation, weight loss and cognition.  Checklist reviewing preventive services available has been given to the patient.  Reviewed patient's diet, addressing concerns and/or questions.   She is at risk for psychosocial distress and has been provided with information to reduce risk.   Patient reported safety concerns were addressed today.The patient was provided with written information regarding signs of hearing loss.           Raymond Garnica is a 69 year old, presenting for the following:  Physical        4/25/2024     9:17 AM   Additional Questions   Roomed by Rosaura UMANA   Accompanied by Spouse Alancox         4/25/2024     9:17 AM   Patient Reported Additional Medications   Patient reports taking the following new medications No         Health Care Directive  Patient has a Health  Care Directive on file  Advance care planning document is on file but is outdated.  Patient encouraged to updated.    HPI              4/18/2024   General Health   How would you rate your overall physical health? Good   Feel stress (tense, anxious, or unable to sleep) Only a little   (!) STRESS CONCERN      4/18/2024   Nutrition   Diet: Carbohydrate counting         4/18/2024   Exercise   Days per week of moderate/strenous exercise 5 days   Average minutes spent exercising at this level 60 min         4/18/2024   Social Factors   Frequency of gathering with friends or relatives More than three times a week   Worry food won't last until get money to buy more No   Food not last or not have enough money for food? No   Do you have housing?  Yes   Are you worried about losing your housing? No   Lack of transportation? No   Unable to get utilities (heat,electricity)? No         4/24/2024   Fall Risk   Fallen 2 or more times in the past year? No   Trouble with walking or balance? No          4/18/2024   Activities of Daily Living- Home Safety   Needs help with the following daily activites None of the above   Safety concerns in the home No grab bars in the bathroom         4/18/2024   Dental   Dentist two times every year? Yes         4/18/2024   Hearing Screening   Hearing concerns? (!) I FEEL THAT PEOPLE ARE MUMBLING OR NOT SPEAKING CLEARLY.    (!) IT'S HARD TO FOLLOW A CONVERSATION IN A NOISY RESTAURANT OR CROWDED ROOM.         4/18/2024   Driving Risk Screening   Patient/family members have concerns about driving No         4/18/2024   General Alertness/Fatigue Screening   Have you been more tired than usual lately? No         4/18/2024   Urinary Incontinence Screening   Bothered by leaking urine in past 6 months No         4/18/2024   TB Screening   Were you born outside of the [place]? No         Today's PHQ-2 Score:       4/24/2024    11:17 AM   PHQ-2 ( 1999 [employer])   Q1: Little interest or pleasure in doing things 0    Q2: Feeling down, depressed or hopeless 0   PHQ-2 Score 0   Q1: Little interest or pleasure in doing things Not at all   Q2: Feeling down, depressed or hopeless Not at all   PHQ-2 Score 0           4/18/2024   Substance Use   Alcohol more than 3/day or more than 7/wk No   Do you have a current opioid prescription? No   How severe/bad is pain from 1 to 10? 1/10   Do you use any other substances recreationally? No     Social History     Tobacco Use    Smoking status: Never    Smokeless tobacco: Never   Substance Use Topics    Alcohol use: Yes     Comment: 4 glasses a week    Drug use: No           7/6/2022   LAST FHS-7 RESULTS   1st degree relative breast or ovarian cancer No   Any relative bilateral breast cancer No   Any male have breast cancer No   Any ONE woman have BOTH breast AND ovarian cancer No   Any woman with breast cancer before 50yrs No   2 or more relatives with breast AND/OR ovarian cancer No   2 or more relatives with breast AND/OR bowel cancer No            ASCVD Risk   The 10-year ASCVD risk score (Sim MARQUEZ, et al., 2019) is: 3.2%    Values used to calculate the score:      Age: 69 years      Sex: Female      Is Non- : No      Diabetic: No      Tobacco smoker: No      Systolic Blood Pressure: 100 mmHg      Is BP treated: No      HDL Cholesterol: 91 mg/dL      Total Cholesterol: 151 mg/dL            Reviewed and updated as needed this visit by Provider                    Patient Active Problem List   Diagnosis    Gastroesophageal reflux disease without esophagitis    Status post bariatric surgery    CARDIOVASCULAR SCREENING; LDL GOAL LESS THAN 160    Osteoporosis    Symptomatic menopausal or female climacteric states    Decreased libido    Vaginal atrophy    Vertigo     Past Surgical History:   Procedure Laterality Date    CATARACT IOL, RT/LT  09/2016    [facility] NONSPECIFIC PROCEDURE      wisdom teeth extraction,abstracted    [facility] NONSPECIFIC PROCEDURE       T&A,abstracted    [facility] NONSPECIFIC PROCEDURE      2 natural childbirths,abstracted    [facility] NONSPECIFIC PROCEDURE  1982    gastric stapling ast [facility]       Social History     Tobacco Use    Smoking status: Never    Smokeless tobacco: Never   Substance Use Topics    Alcohol use: Yes     Comment: 4 glasses a week     Family History   Problem Relation Age of Onset    Arthritis Father         B:      Diabetes Father     Respiratory Father     Cardiovascular Father     Hypertension Mother     Alcohol/Drug Mother         B: alive    Lung Cancer Mother 89        lung cancer ;  age 91    Family History Negative Sister     Family History Negative Sister     Family History Negative Brother          Current Outpatient Medications   Medication Sig Dispense Refill    CALCIUM PLUS TABS   OR Take 1 tablet by mouth 2 times daily      CENTRUM SILVER OR 1 tablet daily      Collagen-Vitamin C-Biotin (COLLAGEN 1500/C PO) Take 1 tablet by mouth daily      estradiol (ESTRACE) 0.1 MG/GM vaginal cream PLACE 1 GRAM VAGINALLY 2 TIMES EVERY WEEK 42.5 g 5    famotidine (PEPCID) 20 MG tablet Take 1 tablet (20 mg) by mouth 2 times daily 60 tablet 0    VITAMIN B-12 500 MCG OR TABS 1 sublingual qday 30 5     Current providers sharing in care for this patient include:  Patient Care Team:  Dexter Floyd MD as PCP - General (Internal Medicine)  Fe Reyna MD as Assigned Endocrinology Provider  Tessa Crowe MD as Assigned OBGYN Provider  Gena Hunter MD as MD (Pulmonary Disease)  Ashley Dudley APRN CNP as Assigned PCP  Gena Hunter MD as Assigned Pulmonology Provider  Nguyễn Boland MD as MD (Neurology)    The following health maintenance items are reviewed in Epic and correct as of today:  Health Maintenance   Topic Date Due    ANNUAL REVIEW OF  ORDERS  Never done    RSV VACCINE (Pregnancy & 60+) (1 - 1-dose 60+ series) Never done    COVID-19 Vaccine (2023-24 season) 2024     "MEDICARE ANNUAL WELLNESS VISIT  04/14/2024    MAMMO SCREENING  07/18/2024    FALL RISK ASSESSMENT  04/25/2025    DTAP/TDAP/TD IMMUNIZATION (2 - Td or Tdap) 06/20/2026    GLUCOSE  11/21/2026    COLORECTAL CANCER SCREENING  07/31/2027    LIPID  04/14/2028    ADVANCE CARE PLANNING  04/14/2028    DEXA  07/06/2037    HEPATITIS C SCREENING  Completed    PHQ-2 (once per calendar year)  Completed    INFLUENZA VACCINE  Completed    Pneumococcal Vaccine: 65+ Years  Completed    ZOSTER IMMUNIZATION  Completed    IPV IMMUNIZATION  Aged Out    HPV IMMUNIZATION  Aged Out    MENINGITIS IMMUNIZATION  Aged Out    RSV MONOCLONAL ANTIBODY  Aged Out    PAP  Discontinued         Review of Systems  Constitutional, neuro, ENT, endocrine, pulmonary, cardiac, gastrointestinal, genitourinary, musculoskeletal, integument and psychiatric systems are negative, except as otherwise noted.     Objective    Exam  /70 (BP Location: Right arm, Patient Position: Sitting, Cuff Size: Adult Regular)   Pulse 62   Temp 96.9  F (36.1  C) (Tympanic)   Resp 16   Ht 1.61 m (5' 3.39\")   Wt 61.8 kg (136 lb 3.2 oz)   LMP 04/17/2009   SpO2 100%   BMI 23.83 kg/m     Estimated body mass index is 23.83 kg/m  as calculated from the following:    Height as of this encounter: 1.61 m (5' 3.39\").    Weight as of this encounter: 61.8 kg (136 lb 3.2 oz).    Physical Exam  GENERAL: alert and no distress  EYES: Eyes grossly normal to inspection, PERRL and conjunctivae and sclerae normal  HENT: ear canals and TM's normal, nose and mouth without ulcers or lesions  NECK: no adenopathy, no asymmetry, masses, or scars  RESP: lungs clear to auscultation - no rales, rhonchi or wheezes  CV: regular rate and rhythm, normal S1 S2, no S3 or S4, no murmur, click or rub, no peripheral edema  ABDOMEN: soft, nontender, no hepatosplenomegaly, no masses and bowel sounds normal  MS: no gross musculoskeletal defects noted, no edema  SKIN: no suspicious lesions or rashes  NEURO: " Normal strength and tone, mentation intact and speech normal  PSYCH: mentation appears normal, affect normal/bright        4/25/2024   Mini Cog   Clock Draw Score 2 Normal   3 Item Recall 3 objects recalled   Mini Cog Total Score 5              Signed Electronically by: Dexter Floyd MD

## 2024-04-26 LAB
ALBUMIN SERPL BCG-MCNC: 4.1 G/DL (ref 3.5–5.2)
ALP SERPL-CCNC: 49 U/L (ref 40–150)
ALT SERPL W P-5'-P-CCNC: 25 U/L (ref 0–50)
ANION GAP SERPL CALCULATED.3IONS-SCNC: 10 MMOL/L (ref 7–15)
AST SERPL W P-5'-P-CCNC: 32 U/L (ref 0–45)
BILIRUB SERPL-MCNC: 0.5 MG/DL
BUN SERPL-MCNC: 22 MG/DL (ref 8–23)
CALCIUM SERPL-MCNC: 9.5 MG/DL (ref 8.8–10.2)
CHLORIDE SERPL-SCNC: 105 MMOL/L (ref 98–107)
CHOLEST SERPL-MCNC: 151 MG/DL
CREAT SERPL-MCNC: 0.72 MG/DL (ref 0.51–0.95)
DEPRECATED HCO3 PLAS-SCNC: 27 MMOL/L (ref 22–29)
EGFRCR SERPLBLD CKD-EPI 2021: >90 ML/MIN/1.73M2
FASTING STATUS PATIENT QL REPORTED: YES
GLUCOSE SERPL-MCNC: 78 MG/DL (ref 70–99)
HDLC SERPL-MCNC: 94 MG/DL
LDLC SERPL CALC-MCNC: 50 MG/DL
NONHDLC SERPL-MCNC: 57 MG/DL
POTASSIUM SERPL-SCNC: 4.4 MMOL/L (ref 3.4–5.3)
PROT SERPL-MCNC: 6.5 G/DL (ref 6.4–8.3)
SODIUM SERPL-SCNC: 142 MMOL/L (ref 135–145)
TRIGL SERPL-MCNC: 37 MG/DL
VIT D+METAB SERPL-MCNC: 42 NG/ML (ref 20–50)

## 2024-07-08 ENCOUNTER — ANCILLARY PROCEDURE (OUTPATIENT)
Dept: BONE DENSITY | Facility: CLINIC | Age: 69
End: 2024-07-08
Attending: INTERNAL MEDICINE
Payer: MEDICARE

## 2024-07-08 DIAGNOSIS — M81.0 OSTEOPOROSIS, UNSPECIFIED OSTEOPOROSIS TYPE, UNSPECIFIED PATHOLOGICAL FRACTURE PRESENCE: ICD-10-CM

## 2024-07-08 PROCEDURE — 77080 DXA BONE DENSITY AXIAL: CPT | Mod: TC | Performed by: PHYSICIAN ASSISTANT

## 2024-07-19 ENCOUNTER — HOSPITAL ENCOUNTER (OUTPATIENT)
Dept: MAMMOGRAPHY | Facility: CLINIC | Age: 69
Discharge: HOME | End: 2024-07-19
Attending: INTERNAL MEDICINE | Admitting: INTERNAL MEDICINE
Payer: MEDICARE

## 2024-07-19 DIAGNOSIS — Z12.31 VISIT FOR SCREENING MAMMOGRAM: ICD-10-CM

## 2024-07-19 PROCEDURE — 77063 BREAST TOMOSYNTHESIS BI: CPT

## 2024-07-23 ENCOUNTER — VIRTUAL VISIT (OUTPATIENT)
Dept: ENDOCRINOLOGY | Facility: CLINIC | Age: 69
End: 2024-07-23
Payer: MEDICARE

## 2024-07-23 DIAGNOSIS — Z92.29 PERSONAL HISTORY OF OTHER DRUG THERAPY: ICD-10-CM

## 2024-07-23 DIAGNOSIS — Z98.84 STATUS POST BARIATRIC SURGERY: ICD-10-CM

## 2024-07-23 DIAGNOSIS — M81.0 OSTEOPOROSIS, UNSPECIFIED OSTEOPOROSIS TYPE, UNSPECIFIED PATHOLOGICAL FRACTURE PRESENCE: Primary | ICD-10-CM

## 2024-07-23 DIAGNOSIS — K21.9 GASTROESOPHAGEAL REFLUX DISEASE WITHOUT ESOPHAGITIS: ICD-10-CM

## 2024-07-23 DIAGNOSIS — Z78.0 ASYMPTOMATIC MENOPAUSAL STATE: ICD-10-CM

## 2024-07-23 DIAGNOSIS — Z92.29 HISTORY OF BISPHOSPHONATE THERAPY: ICD-10-CM

## 2024-07-23 DIAGNOSIS — M81.0 AGE-RELATED OSTEOPOROSIS WITHOUT CURRENT PATHOLOGICAL FRACTURE: ICD-10-CM

## 2024-07-23 PROCEDURE — G2211 COMPLEX E/M VISIT ADD ON: HCPCS | Mod: 95 | Performed by: INTERNAL MEDICINE

## 2024-07-23 PROCEDURE — 99214 OFFICE O/P EST MOD 30 MIN: CPT | Mod: 95 | Performed by: INTERNAL MEDICINE

## 2024-07-23 NOTE — PROGRESS NOTES
"THIS IS A VIDEO VISIT:    Phone call visit/virtual visit encounter:    Name of patient: Danika Hyman    Date of encounter: 7/23/2024    Time of start of video visit: 4:07    Video started: 4:10    Video ended: 4:20    Provider location: working from home/ [facility]    Patient location: patients home.    Mode of transmission: [employer] video/ [employer]    Verbal consent: obtained before starting visit. Pt is agreeable.      The patient has been notified of following:      \"This VIDEO visit will be conducted via a call between you and your physician/provider. We have found that certain health care needs can be provided without the need for a physical exam.  This service lets us provide the care you need with a short phone conversation.  If a prescription is necessary we can send it directly to your pharmacy.  If lab work is needed we can place an order for that and you can then stop by our lab to have the test done at a later time.     With new updates with corona virus patient might be billed as clinic visit.     If during the course of the call the physician/provider feels a telephone visit is not appropriate, you will not be charged for this service.\"      Past medical history, social history, family history, allergy and medications were reviewed and updated as appropriate.  Reviewed pertinent labs, notes, imaging studies personally.    Name: Danika Hyman  Date: 7/23/2024  Seen for f/u of osteoporosis.  Last visit 11/2022  HPI:  Danika Hyman is a 69 year old female who presents for the evaluation of osteoperosis.  Other past medical history includes history of gastric bypass surgery, esophageal reflux disease.    H/o osteopenia- in 50s.  DEXA 2017- osteoporosis  Following that she was seen at [facility] [place].  At that time given her history of GERD and gastric bypass surgery, intravenous therapy was considered. Never been on oral anti-reoprtive therapy.    Started Reclast-- June 2017.    DEXA " 7/2024: Low bone density (OSTEOPENIA).   INTERVAL CHANGE  -There has been a 2.2% decrease in lumbar spine BMD.  -There has been a 1.0% decrease in bilateral hip BMD.    RECLAST dates:  2023: Reclast on hold  09/09/22 08/20/21 08/07/20 08/01/19 07/20/18 06/23/17     Tolerated well.  No major complaints.  Has started to go to gym and has started weight training.  No fractures or falls.  Plans to retire in 4/2023.    DEXA  2018- showing- suggestion of a possible trend towards improvement of the lumbar spine, and no significant change of the total hip.  DEXA 2020: There is the suggestion of a possible trend towards improvement of the lumbar spine, and no significant change of the total hip.  DEXA 7/2022: Osteopenia, No significant change in bone density of the lumbar spine or hip(s).      RISK FACTORS:  Post-menopausal, Parent history of osteoporosis, Fracture of foot, Condition related to bone loss: gastric bypass, Follow-up osteopenia    H/o Gastric bypass at age 26 years.  Lost 80 lbs at that time and was able to keep it off.  H/o bulimia before that.  On HRT- followed by OBGYN. Is on testosterone replacement as well as estradiol.  Has multiple questions about HRT.    Smoke:No  Family History: mother   Menstrual history/Birthing:   Fractures:in last 10 year- fracture metatarsal after a fall.  Kidney stones: No  GI Surgery:h/o gastric bypass in past in 1982  Duration of therapy: Reclast as noted above  Exercise: Does 30877 steps/day, does lifts weights.   Diet:   Milk: Minimal   Cheese: Minimal   Ca/Vitamin D: Calcium- 1500 mg/day , vit D 1800 IU/day.  Alcohol:  rare  Eating Disorder: Yes: h/o bulimia as teenager  Steroid Use:  No  PMH/PSH:  Past Medical History:   Diagnosis Date    Esophageal reflux     Osteoporosis 05/04/2017    DEXA:  lumbar -2.8, left hip -2.1, right hip -2.5    Status post bariatric surgery 1982    vertical banded gastrosplasty at [facility]     Past Surgical History:   Procedure Laterality  Date    CATARACT IOL, RT/LT  2016    [facility] NONSPECIFIC PROCEDURE      wisdom teeth extraction,abstracted    Z NONSPECIFIC PROCEDURE      T&A,abstracted    [facility]C NONSPECIFIC PROCEDURE      2 natural childbirths,abstracted    ZZ NONSPECIFIC PROCEDURE  1982    gastric stapling ast [facility]     Family Hx:  Family History   Problem Relation Age of Onset    Arthritis Father         B:      Diabetes Father     Respiratory Father     Cardiovascular Father     Hypertension Mother     Alcohol/Drug Mother         B: alive    Lung Cancer Mother 89        lung cancer ;  age 91    Family History Negative Sister     Family History Negative Sister     Family History Negative Brother              Social Hx:  Social History     Socioeconomic History    Marital status:      Spouse name: Not on file    Number of children: Not on file    Years of education: Not on file    Highest education level: Not on file   Occupational History    Occupation:    Tobacco Use    Smoking status: Never    Smokeless tobacco: Never   Vaping Use    Vaping status: Never Used   Substance and Sexual Activity    Alcohol use: Yes     Comment: 4 glasses a week    Drug use: No    Sexual activity: Yes     Partners: Male   Other Topics Concern    Parent/sibling w/ CABG, MI or angioplasty before 65F 55M? Not Asked   Social History Narrative    Not on file     Social Determinants of Health     Financial Resource Strain: Low Risk  (2024)    Financial Resource Strain     Within the past 12 months, have you or your family members you live with been unable to get utilities (heat, electricity) when it was really needed?: No   Food Insecurity: Low Risk  (2024)    Food Insecurity     Within the past 12 months, did you worry that your food would run out before you got money to buy more?: No     Within the past 12 months, did the food you bought just not last and you didn t have money to get more?: No   Transportation Needs: Low  Risk  (4/18/2024)    Transportation Needs     Within the past 12 months, has lack of transportation kept you from medical appointments, getting your medicines, non-medical meetings or appointments, work, or from getting things that you need?: No   Physical Activity: Sufficiently Active (4/18/2024)    Exercise Vital Sign     Days of Exercise per Week: 5 days     Minutes of Exercise per Session: 60 min   Stress: No Stress Concern Present (4/18/2024)    [employer] [place] [employer] - Occupational Stress Questionnaire     Feeling of Stress : Only a little   Social Connections: Unknown (4/18/2024)    Social Connection and Isolation Panel [NHANES]     Frequency of Communication with Friends and Family: Not on file     Frequency of Social Gatherings with Friends and Family: More than three times a week     Attends Mu-ism Services: Not on file     Active Member of Clubs or Organizations: Not on file     Attends Club or Organization Meetings: Not on file     Marital Status: Not on file   Interpersonal Safety: Not on file   Housing Stability: Low Risk  (4/18/2024)    Housing Stability     Do you have housing? : Yes     Are you worried about losing your housing?: No          MEDICATIONS:  has a current medication list which includes the following prescription(s): calcium & phosphate w/ vit d & iron, multiple vitamins-minerals, collagen-vitamin c-biotin, estradiol, famotidine, and cyanocobalamin.    ROS     ROS: 10 point ROS neg other than the symptoms noted above in the HPI.    Physical Exam   VS: LMP 04/17/2009   GENERAL: healthy, alert and no distress  EYES: Eyes grossly normal to inspection, conjunctivae and sclerae normal  ENT: no nose swelling, nasal discharge.  Thyroid: no apparent thyroid nodules  RESP: no audible wheeze, cough, or visible cyanosis.  No visible retractions or increased work of breathing.  Able to speak fully in complete sentences.  ABDO: not evaluated.  EXTREMITIES: no hand  tremors.  NEURO: Cranial nerves grossly intact, mentation intact and speech normal  SKIN: No apparent skin lesions, rash or edema seen   PSYCH: mentation appears normal, affect normal/bright, judgement and insight intact, normal speech and appearance well-groomed    LABS:  TFTs:  TSH   Date Value Ref Range Status   06/20/2016 2.89 0.40 - 4.00 mU/L Final       PTH:   ENDO CALCIUM LABS-[facility] Latest Ref Rng & Units 3/19/2018   PARATHYROID HORMONE INTACT 18 - 80 pg/mL 41       Vitamin D:  Vitamin D Deficiency Screening Results:  Lab Results   Component Value Date    VITDT 42 04/25/2024    VITDT 40 11/02/2022    VITDT 38 01/05/2022    VITDT 41 07/29/2019    VITDT 55 03/19/2018       DEXA 5/2017:  IMPRESSION  Osteoporosis  Degenerative changes of the spine  Recommendations include ensuring adequate daily Calcium and Vitamin D intake  Follow up scan can be considered in three years.      DEXA 5/2018:  IMPRESSION  Osteopenia (low bone mass)  Degenerative changes of the spine  Recommendations include ensuring adequate daily Calcium and Vitamin D intake  Follow up scan can be considered in 3 years.      DEXA 6/2020:  FINDINGS:               Lumbar Spine (L1-L4)      T-score:  -2.0               Left Femoral Neck            T-score:  -1.5               Right Femoral Neck          T-score:  -1.9                             Lumbar (L1-L4) BMD: 0.945  Previous: 0.892                          Total Hip Mean BMD: 0.755  Previous: 0.750     Comparison is made to another DXA performed on the same [employer]  machine on 05/26/2018 and another in 2017.        IMPRESSION  Osteopenia (low bone mass)  Degenerative changes of the spine  Recommendations include ensuring adequate daily Calcium and Vitamin D intake  Follow up scan can be considered in three years.     Comparisons are not necessarily valid when precision within the machine has not been determined. Such a comparison has been performed here; one should interpret with caution.    Compared to previous bone densitometry performed on this patient, there is the suggestion of a possible trend towards improvement of the lumbar spine, and no significant change of the total hip.    DEXA 7/2022:  IMPRESSION  Osteopenia., Degenerative changes of the lumbar spine which may falsely elevate results.   There has been no significant change in bone density of the lumbar spine. There has been no significant change in bone density of the hip(s).       DEXA 7/2024: Low bone density (OSTEOPENIA).   INTERVAL CHANGE  -There has been a 2.2% decrease in lumbar spine BMD.  -There has been a 1.0% decrease in bilateral hip BMD.    All pertinent notes, labs, and images personally reviewed by me.   All pertinent notes, labs and reports done at outside clinic personally reviewed by me.      A/P  Ms.Nicole JOSETTE Hyman is a 69 year old here for the evaluation of:    #1 Osteoporosis:  Risk factors for low bone density include family history, , female, s/p gastric bypass, low BMI, Estrogen deficiency, low Ca intake.  DEXA 2017 consistent with osteoporosis  She received Reclast since [id number]  DEXA 7/2024: Low bone density (OSTEOPENIA) with decrease in BMD.  Plan:  Discussed diagnosis, pathophysiology, management and treatment options of condition with pt.  Given history of gastric bypass as well as GERD she needs close monioting.  Plan for PROLIA.  DEXA in 2026 ( needs to be ordered)  Follow up in 1 year     #2. H/o gastric bypass:  On calcium and  vit D replacement.  Recent labs in range.    #3. HRT:  Followed by outside provider.  Not discussed.    The pt was advised to  Maintain an adequate calcium and vitamin D intake and to supplement vitamin D if needed to maintain serum levels of 25 hydroxy D (25 OH D) between 30-60 ng/ml.  Limit alcohol intake to no more than 2 servings per day.  Limit caffeine intake.  Maintain an active lifestyle including weight-bearing exercises for at least 30 mins daily.  Take measures to  reduce the risk of falling.    Possible major side effects of Denosumab (PROLIA) include risk for atypical femur fractures, hypersensitivity, low calcium levels, osteonecrosis of jaw (which can manifest as jaw pain, osteomyelitis, osteitis, bone erosion, tooth/periodontal infection, toothache, gingival ulceration/erosion). Other s/e include but not limited to Hypertension, Headache and peripheral edema.   Always let your dentist lnow about the medication if plan for major dental procedure.    Indication: Prolia  (denosumab) is a prescription medicine used to treat osteoporosis in patients who:   Are at high risk for fracture, meaning patients who have had a fracture related to osteoporosis, or who have multiple risk factors for fracture   Cannot use another osteoporosis medicine or other osteoporosis medicines did not work well   The timeline for early/late injections would be 4 weeks early and any time after the 6 month robert. If a patient receives their injection late, then the subsequent injection would be 6 months from the date that they actually received the injection      Discussed indications, risks and benefits of all medications prescribed, and answered questions to patient's satisfaction.  The longitudinal plan of care for the diagnosis(es)/condition(s) as documented were addressed during this visit. Due to the added complexity in care, I will continue to support Danika in the subsequent management and with ongoing continuity of care.  All questions were answered.  The patient indicates understanding of the above issues and agrees with the plan set forth.    Follow-up:  As noted in AVS    Fe Reyna MD  Endocrinology  [facility]  CC: Dexter Floyd    Addendum to above note and clinic visit:    Labs reviewed.    See result note/telephone encounter.      The following steps were completed to comply with the REMS program for Prolia:  Reviewed the serious risks of Prolia  and the  symptoms of each risk.  Advised patient to seek prompt medical attention if they have signs or symptoms of any of the serious risks.  Patient will be provided a copy of the Medication Guide and Patient Brochure prior to first injection.    Fe Reyna MD

## 2024-07-23 NOTE — LETTER
"7/23/2024      Danika Hyman  [address] [place] [address]      Dear Colleague,    Thank you for referring your patient, Danika Hyman, to the [facility]. Please see a copy of my visit note below.    THIS IS A VIDEO VISIT:    Phone call visit/virtual visit encounter:    Name of patient: Danika Hyman    Date of encounter: 7/23/2024    Time of start of video visit: 4:07    Video started: 4:10    Video ended: 4:20    Provider location: working from home/ [facility]    Patient location: patients home.    Mode of transmission: [employer] video/ [employer]    Verbal consent: obtained before starting visit. Pt is agreeable.      The patient has been notified of following:      \"This VIDEO visit will be conducted via a call between you and your physician/provider. We have found that certain health care needs can be provided without the need for a physical exam.  This service lets us provide the care you need with a short phone conversation.  If a prescription is necessary we can send it directly to your pharmacy.  If lab work is needed we can place an order for that and you can then stop by our lab to have the test done at a later time.     With new updates with corona virus patient might be billed as clinic visit.     If during the course of the call the physician/provider feels a telephone visit is not appropriate, you will not be charged for this service.\"      Past medical history, social history, family history, allergy and medications were reviewed and updated as appropriate.  Reviewed pertinent labs, notes, imaging studies personally.    Name: Danika Hyman  Date: 7/23/2024  Seen for f/u of osteoporosis.  Last visit 11/2022  HPI:  Danika Hyman is a 69 year old female who presents for the evaluation of osteoperosis.  Other past medical history includes history of gastric bypass surgery, esophageal reflux disease.    H/o osteopenia- in 50s.  DEXA 2017- osteoporosis  Following that she " was seen at [facility] [place].  At that time given her history of GERD and gastric bypass surgery, intravenous therapy was considered. Never been on oral anti-reoprtive therapy.    Started Reclast-- June 2017.    DEXA 7/2024: Low bone density (OSTEOPENIA).   INTERVAL CHANGE  -There has been a 2.2% decrease in lumbar spine BMD.  -There has been a 1.0% decrease in bilateral hip BMD.    RECLAST dates:  2023: Reclast on hold  09/09/22 08/20/21 08/07/20 08/01/19 07/20/18 06/23/17     Tolerated well.  No major complaints.  Has started to go to gym and has started weight training.  No fractures or falls.  Plans to retire in 4/2023.    DEXA  2018- showing- suggestion of a possible trend towards improvement of the lumbar spine, and no significant change of the total hip.  DEXA 2020: There is the suggestion of a possible trend towards improvement of the lumbar spine, and no significant change of the total hip.  DEXA 7/2022: Osteopenia, No significant change in bone density of the lumbar spine or hip(s).      RISK FACTORS:  Post-menopausal, Parent history of osteoporosis, Fracture of foot, Condition related to bone loss: gastric bypass, Follow-up osteopenia    H/o Gastric bypass at age 26 years.  Lost 80 lbs at that time and was able to keep it off.  H/o bulimia before that.  On HRT- followed by OBGYN. Is on testosterone replacement as well as estradiol.  Has multiple questions about HRT.    Smoke:No  Family History: mother   Menstrual history/Birthing:   Fractures:in last 10 year- fracture metatarsal after a fall.  Kidney stones: No  GI Surgery:h/o gastric bypass in past in 1982  Duration of therapy: Reclast as noted above  Exercise: Does 76328 steps/day, does lifts weights.   Diet:   Milk: Minimal   Cheese: Minimal   Ca/Vitamin D: Calcium- 1500 mg/day , vit D 1800 IU/day.  Alcohol:  rare  Eating Disorder: Yes: h/o bulimia as teenager  Steroid Use:  No  PMH/PSH:  Past Medical History:   Diagnosis Date      Esophageal reflux      Osteoporosis 2017    DEXA:  lumbar -2.8, left hip -2.1, right hip -2.5     Status post bariatric surgery 1982    vertical banded gastrosplasty at [facility]     Past Surgical History:   Procedure Laterality Date     CATARACT IOL, RT/LT  2016     Z NONSPECIFIC PROCEDURE      wisdom teeth extraction,abstracted     [facility] NONSPECIFIC PROCEDURE      T&A,abstracted     [facility] NONSPECIFIC PROCEDURE      2 natural childbirths,abstracted     [facility] NONSPECIFIC PROCEDURE  1982    gastric stapling ast [facility]     Family Hx:  Family History   Problem Relation Age of Onset     Arthritis Father         B:       Diabetes Father      Respiratory Father      Cardiovascular Father      Hypertension Mother      Alcohol/Drug Mother         B:192 alive     Lung Cancer Mother 89        lung cancer ;  age 91     Family History Negative Sister      Family History Negative Sister      Family History Negative Brother              Social Hx:  Social History     Socioeconomic History     Marital status:      Spouse name: Not on file     Number of children: Not on file     Years of education: Not on file     Highest education level: Not on file   Occupational History     Occupation:    Tobacco Use     Smoking status: Never     Smokeless tobacco: Never   Vaping Use     Vaping status: Never Used   Substance and Sexual Activity     Alcohol use: Yes     Comment: 4 glasses a week     Drug use: No     Sexual activity: Yes     Partners: Male   Other Topics Concern     Parent/sibling w/ CABG, MI or angioplasty before 65F 55M? Not Asked   Social History Narrative     Not on file     Social Determinants of Health     Financial Resource Strain: Low Risk  (2024)    Financial Resource Strain      Within the past 12 months, have you or your family members you live with been unable to get utilities (heat, electricity) when it was really needed?: No   Food Insecurity: Low Risk  (2024)    Food  Insecurity      Within the past 12 months, did you worry that your food would run out before you got money to buy more?: No      Within the past 12 months, did the food you bought just not last and you didn t have money to get more?: No   Transportation Needs: Low Risk  (4/18/2024)    Transportation Needs      Within the past 12 months, has lack of transportation kept you from medical appointments, getting your medicines, non-medical meetings or appointments, work, or from getting things that you need?: No   Physical Activity: Sufficiently Active (4/18/2024)    Exercise Vital Sign      Days of Exercise per Week: 5 days      Minutes of Exercise per Session: 60 min   Stress: No Stress Concern Present (4/18/2024)    [employer] - Occupational Stress Questionnaire      Feeling of Stress : Only a little   Social Connections: Unknown (4/18/2024)    Social Connection and Isolation Panel [NHANES]      Frequency of Communication with Friends and Family: Not on file      Frequency of Social Gatherings with Friends and Family: More than three times a week      Attends [employer]ology Services: Not on file      Active Member of Clubs or Organizations: Not on file      Attends Club or Organization Meetings: Not on file      Marital Status: Not on file   Interpersonal Safety: Not on file   Housing Stability: Low Risk  (4/18/2024)    Housing Stability      Do you have housing? : Yes      Are you worried about losing your housing?: No          MEDICATIONS:  has a current medication list which includes the following prescription(s): calcium & phosphate w/ vit d & iron, multiple vitamins-minerals, collagen-vitamin c-biotin, estradiol, famotidine, and cyanocobalamin.    ROS     ROS: 10 point ROS neg other than the symptoms noted above in the HPI.    Physical Exam   VS: LMP 04/17/2009   GENERAL: healthy, alert and no distress  EYES: Eyes grossly normal to inspection, conjunctivae and sclerae normal  ENT: no nose  swelling, nasal discharge.  Thyroid: no apparent thyroid nodules  RESP: no audible wheeze, cough, or visible cyanosis.  No visible retractions or increased work of breathing.  Able to speak fully in complete sentences.  ABDO: not evaluated.  EXTREMITIES: no hand tremors.  NEURO: Cranial nerves grossly intact, mentation intact and speech normal  SKIN: No apparent skin lesions, rash or edema seen   PSYCH: mentation appears normal, affect normal/bright, judgement and insight intact, normal speech and appearance well-groomed    LABS:  TFTs:  TSH   Date Value Ref Range Status   06/20/2016 2.89 0.40 - 4.00 mU/L Final       PTH:   ENDO CALCIUM LABS-[facility] Latest Ref Rng & Units 3/19/2018   PARATHYROID HORMONE INTACT 18 - 80 pg/mL 41       Vitamin D:  Vitamin D Deficiency Screening Results:  Lab Results   Component Value Date    VITDT 42 04/25/2024    VITDT 40 11/02/2022    VITDT 38 01/05/2022    VITDT 41 07/29/2019    VITDT 55 03/19/2018       DEXA 5/2017:  IMPRESSION  Osteoporosis  Degenerative changes of the spine  Recommendations include ensuring adequate daily Calcium and Vitamin D intake  Follow up scan can be considered in three years.      DEXA 5/2018:  IMPRESSION  Osteopenia (low bone mass)  Degenerative changes of the spine  Recommendations include ensuring adequate daily Calcium and Vitamin D intake  Follow up scan can be considered in 3 years.      DEXA 6/2020:  FINDINGS:               Lumbar Spine (L1-L4)      T-score:  -2.0               Left Femoral Neck            T-score:  -1.5               Right Femoral Neck          T-score:  -1.9                             Lumbar (L1-L4) BMD: 0.945  Previous: 0.892                          Total Hip Mean BMD: 0.755  Previous: 0.750     Comparison is made to another DXA performed on the same [employer]  machine on 05/26/2018 and another in 2017.        IMPRESSION  Osteopenia (low bone mass)  Degenerative changes of the spine  Recommendations include ensuring adequate  daily Calcium and Vitamin D intake  Follow up scan can be considered in three years.     Comparisons are not necessarily valid when precision within the machine has not been determined. Such a comparison has been performed here; one should interpret with caution.   Compared to previous bone densitometry performed on this patient, there is the suggestion of a possible trend towards improvement of the lumbar spine, and no significant change of the total hip.    DEXA 7/2022:  IMPRESSION  Osteopenia., Degenerative changes of the lumbar spine which may falsely elevate results.   There has been no significant change in bone density of the lumbar spine. There has been no significant change in bone density of the hip(s).       DEXA 7/2024: Low bone density (OSTEOPENIA).   INTERVAL CHANGE  -There has been a 2.2% decrease in lumbar spine BMD.  -There has been a 1.0% decrease in bilateral hip BMD.    All pertinent notes, labs, and images personally reviewed by me.   All pertinent notes, labs and reports done at outside clinic personally reviewed by me.      A/P  Ms.Nicole JOSETTE Hyman is a 69 year old here for the evaluation of:    #1 Osteoporosis:  Risk factors for low bone density include family history, , female, s/p gastric bypass, low BMI, Estrogen deficiency, low Ca intake.  DEXA 2017 consistent with osteoporosis  She received Reclast since [id number]  DEXA 7/2024: Low bone density (OSTEOPENIA) with decrease in BMD.  Plan:  Discussed diagnosis, pathophysiology, management and treatment options of condition with pt.  Given history of gastric bypass as well as GERD she needs close monioting.  Plan for PROLIA.  DEXA in 2026 ( needs to be ordered)  Follow up in 1 year     #2. H/o gastric bypass:  On calcium and  vit D replacement.  Recent labs in range.    #3. HRT:  Followed by outside provider.  Not discussed.    The pt was advised to  Maintain an adequate calcium and vitamin D intake and to supplement vitamin D if needed  to maintain serum levels of 25 hydroxy D (25 OH D) between 30-60 ng/ml.  Limit alcohol intake to no more than 2 servings per day.  Limit caffeine intake.  Maintain an active lifestyle including weight-bearing exercises for at least 30 mins daily.  Take measures to reduce the risk of falling.    Possible major side effects of Denosumab (PROLIA) include risk for atypical femur fractures, hypersensitivity, low calcium levels, osteonecrosis of jaw (which can manifest as jaw pain, osteomyelitis, osteitis, bone erosion, tooth/periodontal infection, toothache, gingival ulceration/erosion). Other s/e include but not limited to Hypertension, Headache and peripheral edema.   Always let your dentist lnow about the medication if plan for major dental procedure.    Indication: Prolia  (denosumab) is a prescription medicine used to treat osteoporosis in patients who:   Are at high risk for fracture, meaning patients who have had a fracture related to osteoporosis, or who have multiple risk factors for fracture   Cannot use another osteoporosis medicine or other osteoporosis medicines did not work well   The timeline for early/late injections would be 4 weeks early and any time after the 6 month robert. If a patient receives their injection late, then the subsequent injection would be 6 months from the date that they actually received the injection      Discussed indications, risks and benefits of all medications prescribed, and answered questions to patient's satisfaction.  The longitudinal plan of care for the diagnosis(es)/condition(s) as documented were addressed during this visit. Due to the added complexity in care, I will continue to support Danika in the subsequent management and with ongoing continuity of care.  All questions were answered.  The patient indicates understanding of the above issues and agrees with the plan set forth.    Follow-up:  As noted in AVS    Fe Reyna MD  Endocrinology  [facility]  CC: Dexter Floyd    Addendum to above note and clinic visit:    Labs reviewed.    See result note/telephone encounter.      The following steps were completed to comply with the REMS program for Prolia:  Reviewed the serious risks of Prolia  and the symptoms of each risk.  Advised patient to seek prompt medical attention if they have signs or symptoms of any of the serious risks.  Patient will be provided a copy of the Medication Guide and Patient Brochure prior to first injection.    Fe Reyna MD      Again, thank you for allowing me to participate in the care of your patient.        Sincerely,        Fe Reyna MD

## 2024-07-23 NOTE — NURSING NOTE
Current patient location: [address] [place] [address]  [place] [address]    Is the patient currently in the state of [place]? YES    Visit mode:VIDEO    If the visit is dropped, the patient can be reconnected by: VIDEO VISIT: Send to e-mail at: [email or web address]    Will anyone else be joining the visit? NO  (If patient encounters technical issues they should call [phone]    How would you like to obtain your AVS? MyChart    Are changes needed to the allergy or medication list? No    Are refills needed on medications prescribed by this physician? NO    Reason for visit: RECHMICHELLE June, MAYRA VVF

## 2024-07-23 NOTE — PATIENT INSTRUCTIONS
[facility]  Dr Reyna, Endocrinology Department    [facility]   [address] [id number]  [place] [address]  Appointment [phone]  Fax: [phone]  [place]: Monday - Thursday      Plan for PROLIA.  DEXA in  ( needs to be ordered)  Follow up in 1 year ( recommend in person visit)      PROLIA Scheduling information:  It will be done in clinic.   You need to make nurse only appointment. (call [place]: [phone] or [place]:[phone] and schedule Nurse only appointment)  You will need labs few days prior to PROLIA (calcium labs)- please schedule lab appointment.  PROLIA is every 6 months injection- so please schedule accordingly.  It is recommended NOT to miss or delay PROLIA injections.    Possible major side effects of Denosumab (PROLIA) include risk for atypical femur fractures, hypersensitivity, low calcium levels, osteonecrosis of jaw (which can manifest as jaw pain, osteomyelitis, osteitis, bone erosion, tooth/periodontal infection, toothache, gingival ulceration/erosion). Other s/e include but not limited to Hypertension, Headache and peripheral edema.   Always let your dentist lnow about the medication if plan for major dental procedure.    Indication: Prolia  (denosumab) is a prescription medicine used to treat osteoporosis in patients who:   Are at high risk for fracture, meaning patients who have had a fracture related to osteoporosis, or who have multiple risk factors for fracture   Cannot use another osteoporosis medicine or other osteoporosis medicines did not work well   The timeline for early/late injections would be 4 weeks early and any time after the 6 month robert. If a patient receives their injection late, then the subsequent injection would be 6 months from the date that they actually received the injection      The pt was advised to  Maintain an adequate calcium and vitamin D intake and to supplement vitamin D if needed to  maintain serum levels of 25 hydroxy D (25 OH D) between 30-60 ng/ml.  Limit alcohol intake to no more than 2 servings per day.  Limit caffeine intake.  Maintain an active lifestyle including weight-bearing exercises for at least 30 mins daily.  Take measures to reduce the risk of falling.     You should get 1000- 1200 mg/day calcium in divided doses of no more than 500 mg/dose.  This INCLUDES what is in your food as well as what is in any supplements you may be taking.    Vit D about 800-1000 IU/day ( unless you have vit D deficiency- in that case higher dose)  Dietary sources of calcium:: These also contain vitamin D  Milk                            8 oz            300 mg calcium  Yogurt                          1 cup           400 mg calcium   Hard cheese                     1.5 oz          300 mg  Cottage cheese                  2 cup           300 mg  Orange juice with Calcium       8 oz            300 mg  Low fat dairy sources are recommended        You should get 30 minutes of moderate weight bearing exercise on most days of the week .  Weight bearing exercise includes such things as walking, jogging, hiking, dancing.  You should also get Strength training 2 or more times/week in addition to other weight -being exercise. Strength training uses weight or resistance beyond that seen in everyday activities -(pilates, weight training with free weights, weight machines or resistance bands)     Living with Osteoporosis: Preventing Fractures  If you have osteoporosis, you can do a lot to reduce its effect on your life. Knowing how to prevent fractures and spinal curvature can help you live more comfortably and safely with this disease.  Reducing your risk for fractures  The most common fracture sites in people with osteoporosis are the wrist, spine, and hip. These fractures are often caused by accidents and falls. All fractures are painful and may limit what you can do. But hip fractures are very serious. They often  need surgery, and it can take months to recover. To reduce your risk for fractures:  Get regular exercise. Try walking, swimming, or weight training.  Eat foods that are rich in calcium, or take calcium supplements.  Make your home safe to help avoid accidents.  Take extra precautions not to fall in risky areas, such as icy sidewalks.  Understanding spinal fractures  Your spine is made up of many bones called vertebrae. Osteoporosis can cause the vertebrae in your spine to collapse. As a result, your upper back may arch forward, creating a curvature. Spine fractures may also result from back strain and bad posture. You will also lose height. Your lower spine must then adjust to keep your body balanced. This can cause back pain. To prevent or lessen these spinal changes:  Practice good posture.  Use proper techniques if you need to lift heavy objects.  Do back exercises to help your posture.  Lie on your back when you have pain.  Ask your healthcare provider about these and other ways to help your spine.  [employer] last reviewed this educational content on 5/1/2018 2000-2021 [employer]. All rights reserved. This information is not intended as a substitute for professional medical care. Always follow your healthcare professional's instructions.          Living with Osteoporosis: Regular Exercise  If you have osteoporosis, exercise is vital for your health. It can prevent bone fractures and spine changes. It will slow bone loss. Exercise will strengthen your body. It can also be fun. A variety of exercises is best. See below for exercises that can help you. But before you start, talk with your healthcare provider to be sure these exercises are right for you.   Resistance exercises. These build muscle strength and maintain bone mass. They also make you less prone to injury. Exercises include lifting small weights, doing push-ups and sit-ups, using elastic exercise bands, and using weight machines.    Weight-bearing activities. These help your whole body. They also help you maintain bone mass. Activities include walking, dancing, and housework.   Non-weight-bearing exercises. These help prevent back strain and pain. They do this by building the trunk and leg muscles. Exercises that help with flexibility can prevent falls. Examples include swimming, water exercise, and stretching.   Staying safe  Here are tips to stay safe:   Always check with your healthcare provider before starting any new exercise program.  Use weights only as instructed.  Stop any exercise that causes pain.  [employer] last reviewed this educational content on 5/1/2018 2000-2021 [employer]. All rights reserved. This information is not intended as a substitute for professional medical care. Always follow your healthcare professional's instructions.          Preventing Osteoporosis: Avoiding Bone Loss  Certain factors can speed up bone loss or decrease bone growth. For example, alcohol, cigarettes, and certain medicines reduce bone mass. Some foods make it hard for your body to absorb calcium.  Things to avoid  Here are things to avoid to help prevent osteoporosis:  Alcohol. This is toxic to bones. It is a major cause of bone loss. Heavy drinking can cause osteoporosis even if you have no other risk factors.  Smoking. This reduces bone mass. Smoking may also interfere with estrogen levels and cause early menopause.  Inactivity. Not being active makes your bones lose strength and become thinner. Over time, thin bones may break. Women who aren't active are at a high risk for osteoporosis.  Certain medicines. Some medicines, such as cortisone, increase bone loss. They also decrease bone growth. Ask your healthcare provider about any side effects of your medicines, and how to prevent them.  Protein-rich or salty foods. Eaten in large amounts, these foods may deplete calcium.  Caffeine. This increases calcium loss. People who drink  a lot of coffee, tea, or soda lose more calcium than those who don't.  [employer] last reviewed this educational content on 2018-2021 [employer]. All rights reserved. This information is not intended as a substitute for professional medical care. Always follow your healthcare professional's instructions.          Preventing Osteoporosis: Meeting Your Calcium Needs  Your body needs calcium to build and repair bones. But it can't make calcium on its own. That's why it's important to eat calcium-rich foods. Some foods are naturally rich in calcium. Others have calcium added (fortified). It's best to get calcium from the foods you eat. But if you can't get enough, you may want to take calcium supplements. To meet your daily calcium needs, try the foods listed below.                          Dairy Fish & beans Other sources   Source   Calcium (mg) per serving   Source   Calcium (mg) per serving   Source   Calcium (mg) per serving   Low-fat yogurt, plain   415 mg/8 oz.   Sardines, Atlantic, canned, with bones   351 mg/3 oz.   Oatmeal, instant, fortified   215 mg/1 cup   Nonfat milk   302 mg/1 cup   [place], sockeye, canned, with bones   239 mg/3 oz.   Tofu made with calcium sulfate   204 mg/3 oz.   Low-fat milk   297 mg/1 cup   Soybeans, fresh, boiled   131 mg/1/2 cup   Collards   179 mg/1/2 cup   Swiss cheese   272 mg/1 oz.   White beans, cooked   81 mg/1/2 cup   English muffin, whole wheat   175 mg/1 muffin   Cheddar cheese   205 mg/1 oz.   Navy beans, cooked   79 mg/1/2 cup   Kale   90 mg/1/2 cup   Ice cream strawberry   79 mg/1/2 cup           Orange, navel   56 mg/1 medium   Note: Calcium levels may vary depending on brand and size.  Daily calcium needs  14 to 18 years old: 1,300 mg  19 to 30 years old: 1,000 mg  31 to 50 years old: 1,000 mg  51 to 70 years old, women: 1,200 mg  51 to 70 years old, men: 1,000 mg  Pregnant or nursin to 18 years old: 1,300 mg, 19 to 50 years old: 1,000  mg  Older than 70 (women and men): 1,200 mg   [place] last reviewed this educational content on 5/1/2018 2000-2021 [employer]. All rights reserved. This information is not intended as a substitute for professional medical care. Always follow your healthcare professional's instructions.

## 2024-07-24 ENCOUNTER — NURSE TRIAGE (OUTPATIENT)
Dept: PEDIATRICS | Facility: CLINIC | Age: 69
End: 2024-07-24
Payer: MEDICARE

## 2024-07-24 ENCOUNTER — MYC MEDICAL ADVICE (OUTPATIENT)
Dept: ENDOCRINOLOGY | Facility: CLINIC | Age: 69
End: 2024-07-24
Payer: MEDICARE

## 2024-07-24 DIAGNOSIS — Z20.822 SUSPECTED 2019 NOVEL CORONAVIRUS INFECTION: Primary | ICD-10-CM

## 2024-07-24 NOTE — TELEPHONE ENCOUNTER
Patient tested positive for Covid last night. Symptoms started yesterday. Patient experiencing sore throat, chills, headache, fatigue, body aches, fever (99 this morning). Oximeter last night was 94%. Patient denies any chest pain or difficulty breathing. Patient would like paxlovid.     RN COVID TREATMENT VISIT  07/24/24      The patient has been triaged and does not require a higher level of care.    Danika Hyman  69 year old  Current weight? 131lbs    Has the patient been seen by a primary care provider at an [facility] or [facility] within the past two years? Yes.   Have you been in close proximity to/do you have a known exposure to a person with a confirmed case of influenza? No.     General treatment eligibility:  Date of positive COVID test (PCR or at home)?  07/23/24    Are you or have you been hospitalized for this COVID-19 infection? No.   Have you received monoclonal antibodies or antiviral treatment for COVID-19 since this positive test? No.   Do you have any of the following conditions that place you at risk of being very sick from COVID-19?   - Age 50 years or older  Yes, patient has at least one high risk condition as noted above.     Current COVID symptoms:   - fever or chills  - fatigue  - muscle or body aches  - headache  - sore throat  Yes. Patient has at least one symptom as selected.     How many days since symptoms started? 5 days or less. Established patient, 12 years or older weighing at least 88.2 lbs, who has symptoms that started in the past 5 days, has not been hospitalized nor received treatment already, and is at risk for being very sick from COVID-19.     Treatment eligibility by RN:  Are you currently pregnant or nursing? No  Do you have a clinically significant hypersensitivity to nirmatrelvir or ritonavir, or toxic epidermal necrolysis (TEN) or Cabral-Nabeel Syndrome? No  Do you have a history of hepatitis, any hepatic impairment on the Problem List  (such as Child-Dao Class C, cirrhosis, fatty liver disease, alcoholic liver disease), or was the last liver lab (hepatic panel, ALT, AST, ALK Phos, bilirubin) elevated in the past 6 months? No  Do you have any history of severe renal impairment (eGFR < 30mL/min)? No    Is patient eligible to continue? Yes, patient meets all eligibility requirements for the RN COVID treatment (as denoted by all no responses above).     Current Outpatient Medications   Medication Sig Dispense Refill    CALCIUM PLUS TABS   OR Take 1 tablet by mouth 2 times daily      CENTRUM SILVER OR 1 tablet daily      Collagen-Vitamin C-Biotin (COLLAGEN 1500/C PO) Take 1 tablet by mouth daily      estradiol (ESTRACE) 0.1 MG/GM vaginal cream PLACE 1 GRAM VAGINALLY 2 TIMES EVERY WEEK 42.5 g 5    famotidine (PEPCID) 20 MG tablet Take 1 tablet (20 mg) by mouth 2 times daily 60 tablet 0    VITAMIN B-12 500 MCG OR TABS 1 sublingual qday 30 5       Medications from List 1 of the standing order (on medications that exclude the use of Paxlovid) that patient is taking: NONE. Is patient taking Ashia's Wort? No  Is patient taking [place]'s Wort or any meds from List 1? No.   Medications from List 2 of the standing order (on meds that provider needs to adjust) that patient is taking: NONE. Is patient on any of the meds from List 2? No.   Medications from List 3 of standing order (on meds that a RN needs to adjust) that patient is taking: NONE. Is patient on any meds from List 3? No.     Paxlovid has an approximate 90% reduction in hospitalization. Paxlovid can possibly cause altered sense of taste, diarrhea (loose, watery stools), high blood pressure, muscle aches.     Would patient like a Paxlovid prescription?   Yes.   Lab Results   Component Value Date    GFRESTIMATED >90 04/25/2024       Was last eGFR reduced? No, eGFR 60 or greater/ No Result on record. Patient can receive the normal renal function dose. Paxlovid Rx sent to [place] [facility]    N[facility]    Temporary change to home medications: None    All medication adjustments (holds, etc) were discussed with the patient and patient was asked to repeat back (teachback) their med adjustment.  Did patient understand med adjustment? No medication adjustments needed.         Reviewed the following instructions with the patient:    Paxlovid (nimatrelvir and ritonavir)    How it works  Two medicines (nirmatrelvir and ritonavir) are taken together. They stop the virus from growing. Less amount of virus is easier for your body to fight.    How to take  Medicine comes in a daily container with both medicine tablets. Take by mouth twice daily (once in the morning, once at night) for 5 days.  The number of tablets to take varies by patient.  Don't chew or break capsules. Swallow whole.    When to take  Take as soon as possible after positive COVID-19 test result, and within 5 days of your first symptoms.    Possible side effects  Can cause altered sense of taste, diarrhea (loose, watery stools), high blood pressure, muscle aches.    Abad Gallegos RN       Reason for Disposition   HIGH RISK patient (e.g., weak immune system, age > 64 years, obesity with BMI of 30 or higher, pregnant, chronic lung disease or other chronic medical condition) and COVID symptoms (e.g., cough, fever)  (Exceptions: Already seen by doctor or NP/PA and no new or worsening symptoms.)    Additional Information   Negative: SEVERE difficulty breathing (e.g., struggling for each breath, speaks in single words)   Negative: Difficult to awaken or acting confused (e.g., disoriented, slurred speech)   Negative: Bluish (or gray) lips or face now   Negative: Shock suspected (e.g., cold/pale/clammy skin, too weak to stand, low BP, rapid pulse)   Negative: Sounds like a life-threatening emergency to the triager   Negative: Diagnosed or suspected COVID-19 and symptoms lasting 3 or more weeks   Negative: COVID-19 exposure and no symptoms    Negative: COVID-19 vaccine reaction suspected (e.g., fever, headache, muscle aches) occurring 1 to 3 days after getting vaccine   Negative: COVID-19 vaccine, questions about   Negative: Lives with someone known to have influenza (flu test positive) and flu-like symptoms (e.g., cough, runny nose, sore throat, SOB; with or without fever)   Negative: Possible COVID-19 symptoms and triager concerned about severity of symptoms or other causes   Negative: COVID-19 and breastfeeding, questions about   Negative: SEVERE or constant chest pain or pressure  (Exception: Mild central chest pain, present only when coughing.)   Negative: MODERATE difficulty breathing (e.g., speaks in phrases, SOB even at rest, pulse 100-120)   Negative: Headache and stiff neck (can't touch chin to chest)   Negative: Oxygen level (e.g., pulse oximetry) 90% or lower   Negative: Chest pain or pressure  (Exception: MILD central chest pain, present only when coughing.)   Negative: Drinking very little and dehydration suspected (e.g., no urine > 12 hours, very dry mouth, very lightheaded)   Negative: Patient sounds very sick or weak to the triager   Negative: Fever > 101 F (38.3 C) and over 60 years of age   Negative: Fever > 100.0 F (37.8 C) and bedridden (e.g., CVA, chronic illness, recovering from surgery)   Negative: MILD difficulty breathing (e.g., minimal/no SOB at rest, SOB with walking, pulse <100)   Negative: Fever > 103 F (39.4 C)    Protocols used: Coronavirus (COVID-19) Diagnosed or [id number]

## 2024-08-07 ENCOUNTER — TELEPHONE (OUTPATIENT)
Dept: PEDIATRICS | Facility: CLINIC | Age: 69
End: 2024-08-07
Payer: MEDICARE

## 2024-08-07 NOTE — TELEPHONE ENCOUNTER
"Incoming call from patient interested in scheduling prolia injection at [facility]. This will be her first injection.     Per chart review of note from Dr Reyna 7/23/24:  \"PROLIA Scheduling information:  It will be done in clinic.   You need to make nurse only appointment. (call [place]: [phone] or [place]:[phone] and schedule Nurse only appointment)  You will need labs few days prior to PROLIA (calcium labs)- please schedule lab appointment.  PROLIA is every 6 months injection- so please schedule accordingly.  It is recommended NOT to miss or delay PROLIA injections.\"    I messaged PA team to confirm PA is completed.  I messaged EA nurse team to confirm Wednesday is ok for injection scheduling. Scheduled for lab visit 8/12/24.  Awaiting message back from nurse team prior to scheduling injection for Wednesday 8/14/24.    Tabitha Jacobsen RN      "

## 2024-08-07 NOTE — TELEPHONE ENCOUNTER
Called patient back.     Got EDER, LMTCB.    Ok to schedule prolia injection on 08/15 but if Calcium level not back we will have to reschedule.    Waiting for call back form patient.    KYA Hernadez on 8/7/2024 at 2:27 PM

## 2024-08-07 NOTE — TELEPHONE ENCOUNTER
Patient returned call to clinic.     RN relayed message below and assisted with scheduling RN visit for Prolia injection on 8/15/24.     Patient has lab visit on 8/12/24 to have calcium checked.     Patient denies further questions or concerns at this time.    Clarissa Edwards, RN, BSN, PHN  [facility]

## 2024-08-12 ENCOUNTER — LAB (OUTPATIENT)
Dept: LAB | Facility: CLINIC | Age: 69
End: 2024-08-12
Payer: MEDICARE

## 2024-08-12 DIAGNOSIS — Z78.0 ASYMPTOMATIC MENOPAUSAL STATE: ICD-10-CM

## 2024-08-12 DIAGNOSIS — Z98.84 STATUS POST BARIATRIC SURGERY: ICD-10-CM

## 2024-08-12 DIAGNOSIS — Z92.29 PERSONAL HISTORY OF OTHER DRUG THERAPY: ICD-10-CM

## 2024-08-12 DIAGNOSIS — M81.0 AGE-RELATED OSTEOPOROSIS WITHOUT CURRENT PATHOLOGICAL FRACTURE: ICD-10-CM

## 2024-08-12 DIAGNOSIS — K21.9 GASTROESOPHAGEAL REFLUX DISEASE WITHOUT ESOPHAGITIS: ICD-10-CM

## 2024-08-12 DIAGNOSIS — M81.0 OSTEOPOROSIS, UNSPECIFIED OSTEOPOROSIS TYPE, UNSPECIFIED PATHOLOGICAL FRACTURE PRESENCE: ICD-10-CM

## 2024-08-12 DIAGNOSIS — Z92.29 HISTORY OF BISPHOSPHONATE THERAPY: ICD-10-CM

## 2024-08-12 PROCEDURE — 82310 ASSAY OF CALCIUM: CPT

## 2024-08-12 PROCEDURE — 36415 COLL VENOUS BLD VENIPUNCTURE: CPT

## 2024-08-13 LAB — CALCIUM SERPL-MCNC: 9.2 MG/DL (ref 8.8–10.4)

## 2024-08-15 ENCOUNTER — ALLIED HEALTH/NURSE VISIT (OUTPATIENT)
Dept: PEDIATRICS | Facility: CLINIC | Age: 69
End: 2024-08-15
Payer: MEDICARE

## 2024-08-15 ENCOUNTER — TELEPHONE (OUTPATIENT)
Dept: PEDIATRICS | Facility: CLINIC | Age: 69
End: 2024-08-15

## 2024-08-15 DIAGNOSIS — M81.0 OSTEOPOROSIS, UNSPECIFIED OSTEOPOROSIS TYPE, UNSPECIFIED PATHOLOGICAL FRACTURE PRESENCE: Primary | ICD-10-CM

## 2024-08-15 PROCEDURE — 99207 PR NO CHARGE NURSE ONLY: CPT | Performed by: INTERNAL MEDICINE

## 2024-08-15 PROCEDURE — 96372 THER/PROPH/DIAG INJ SC/IM: CPT | Performed by: INTERNAL MEDICINE

## 2024-08-15 PROCEDURE — 99207 PR NO CHARGE NURSE ONLY: CPT

## 2024-08-15 NOTE — PROGRESS NOTES
Clinic Administered Medication Documentation      Prolia Documentation    Indication: Prolia  (denosumab) is a prescription medicine used to treat osteoporosis in patients who:   Are at high risk for fracture, meaning patients who have had a fracture related to osteoporosis, or who have multiple risk factors for fracture.  Cannot use another osteoporosis medicine or other osteoporosis medicines did not work well.  The timeline for early/late injections would be 4 weeks early and any time after the 6 month robert. If a patient receives their injection late, then the subsequent injection would be 6 months from the date that they actually received the injection.    When was the last injection?  NA; first injection today 8/15/2024  Was the last injection at least 6 months ago? Yes  Has the prior authorization been completed?  Yes  Is there an active order (written within the past 365 days, with administrations remaining, not ) in the chart?  Yes   GFR Estimate   Date Value Ref Range Status   2024 >90 >60 mL/min/1.73m2 Final   2020 >90 >60 mL/min/[1.73_m2] Final     Comment:     Non  GFR Calc  Starting 2018, serum creatinine based estimated GFR (eGFR) will be   calculated using the Chronic Kidney Disease Epidemiology Collaboration   (CKD-EPI) equation.       Has patient had a GFR within the last 12 months? Yes   Is GFR under 30, or patient has a diagnosis of CKD4 or CKD5? Yes   Calcium   Date Value Ref Range Status   2024 9.2 8.8 - 10.4 mg/dL Final     Comment:     Reference intervals for this test were updated on 2024 to reflect our healthy population more accurately. There may be differences in the flagging of prior results with similar values performed with this method. Those prior results can be interpreted in the context of the updated reference intervals.   2020 9.2 8.5 - 10.1 mg/dL Final     Is calcium result 8.5 or above? Yes   Was the calcium done after  last Prolia injection? Yes   Is there a calcium order for 1 month post Prolia injection? Yes. Schedule patient for Calcium lab in next month.   Patient denies gastric bypass or parathyroid surgery in past 6 months? Yes - patient denies.   Patient denies dental work in the past two months involving drilling into the bone, such as implants/extractions, oral surgery or a tooth extraction that has not healed yet?  Yes  Patient denies plans for an emergency tooth extraction within the next week? Yes    The following steps were completed to comply with the REMS program for Prolia:  Reviewed information in the Medication Guide, including the serious risks of Prolia  and the symptoms of each risk.  Advised patient to seek prompt medical attention if they have signs or symptoms of any of the serious risks.  Provided each patient a copy of the Medication Guide and Patient Guide.    Prior to injection, verified patient identity using patient's name and date of birth. Medication was administered. Please see MAR and medication order for additional information. Patient instructed to remain in clinic for 15 minutes and report any adverse reaction to staff immediately.    Vial/Syringe: Syringe  Was this medication supplied by the patient? No  Verified that the patient has refills remaining in their prescription.    Patient tolerated.    Babs Dickens, RN

## 2024-08-15 NOTE — TELEPHONE ENCOUNTER
Patient received first prolia injection today 8/15/2024.    Would you like a calcium draw after first injection or just prior to next injection in February?    Saw there were two future calcium labs ordered without specification.    Babs Dickens RN

## 2024-08-19 NOTE — TELEPHONE ENCOUNTER
[employer] message sent to patient to relay provider message regarding Calcium checks with Prolia.    Babs Dickens RN

## 2024-09-03 ENCOUNTER — OFFICE VISIT (OUTPATIENT)
Dept: URGENT CARE | Facility: URGENT CARE | Age: 69
End: 2024-09-03
Payer: MEDICARE

## 2024-09-03 VITALS
HEART RATE: 73 BPM | RESPIRATION RATE: 18 BRPM | TEMPERATURE: 98.5 F | SYSTOLIC BLOOD PRESSURE: 107 MMHG | BODY MASS INDEX: 23.8 KG/M2 | WEIGHT: 136 LBS | DIASTOLIC BLOOD PRESSURE: 67 MMHG

## 2024-09-03 DIAGNOSIS — H01.132 ECZEMATOUS DERMATITIS OF UPPER AND LOWER EYELIDS OF BOTH EYES: Primary | ICD-10-CM

## 2024-09-03 DIAGNOSIS — H01.135 ECZEMATOUS DERMATITIS OF UPPER AND LOWER EYELIDS OF BOTH EYES: Primary | ICD-10-CM

## 2024-09-03 DIAGNOSIS — H01.134 ECZEMATOUS DERMATITIS OF UPPER AND LOWER EYELIDS OF BOTH EYES: Primary | ICD-10-CM

## 2024-09-03 DIAGNOSIS — H01.131 ECZEMATOUS DERMATITIS OF UPPER AND LOWER EYELIDS OF BOTH EYES: Primary | ICD-10-CM

## 2024-09-03 PROCEDURE — 99213 OFFICE O/P EST LOW 20 MIN: CPT | Performed by: FAMILY MEDICINE

## 2024-09-03 NOTE — PROGRESS NOTES
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    Plan:   Start with 1% HC cream.  Could consider something stronger such as TMC if not improving.      CHIEF COMPLAINT    Eyelid irritation.      HISTORY    This patient has developed a dermatitis of the eyelids in the last 2 to 3 weeks.  She stopped using various make-up but that did not completely help things.        EXAM  /67   Pulse 73   Temp 98.5  F (36.9  C) (Tympanic)   Resp 18   Wt 61.7 kg (136 lb)   LMP 04/17/2009   BMI 23.80 kg/m      Mild eczema-like dermatitis upper and lower lids bilateral.  Conjunctiva normal.  PERRL.

## 2025-02-11 ENCOUNTER — TELEPHONE (OUTPATIENT)
Dept: ENDOCRINOLOGY | Facility: CLINIC | Age: 70
End: 2025-02-11
Payer: MEDICARE

## 2025-02-11 DIAGNOSIS — M81.0 OSTEOPOROSIS, UNSPECIFIED OSTEOPOROSIS TYPE, UNSPECIFIED PATHOLOGICAL FRACTURE PRESENCE: Primary | ICD-10-CM

## 2025-02-11 DIAGNOSIS — Z92.29 PERSONAL HISTORY OF OTHER DRUG THERAPY: ICD-10-CM

## 2025-02-11 NOTE — TELEPHONE ENCOUNTER
Patient needs updated prolia CAM order, current order .     Please review, sign. Patient has appointment 25. Routing high priority. Once signed please route back to nurse pool high priority so we can make sure referral is authorized.         Thanks,   Susanna Ortiz RN

## 2025-02-11 NOTE — TELEPHONE ENCOUNTER
Upon chart review:   - Patient has an order for Prolia on file   - Referral is not authorized at this time     denosumab (PROLIA) injection 60 mg   [[id number]]  Order DetailsOrdered Dose: 60 mg Route: Subcutaneous Frequency: EVERY 6 MONTHS   Admin Dose: 60 mg      Scheduled Start Date/Time: 08/06/24 0000 End Date/Time: 07/31/25 2359 after 2 doses    denosumab (PROLIA) injection 60 mg   [[id number]]  Order DetailsOrdered Dose: 60 mg Route: Subcutaneous Frequency: EVERY 6 MONTHS   Admin Dose: 60 mg      Scheduled Start Date/Time: 02/17/25 0000 End Date/Time: 02/11/26 2359 after 2 doses      Appointments in Next Year      Feb 17, 2025 1:30 PM  Nurse [facility] with GEOVANNY BOLAND  [facility] ([facility] [phone]     Lis Lawrence, please review and complete authorization for the Prolia medication as appropriate.     Babs DOZIER RN   [facility]

## 2025-02-17 ENCOUNTER — ALLIED HEALTH/NURSE VISIT (OUTPATIENT)
Dept: PEDIATRICS | Facility: CLINIC | Age: 70
End: 2025-02-17
Payer: MEDICARE

## 2025-02-17 DIAGNOSIS — M81.0 OSTEOPOROSIS, UNSPECIFIED OSTEOPOROSIS TYPE, UNSPECIFIED PATHOLOGICAL FRACTURE PRESENCE: Primary | ICD-10-CM

## 2025-02-17 PROCEDURE — 96372 THER/PROPH/DIAG INJ SC/IM: CPT | Performed by: INTERNAL MEDICINE

## 2025-02-17 PROCEDURE — 99207 PR NO CHARGE NURSE ONLY: CPT

## 2025-02-17 PROCEDURE — 99207 PR NO CHARGE NURSE ONLY: CPT | Performed by: INTERNAL MEDICINE

## 2025-02-17 NOTE — PROGRESS NOTES
Clinic Administered Medication Documentation      Prolia Documentation    Indication: Prolia  (denosumab) is a prescription medicine used to treat osteoporosis in patients who:   Are at high risk for fracture, meaning patients who have had a fracture related to osteoporosis, or who have multiple risk factors for fracture.  Cannot use another osteoporosis medicine or other osteoporosis medicines did not work well.  The timeline for early/late injections would be 4 weeks early and any time after the 6 month robert. If a patient receives their injection late, then the subsequent injection would be 6 months from the date that they actually received the injection.      When was the last injection?  8/15/24  Was the last injection at least 6 months ago? Yes  Has the prior authorization been completed?  Yes  Is there an active order (written within the past 365 days, with administrations remaining, not ) in the chart?  Yes   GFR Estimate   Date Value Ref Range Status   2024 >90 >60 mL/min/1.73m2 Final   2020 >90 >60 mL/min/[1.73_m2] Final     Comment:     Non  GFR Calc  Starting 2018, serum creatinine based estimated GFR (eGFR) will be   calculated using the Chronic Kidney Disease Epidemiology Collaboration   (CKD-EPI) equation.       Has patient had a GFR within the last 12 months? Yes   Is GFR under 30, or patient has a diagnosis of CKD4 or CKD5? No   Patient denies gastric bypass or parathyroid surgery in past 6 months? Yes - patient denies.   Patient denies undergoing any dental procedures involving drilling into the bone, such as implants, extractions, or oral surgery, within the past two months that have not yet healed?  Yes - patient denies  Patient denies plans for an emergency tooth extraction within the next week? Yes    The following steps were completed to comply with the REMS program for Prolia:  Reviewed information in the Medication Guide, including the serious risks of  Prolia  and the symptoms of each risk.  Advised patient to seek prompt medical attention if they have signs or symptoms of any of the serious risks.  Provided each patient a copy of the Medication Guide and Patient Guide.    Prior to injection, verified patient identity using patient's name and date of birth. Medication was administered. Please see MAR and medication order for additional information. Patient instructed to remain in clinic for 15 minutes and report any adverse reaction to staff immediately.    Vial/Syringe: Syringe  Was this medication supplied by the patient? No  Verified that the patient has administrations remaining in their prescription.    Sub Q Tissue L upper arm.   NDC: 89402-980-58   LOT: [id number]     RN provided prolia education to patient, read through information. Patient was given an opportunity to ask questions, verbalized understanding of plan, and is agreeable. Sent patient home with educational handouts.       Susanna Ortiz RN

## 2025-02-23 DIAGNOSIS — Z20.828 EXPOSURE TO THE FLU: Primary | ICD-10-CM

## 2025-02-23 RX ORDER — OSELTAMIVIR PHOSPHATE 75 MG/1
75 CAPSULE ORAL DAILY
Qty: 7 CAPSULE | Refills: 0 | Status: SHIPPED | OUTPATIENT
Start: 2025-02-23 | End: 2025-03-02

## 2025-04-14 ENCOUNTER — VIRTUAL VISIT (OUTPATIENT)
Dept: PEDIATRICS | Facility: CLINIC | Age: 70
End: 2025-04-14
Payer: MEDICARE

## 2025-04-14 DIAGNOSIS — Z13.6 CARDIOVASCULAR SCREENING; LDL GOAL LESS THAN 160: ICD-10-CM

## 2025-04-14 DIAGNOSIS — Z13.0 SCREENING FOR BLOOD DISEASE: ICD-10-CM

## 2025-04-14 DIAGNOSIS — Z13.29 SCREENING FOR THYROID DISORDER: ICD-10-CM

## 2025-04-14 DIAGNOSIS — M81.0 OSTEOPOROSIS, UNSPECIFIED OSTEOPOROSIS TYPE, UNSPECIFIED PATHOLOGICAL FRACTURE PRESENCE: Primary | ICD-10-CM

## 2025-04-14 DIAGNOSIS — Z11.59 SCREENING FOR MEASLES: ICD-10-CM

## 2025-04-14 PROCEDURE — 98005 SYNCH AUDIO-VIDEO EST LOW 20: CPT | Performed by: INTERNAL MEDICINE

## 2025-04-14 NOTE — PROGRESS NOTES
Danika is a 70 year old who is being evaluated via a billable video visit.          Assessment & Plan     (M81.0) Osteoporosis, unspecified osteoporosis type, unspecified pathological fracture presence  (primary encounter diagnosis)  Comment:   Plan: Vitamin D Deficiency     Following up with endocrinology, continues Prolia          Will return for fasting lab draw prior to upcoming physical.  Also check measles serology:  (Z13.6) CARDIOVASCULAR SCREENING; LDL GOAL LESS THAN 160  Comment:   Plan: Lipid panel reflex to direct LDL Fasting,         Comprehensive metabolic panel (BMP + Alb, Alk         Phos, ALT, AST, Total. Bili, TP)          (Z13.29) Screening for thyroid disorder  Comment:   Plan: TSH with free T4 reflex          (Z13.0) Screening for blood disease  Comment:   Plan: CBC with platelets and differential          (Z11.59) Screening for measles  Comment:   Plan: Rubeola Antibody IgG               Subjective   Danika is a 70 year old, presenting for the following health issues:  No chief complaint on file.  labwork    Video Start Time: 5:15 PM    HPI      Presents today requesting lab work prior to upcoming physical.  In addition has questions about measles immunity and whether she needs a booster/unclear whether she had measles as a child.                Objective           Vitals:  No vitals were obtained today due to virtual visit.    Physical Exam   GENERAL: alert and no distress  EYES: Eyes grossly normal to inspection.  No discharge or erythema, or obvious scleral/conjunctival abnormalities.  RESP: No audible wheeze, cough, or visible cyanosis.    SKIN: Visible skin clear. No significant rash, abnormal pigmentation or lesions.  NEURO: Cranial nerves grossly intact.  Mentation and speech appropriate for age.  PSYCH: Appropriate affect, tone, and pace of words          Video-Visit Details    Type of service:  Video Visit   Video End Time:5:24 PM  Originating Location (pt. Location): Home    Distant  Location (provider location):  On-site  Platform used for Video Visit: Donato  Signed Electronically by: Dexter Floyd MD

## 2025-04-30 SDOH — HEALTH STABILITY: PHYSICAL HEALTH: ON AVERAGE, HOW MANY MINUTES DO YOU ENGAGE IN EXERCISE AT THIS LEVEL?: 90 MIN

## 2025-04-30 SDOH — HEALTH STABILITY: PHYSICAL HEALTH: ON AVERAGE, HOW MANY DAYS PER WEEK DO YOU ENGAGE IN MODERATE TO STRENUOUS EXERCISE (LIKE A BRISK WALK)?: 6 DAYS

## 2025-04-30 ASSESSMENT — SOCIAL DETERMINANTS OF HEALTH (SDOH): HOW OFTEN DO YOU GET TOGETHER WITH FRIENDS OR RELATIVES?: MORE THAN THREE TIMES A WEEK

## 2025-05-05 ENCOUNTER — OFFICE VISIT (OUTPATIENT)
Dept: PEDIATRICS | Facility: CLINIC | Age: 70
End: 2025-05-05
Attending: INTERNAL MEDICINE
Payer: MEDICARE

## 2025-05-05 VITALS
BODY MASS INDEX: 24.1 KG/M2 | DIASTOLIC BLOOD PRESSURE: 60 MMHG | OXYGEN SATURATION: 97 % | HEIGHT: 63 IN | SYSTOLIC BLOOD PRESSURE: 100 MMHG | TEMPERATURE: 96.8 F | WEIGHT: 136 LBS | HEART RATE: 59 BPM | RESPIRATION RATE: 16 BRPM

## 2025-05-05 DIAGNOSIS — K21.9 GASTROESOPHAGEAL REFLUX DISEASE WITHOUT ESOPHAGITIS: ICD-10-CM

## 2025-05-05 DIAGNOSIS — N95.2 VAGINAL ATROPHY: ICD-10-CM

## 2025-05-05 DIAGNOSIS — J47.9 BRONCHIECTASIS WITHOUT COMPLICATION (H): ICD-10-CM

## 2025-05-05 DIAGNOSIS — M81.0 OSTEOPOROSIS, UNSPECIFIED OSTEOPOROSIS TYPE, UNSPECIFIED PATHOLOGICAL FRACTURE PRESENCE: ICD-10-CM

## 2025-05-05 DIAGNOSIS — R42 VERTIGO: ICD-10-CM

## 2025-05-05 DIAGNOSIS — Z12.31 ENCOUNTER FOR SCREENING MAMMOGRAM FOR BREAST CANCER: ICD-10-CM

## 2025-05-05 DIAGNOSIS — Z00.00 ENCOUNTER FOR MEDICARE ANNUAL WELLNESS EXAM: Primary | ICD-10-CM

## 2025-05-05 PROCEDURE — 3074F SYST BP LT 130 MM HG: CPT | Performed by: INTERNAL MEDICINE

## 2025-05-05 PROCEDURE — 1126F AMNT PAIN NOTED NONE PRSNT: CPT | Performed by: INTERNAL MEDICINE

## 2025-05-05 PROCEDURE — 3078F DIAST BP <80 MM HG: CPT | Performed by: INTERNAL MEDICINE

## 2025-05-05 PROCEDURE — 99214 OFFICE O/P EST MOD 30 MIN: CPT | Mod: 25 | Performed by: INTERNAL MEDICINE

## 2025-05-05 PROCEDURE — G0439 PPPS, SUBSEQ VISIT: HCPCS | Performed by: INTERNAL MEDICINE

## 2025-05-05 ASSESSMENT — PAIN SCALES - GENERAL: PAINLEVEL_OUTOF10: NO PAIN (0)

## 2025-05-05 NOTE — PROGRESS NOTES
Preventive Care Visit  [facility] CALLUM Floyd MD, Internal Medicine - Pediatrics  May 5, 2025      Assessment & Plan     (Z00.00) Encounter for Medicare annual wellness exam  (primary encounter diagnosis)  Recent labwork reviewed    (R42) Vertigo  Comment:   Plan: intermittent.  Persistent since episode 2023.  Met with neurology then PT for vestibular rehab.  Symptoms much better but still occur with certain positions.  Discussed referral to [facility]/ states she will consider    (K21.9) Gastroesophageal reflux disease without esophagitis  Comment:   Plan: continues famotidine, symptoms are well controlled, avoids food triggers.  Discussed EGD if symptoms recur on current regimen    (J47.9) Bronchiectasis without complication (H)  Comment:   Plan: mild, noted on prior CT, asymptomatic    (M81.0) Osteoporosis, unspecified osteoporosis type, unspecified pathological fracture presence  Comment:   Plan: continues Prolia    (N95.2) Vaginal atrophy  Comment:   Plan: topical estrogen     (Z12.31) Encounter for screening mammogram for breast cancer  Comment:   Plan: MA Screen Bilateral w/Alexander                    Counseling  Appropriate preventive services were addressed with this patient via screening, questionnaire, or discussion as appropriate for fall prevention, nutrition, physical activity, Tobacco-use cessation, social engagement, weight loss and cognition.  Checklist reviewing preventive services available has been given to the patient.  Reviewed patient's diet, addressing concerns and/or questions.   Patient reported safety concerns were addressed today.The patient was provided with written information regarding signs of hearing loss.           Raymond Garnica is a 70 year old, presenting for the following:  Wellness Visit        5/5/2025     9:18 AM   Additional Questions   Roomed by Shannan UMANA CMA   Accompanied by          5/5/2025     9:18 AM   Patient  Reported Additional Medications   Patient reports taking the following new medications n/a           [facility]           Advance Care Planning    Document on file is a Health Care Directive or POLST.        4/30/2025   General Health   How would you rate your overall physical health? Good   Feel stress (tense, anxious, or unable to sleep) Not at all         4/30/2025   Nutrition   Diet: Other   If other, please elaborate: I avoid acidic foods due to reflux - I avoid too much milk due to some lactose intolerance.  I also try to emphasize protein and avoid too many carbohydrates to try to avoid weight gain.         4/30/2025   Exercise   Days per week of moderate/strenous exercise 6 days   Average minutes spent exercising at this level 90 min         4/30/2025   Social Factors   Frequency of gathering with friends or relatives More than three times a week   Worry food won't last until get money to buy more No   Food not last or not have enough money for food? No   Do you have housing? (Housing is defined as stable permanent housing and does not include staying outside in a car, in a tent, in an abandoned building, in an overnight shelter, or couch-surfing.) Yes   Are you worried about losing your housing? No   Lack of transportation? No   Unable to get utilities (heat,electricity)? No         5/5/2025   Fall Risk   Gait Speed Test (Document in seconds) 4.75   Gait Speed Test Interpretation Less than or equal to 5.00 seconds - PASS          4/30/2025   Activities of Daily Living- Home Safety   Needs help with the following daily activites None of the above   Safety concerns in the home No grab bars in the bathroom         4/30/2025   Dental   Dentist two times every year? Yes         4/30/2025   Hearing Screening   Hearing concerns? (!) I FEEL THAT PEOPLE ARE MUMBLING OR NOT SPEAKING CLEARLY.    (!) I NEED TO ASK PEOPLE TO SPEAK UP OR REPEAT THEMSELVES.    (!) IT'S HARD TO FOLLOW A CONVERSATION IN A NOISY RESTAURANT OR  CROWDED ROOM.       Multiple values from one day are sorted in reverse-chronological order         4/30/2025   Driving Risk Screening   Patient/family members have concerns about driving No         4/30/2025   General Alertness/Fatigue Screening   Have you been more tired than usual lately? No         4/30/2025   Urinary Incontinence Screening   Bothered by leaking urine in past 6 months No         Today's PHQ-2 Score:       5/5/2025     8:57 AM   PHQ-2 ( 1999 Pfizer)   Q1: Little interest or pleasure in doing things 0   Q2: Feeling down, depressed or hopeless 0   PHQ-2 Score 0    Q1: Little interest or pleasure in doing things Not at all   Q2: Feeling down, depressed or hopeless Not at all   PHQ-2 Score 0       Patient-reported           4/30/2025   Substance Use   Alcohol more than 3/day or more than 7/wk No   Do you have a current opioid prescription? No   How severe/bad is pain from 1 to 10? 1/10   Do you use any other substances recreationally? no     Social History     Tobacco Use    Smoking status: Never     Passive exposure: Never    Smokeless tobacco: Never   Vaping Use    Vaping status: Never Used   Substance Use Topics    Alcohol use: Yes     Comment: Increased during giovani - varies- at start quarantine -none now    Drug use: No           7/19/2024   LAST FHS-7 RESULTS   1st degree relative breast or ovarian cancer No   Any relative bilateral breast cancer No   Any male have breast cancer No   Any ONE woman have BOTH breast AND ovarian cancer No   Any woman with breast cancer before 50yrs No   2 or more relatives with breast AND/OR ovarian cancer No   2 or more relatives with breast AND/OR bowel cancer No              History of abnormal Pap smear: No - age 65 or older with adequate negative prior screening test results (3 consecutive negative cytology results, 2 consecutive negative cotesting results, or 2 consecutive negative HrHPV test results within 10 years, with the most recent test occurring within  the recommended screening interval for the test used)        Latest Ref Rng & Units 2023    11:17 AM 2019     9:18 AM 2019     9:15 AM   PAP / HPV   PAP  Negative for Intraepithelial Lesion or Malignancy (NILM)      PAP (Historical)   NIL     HPV 16 DNA Negative Negative   Negative    HPV 18 DNA Negative Negative   Negative    Other HR HPV Negative Negative   Negative      ASCVD Risk   The 10-year ASCVD risk score (Sim MARQUEZ, et al., 2019) is: 4%    Values used to calculate the score:      Age: 70 years      Sex: Female      Is Non- : No      Diabetic: No      Tobacco smoker: No      Systolic Blood Pressure: 100 mmHg      Is BP treated: No      HDL Cholesterol: 89 mg/dL      Total Cholesterol: 174 mg/dL            Reviewed and updated as needed this visit by Provider                    Patient Active Problem List   Diagnosis    Gastroesophageal reflux disease without esophagitis    Status post bariatric surgery    CARDIOVASCULAR SCREENING; LDL GOAL LESS THAN 160    Osteoporosis    Symptomatic menopausal or female climacteric states    Decreased libido    Vaginal atrophy    Vertigo    Bronchiectasis without complication (H)     Past Surgical History:   Procedure Laterality Date    CATARACT IOL, RT/LT  2016    Z NONSPECIFIC PROCEDURE      wisdom teeth extraction,abstracted    Z NONSPECIFIC PROCEDURE      T&A,abstracted    Z NONSPECIFIC PROCEDURE      2 natural childbirths,abstracted    [facility] NONSPECIFIC PROCEDURE  1982    gastric stapling ast [facility]       Social History     Tobacco Use    Smoking status: Never     Passive exposure: Never    Smokeless tobacco: Never   Substance Use Topics    Alcohol use: Yes     Comment: Increased during giovani - varies- at start quarantine -none now     Family History   Problem Relation Age of Onset    Arthritis Father         B:192      Diabetes Father     Respiratory Father     Cardiovascular Father     Hypertension Mother      Alcohol/Drug Mother         B: alive    Lung Cancer Mother 89        lung cancer ;  age 91    Family History Negative Sister     Family History Negative Sister     Family History Negative Brother          Current Outpatient Medications   Medication Sig Dispense Refill    CALCIUM PLUS TABS   OR Take 1 tablet by mouth 2 times daily      CENTRUM SILVER OR 1 tablet daily      Collagen-Vitamin C-Biotin (COLLAGEN 1500/C PO) Take 1 tablet by mouth daily      estradiol (ESTRACE) 0.1 MG/GM vaginal cream PLACE 1 GRAM VAGINALLY 2 TIMES EVERY WEEK 42.5 g 5    famotidine (PEPCID) 20 MG tablet Take 1 tablet (20 mg) by mouth 2 times daily 60 tablet 0    VITAMIN B-12 500 MCG OR TABS 1 sublingual qday 30 5     Current providers sharing in care for this patient include:  Patient Care Team:  Dexter Floyd MD as PCP - General (Internal Medicine)  Gena Hunter MD as MD (Pulmonary Disease)  Nguyễn Boland MD as MD (Neurology)  Fe Reyna MD as Hospitalist (Endocrinology, Diabetes, and Metabolism)  Dexter Floyd MD as Assigned PCP  Fe Reyna MD as Assigned Endocrinology Provider  Gena Hunter MD as Assigned Neuroscience Provider    The following health maintenance items are reviewed in Epic and correct as of today:  Health Maintenance   Topic Date Due    ANNUAL REVIEW OF HM ORDERS  Never done    RSV VACCINE (1 - Risk 60-74 years 1-dose series) Never done    MEDICARE ANNUAL WELLNESS VISIT  2025    MAMMO SCREENING  2025    FALL RISK ASSESSMENT  2026    DTAP/TDAP/TD IMMUNIZATION (2 - Td or Tdap) 2026    COLORECTAL CANCER SCREENING  2027    DIABETES SCREENING  2028    ADVANCE CARE PLANNING  2029    LIPID  2030    DEXA  2039    HEPATITIS C SCREENING  Completed    PHQ-2 (once per calendar year)  Completed    INFLUENZA VACCINE  Completed    Pneumococcal Vaccine: 50+ Years  Completed    ZOSTER IMMUNIZATION  Completed    COVID-19  "Vaccine  Completed    HPV IMMUNIZATION  Aged Out    MENINGITIS IMMUNIZATION  Aged Out    PAP  Discontinued         Review of Systems  Constitutional, neuro, ENT, endocrine, pulmonary, cardiac, gastrointestinal, genitourinary, musculoskeletal, integument and psychiatric systems are negative, except as otherwise noted.     Objective    Exam  /60 (BP Location: Right arm, Patient Position: Sitting)   Pulse 59   Temp 96.8  F (36  C) (Temporal)   Resp 16   Ht 1.61 m (5' 3.39\")   Wt 61.7 kg (136 lb)   LMP 04/17/2009   SpO2 97%   BMI 23.80 kg/m     Estimated body mass index is 23.8 kg/m  as calculated from the following:    Height as of this encounter: 1.61 m (5' 3.39\").    Weight as of this encounter: 61.7 kg (136 lb).    Physical Exam  GENERAL: alert and no distress  EYES: Eyes grossly normal to inspection, PERRL and conjunctivae and sclerae normal  HENT: ear canals and TM's normal, nose and mouth without ulcers or lesions  NECK: no adenopathy, no asymmetry, masses, or scars  RESP: lungs clear to auscultation - no rales, rhonchi or wheezes  CV: regular rate and rhythm, normal S1 S2, no S3 or S4, no murmur, click or rub, no peripheral edema  ABDOMEN: soft, nontender, no hepatosplenomegaly, no masses and bowel sounds normal  MS: no gross musculoskeletal defects noted, no edema  SKIN: no suspicious lesions or rashes  NEURO: Normal strength and tone, mentation intact and speech normal  PSYCH: mentation appears normal, affect normal/bright        5/5/2025   Mini Cog   Clock Draw Score 2 Normal   3 Item Recall 3 objects recalled   Mini Cog Total Score 5              Signed Electronically by: Dexter Floyd MD    "

## 2025-05-05 NOTE — PATIENT INSTRUCTIONS
Patient Education   Preventive Care Advice   This is general advice given by our system to help you stay healthy. However, your care team may have specific advice just for you. Please talk to your care team about your preventive care needs.  Nutrition  Eat 5 or more servings of fruits and vegetables each day.  Try wheat bread, brown rice and whole grain pasta (instead of white bread, rice, and pasta).  Get enough calcium and vitamin D. Check the label on foods and aim for 100% of the RDA (recommended daily allowance).  Lifestyle  Exercise at least 150 minutes each week  (30 minutes a day, 5 days a week).  Do muscle strengthening activities 2 days a week. These help control your weight and prevent disease.  No smoking.  Wear sunscreen to prevent skin cancer.  Have a dental exam and cleaning every 6 months.  Yearly exams  See your health care team every year to talk about:  Any changes in your health.  Any medicines your care team has prescribed.  Preventive care, family planning, and ways to prevent chronic diseases.  Shots (vaccines)   HPV shots (up to age 26), if you've never had them before.  Hepatitis B shots (up to age 59), if you've never had them before.  COVID-19 shot: Get this shot when it's due.  Flu shot: Get a flu shot every year.  Tetanus shot: Get a tetanus shot every 10 years.  Pneumococcal, hepatitis A, and RSV shots: Ask your care team if you need these based on your risk.  Shingles shot (for age 50 and up)  General health tests  Diabetes screening:  Starting at age 35, Get screened for diabetes at least every 3 years.  If you are younger than age 35, ask your care team if you should be screened for diabetes.  Cholesterol test: At age 39, start having a cholesterol test every 5 years, or more often if advised.  Bone density scan (DEXA): At age 50, ask your care team if you should have this scan for osteoporosis (brittle bones).  Hepatitis C: Get tested at least once in your life.  STIs (sexually  transmitted infections)  Before age 24: Ask your care team if you should be screened for STIs.  After age 24: Get screened for STIs if you're at risk. You are at risk for STIs (including HIV) if:  You are sexually active with more than one person.  You don't use condoms every time.  You or a partner was diagnosed with a sexually transmitted infection.  If you are at risk for HIV, ask about PrEP medicine to prevent HIV.  Get tested for HIV at least once in your life, whether you are at risk for HIV or not.  Cancer screening tests  Cervical cancer screening: If you have a cervix, begin getting regular cervical cancer screening tests starting at age 21.  Breast cancer scan (mammogram): If you've ever had breasts, begin having regular mammograms starting at age 40. This is a scan to check for breast cancer.  Colon cancer screening: It is important to start screening for colon cancer at age 45.  Have a colonoscopy test every 10 years (or more often if you're at risk) Or, ask your provider about stool tests like a FIT test every year or Cologuard test every 3 years.  To learn more about your testing options, visit:   .  For help making a decision, visit:   https://bit.ly/ip47156.  Prostate cancer screening test: If you have a prostate, ask your care team if a prostate cancer screening test (PSA) at age 55 is right for you.  Lung cancer screening: If you are a current or former smoker ages 50 to 80, ask your care team if ongoing lung cancer screenings are right for you.  For informational purposes only. Not to replace the advice of your health care provider. Copyright   2023 [facility]. All rights reserved. Clinically reviewed by the [facility] Transitions Program. [employer] 941869 - REV 01/24.  Eating Healthy Foods: Care Instructions  With every meal, you can make healthy food choices. Try to eat a variety of fruits, vegetables, whole grains, lean proteins, and low-fat dairy products. This can help  "you get the right balance of nutrients, including vitamins and minerals. Small changes add up over time. You can start by adding one healthy food to your meals each day.    Try to make half your plate fruits and vegetables, one-fourth whole grains, and one-fourth lean proteins. Try including dairy with your meals.   Eat more fruits and vegetables. Try to have them with most meals and snacks.   Foods for healthy eating        Fruits   These can be fresh, frozen, canned, or dried.  Try to choose whole fruit rather than fruit juice.  Eat a variety of colors.        Vegetables   These can be fresh, frozen, canned, or dried.  Beans, peas, and lentils count too.        Whole grains   Choose whole-grain breads, cereals, and noodles.  Try brown rice.        Lean proteins   These can include lean meat, poultry, fish, and eggs.  You can also have tofu, beans, peas, lentils, nuts, and seeds.        Dairy   Try milk, yogurt, and cheese.  Choose low-fat or fat-free when you can.  If you need to, use lactose-free milk or fortified plant-based milk products, such as soy milk.        Water   Drink water when you're thirsty.  Limit sugar-sweetened drinks, including soda, fruit drinks, and sports drinks.  Where can you learn more?  Go to https://www.Weichaishi.com.net/patiented  Enter T756 in the search box to learn more about \"Eating Healthy Foods: Care Instructions.\"  Current as of: October 7, 2024  Content Version: 14.4 2024-2025 [employer].   Care instructions adapted under license by your healthcare professional. If you have questions about a medical condition or this instruction, always ask your healthcare professional. [employer] disclaims any warranty or liability for your use of this information.    Hearing Loss: Care Instructions  Overview     Hearing loss is a sudden or slow decrease in how well you hear. It can range from slight to profound. Permanent hearing loss can occur with aging. It also can " happen when you are exposed long-term to loud noise. Examples include listening to loud music, riding motorcycles, or being around other loud machines.  Hearing loss can affect your work and home life. It can make you feel lonely or depressed. You may feel that you have lost your independence. But hearing aids and other devices can help you hear better and feel connected to others.  Follow-up care is a key part of your treatment and safety. Be sure to make and go to all appointments, and call your doctor if you are having problems. It's also a good idea to know your test results and keep a list of the medicines you take.  How can you care for yourself at home?  Avoid loud noises whenever possible. This helps keep your hearing from getting worse.  Always wear hearing protection around loud noises.  Wear a hearing aid as directed.  A professional can help you pick a hearing aid that will work best for you.  You can also get hearing aids over the counter for mild to moderate hearing loss.  Have hearing tests as your doctor suggests. They can show whether your hearing has changed. Your hearing aid may need to be adjusted.  Use other devices as needed. These may include:  Telephone amplifiers and hearing aids that can connect to a television, stereo, radio, or microphone.  Devices that use lights or vibrations. These alert you to the doorbell, a ringing telephone, or a baby monitor.  Television closed-captioning. This shows the words at the bottom of the screen. Most new TVs can do this.  TTY (text telephone). This lets you type messages back and forth on the telephone instead of talking or listening. These devices are also called TDD. When messages are typed on the keyboard, they are sent over the phone line to a receiving TTY. The message is shown on a monitor.  Use text messaging, social media, and email if it is hard for you to communicate by telephone.  Try to learn a listening technique called speechreading. It is  "not lipreading. You pay attention to people's gestures, expressions, posture, and tone of voice. These clues can help you understand what a person is saying. Face the person you are talking to, and have them face you. Make sure the lighting is good. You need to see the other person's face clearly.  Think about counseling if you need help to adjust to your hearing loss.  When should you call for help?  Watch closely for changes in your health, and be sure to contact your doctor if:    You think your hearing is getting worse.     You have new symptoms, such as dizziness or nausea.   Where can you learn more?  Go to https://www.Parkplatzking.net/patiented  Enter R798 in the search box to learn more about \"Hearing Loss: Care Instructions.\"  Current as of: October 27, 2024  Content Version: 14.4    0511-9972 [employer].   Care instructions adapted under license by your healthcare professional. If you have questions about a medical condition or this instruction, always ask your healthcare professional. [employer] disclaims any warranty or liability for your use of this information.       "

## 2025-05-30 ENCOUNTER — MYC MEDICAL ADVICE (OUTPATIENT)
Dept: PEDIATRICS | Facility: CLINIC | Age: 70
End: 2025-05-30
Payer: MEDICARE

## 2025-05-30 DIAGNOSIS — R42 VERTIGO: Primary | ICD-10-CM

## 2025-05-30 NOTE — TELEPHONE ENCOUNTER
NDBC Referral printed, partially completed, and placed on provider's desk.    Priyanka Tucker on 5/30/2025 at 10:14 AM

## 2025-06-02 ENCOUNTER — PATIENT OUTREACH (OUTPATIENT)
Dept: CARE COORDINATION | Facility: CLINIC | Age: 70
End: 2025-06-02
Payer: MEDICARE

## 2025-06-12 ENCOUNTER — TRANSFERRED RECORDS (OUTPATIENT)
Dept: HEALTH INFORMATION MANAGEMENT | Facility: CLINIC | Age: 70
End: 2025-06-12
Payer: MEDICARE

## 2025-07-03 DIAGNOSIS — N95.2 VAGINAL ATROPHY: ICD-10-CM

## 2025-07-07 RX ORDER — ESTRADIOL 0.1 MG/G
CREAM VAGINAL
Qty: 42.5 G | Refills: 0 | Status: SHIPPED | OUTPATIENT
Start: 2025-07-07

## 2025-07-18 ENCOUNTER — TRANSFERRED RECORDS (OUTPATIENT)
Dept: HEALTH INFORMATION MANAGEMENT | Facility: CLINIC | Age: 70
End: 2025-07-18
Payer: MEDICARE

## 2025-07-19 ENCOUNTER — LAB (OUTPATIENT)
Dept: LAB | Facility: CLINIC | Age: 70
End: 2025-07-19
Payer: MEDICARE

## 2025-07-19 DIAGNOSIS — M81.0 AGE-RELATED OSTEOPOROSIS WITHOUT CURRENT PATHOLOGICAL FRACTURE: ICD-10-CM

## 2025-07-19 DIAGNOSIS — Z98.84 STATUS POST BARIATRIC SURGERY: ICD-10-CM

## 2025-07-19 DIAGNOSIS — Z78.0 ASYMPTOMATIC MENOPAUSAL STATE: ICD-10-CM

## 2025-07-19 DIAGNOSIS — K21.9 GASTROESOPHAGEAL REFLUX DISEASE WITHOUT ESOPHAGITIS: ICD-10-CM

## 2025-07-19 DIAGNOSIS — Z92.29 HISTORY OF BISPHOSPHONATE THERAPY: ICD-10-CM

## 2025-07-19 DIAGNOSIS — M81.0 OSTEOPOROSIS, UNSPECIFIED OSTEOPOROSIS TYPE, UNSPECIFIED PATHOLOGICAL FRACTURE PRESENCE: ICD-10-CM

## 2025-07-19 DIAGNOSIS — Z92.29 PERSONAL HISTORY OF OTHER DRUG THERAPY: ICD-10-CM

## 2025-07-19 PROCEDURE — 82310 ASSAY OF CALCIUM: CPT

## 2025-07-19 PROCEDURE — 36415 COLL VENOUS BLD VENIPUNCTURE: CPT

## 2025-07-20 LAB — CALCIUM SERPL-MCNC: 9.5 MG/DL (ref 8.8–10.4)

## 2025-07-21 ENCOUNTER — HOSPITAL ENCOUNTER (OUTPATIENT)
Dept: MAMMOGRAPHY | Facility: CLINIC | Age: 70
Discharge: HOME | End: 2025-07-21
Attending: INTERNAL MEDICINE | Admitting: INTERNAL MEDICINE
Payer: MEDICARE

## 2025-07-21 DIAGNOSIS — Z12.31 ENCOUNTER FOR SCREENING MAMMOGRAM FOR BREAST CANCER: ICD-10-CM

## 2025-07-21 PROCEDURE — 77063 BREAST TOMOSYNTHESIS BI: CPT

## 2025-07-23 ENCOUNTER — OFFICE VISIT (OUTPATIENT)
Dept: ENDOCRINOLOGY | Facility: CLINIC | Age: 70
End: 2025-07-23
Payer: MEDICARE

## 2025-07-23 VITALS
SYSTOLIC BLOOD PRESSURE: 109 MMHG | HEART RATE: 69 BPM | BODY MASS INDEX: 24.54 KG/M2 | TEMPERATURE: 97.9 F | HEIGHT: 63 IN | WEIGHT: 138.5 LBS | OXYGEN SATURATION: 97 % | DIASTOLIC BLOOD PRESSURE: 66 MMHG | RESPIRATION RATE: 16 BRPM

## 2025-07-23 DIAGNOSIS — Z78.0 ASYMPTOMATIC MENOPAUSAL STATE: ICD-10-CM

## 2025-07-23 DIAGNOSIS — M81.0 OSTEOPOROSIS, UNSPECIFIED OSTEOPOROSIS TYPE, UNSPECIFIED PATHOLOGICAL FRACTURE PRESENCE: Primary | ICD-10-CM

## 2025-07-23 DIAGNOSIS — Z92.29 PERSONAL HISTORY OF OTHER DRUG THERAPY: ICD-10-CM

## 2025-07-23 DIAGNOSIS — M81.0 AGE-RELATED OSTEOPOROSIS WITHOUT CURRENT PATHOLOGICAL FRACTURE: ICD-10-CM

## 2025-07-23 DIAGNOSIS — M89.9 BONE DISEASE: ICD-10-CM

## 2025-07-23 DIAGNOSIS — Z98.84 STATUS POST BARIATRIC SURGERY: ICD-10-CM

## 2025-07-23 DIAGNOSIS — Z92.29 HISTORY OF BISPHOSPHONATE THERAPY: ICD-10-CM

## 2025-07-23 DIAGNOSIS — K21.9 GASTROESOPHAGEAL REFLUX DISEASE WITHOUT ESOPHAGITIS: ICD-10-CM

## 2025-07-23 PROCEDURE — 3074F SYST BP LT 130 MM HG: CPT | Performed by: INTERNAL MEDICINE

## 2025-07-23 PROCEDURE — G2211 COMPLEX E/M VISIT ADD ON: HCPCS | Performed by: INTERNAL MEDICINE

## 2025-07-23 PROCEDURE — 3078F DIAST BP <80 MM HG: CPT | Performed by: INTERNAL MEDICINE

## 2025-07-23 PROCEDURE — 99214 OFFICE O/P EST MOD 30 MIN: CPT | Performed by: INTERNAL MEDICINE

## 2025-07-23 NOTE — NURSING NOTE
MAMIE BOLAND EA:    02/17/25  08/15/24    RECLAST:    09/09/22 08/20/21 08/07/20 08/01/19 07/20/18 06/23/17

## 2025-07-23 NOTE — PATIENT INSTRUCTIONS
[facility]  Dr Reyna, Endocrinology Department    [facility]   [address] [id number]  [place] [address]  Appointment [phone]  Fax: [phone]  [facility]: Monday - Thursday      Please check the cost coverage and copay with insurance before recommended tests, services and medications (especially if new medications are prescribed).     If ordered, please get blood work done 1 week prior to your next appointment so they will be available to Dr. Reyna at your visit.    Continue PROLIA. Next PROLIA  2025 ( as scheduled), 2026  DEXA 2026 at [place].  Follow up after that.    PROLIA Scheduling information:  It will be done in clinic.   You need to make nurse only appointment. (call [facility]: [phone] or [place]:[phone] and schedule Nurse only appointment)  You will need labs few days prior to PROLIA (calcium labs)- please schedule lab appointment.  PROLIA is every 6 months injection- so please schedule accordingly.  It is recommended NOT to miss or delay PROLIA injections.    Possible major side effects of Denosumab (PROLIA) include risk for atypical femur fractures, hypersensitivity, low calcium levels, osteonecrosis of jaw (which can manifest as jaw pain, osteomyelitis, osteitis, bone erosion, tooth/periodontal infection, toothache, gingival ulceration/erosion). Other s/e include but not limited to Hypertension, Headache and peripheral edema.   Always let your dentist lnow about the medication if plan for major dental procedure.    Indication: Prolia  (denosumab) is a prescription medicine used to treat osteoporosis in patients who:   Are at high risk for fracture, meaning patients who have had a fracture related to osteoporosis, or who have multiple risk factors for fracture   Cannot use another osteoporosis medicine or other osteoporosis medicines did not work well   The timeline for early/late injections would be 4 weeks early and any  time after the 6 month robert. If a patient receives their injection late, then the subsequent injection would be 6 months from the date that they actually received the injection      The pt was advised to  Maintain an adequate calcium and vitamin D intake and to supplement vitamin D if needed to maintain serum levels of 25 hydroxy D (25 OH D) between 30-60 ng/ml.  Limit alcohol intake to no more than 2 servings per day.  Limit caffeine intake.  Maintain an active lifestyle including weight-bearing exercises for at least 30 mins daily.  Take measures to reduce the risk of falling.     You should get 1000- 1200 mg/day calcium in divided doses of no more than 500 mg/dose.  This INCLUDES what is in your food as well as what is in any supplements you may be taking.    Vit D about 800-1000 IU/day ( unless you have vit D deficiency- in that case higher dose)  Dietary sources of calcium:: These also contain vitamin D  Milk                            8 oz            300 mg calcium  Yogurt                          1 cup           400 mg calcium   Hard cheese                     1.5 oz          300 mg  Cottage cheese                  2 cup           300 mg  Orange juice with Calcium       8 oz            300 mg  Low fat dairy sources are recommended        You should get 30 minutes of moderate weight bearing exercise on most days of the week .  Weight bearing exercise includes such things as walking, jogging, hiking, dancing.  You should also get Strength training 2 or more times/week in addition to other weight -being exercise. Strength training uses weight or resistance beyond that seen in everyday activities -(pilates, weight training with free weights, weight machines or resistance bands)     Living with Osteoporosis: Preventing Fractures  If you have osteoporosis, you can do a lot to reduce its effect on your life. Knowing how to prevent fractures and spinal curvature can help you live more comfortably and safely with this  disease.  Reducing your risk for fractures  The most common fracture sites in people with osteoporosis are the wrist, spine, and hip. These fractures are often caused by accidents and falls. All fractures are painful and may limit what you can do. But hip fractures are very serious. They often need surgery, and it can take months to recover. To reduce your risk for fractures:  Get regular exercise. Try walking, swimming, or weight training.  Eat foods that are rich in calcium, or take calcium supplements.  Make your home safe to help avoid accidents.  Take extra precautions not to fall in risky areas, such as icy sidewalks.  Understanding spinal fractures  Your spine is made up of many bones called vertebrae. Osteoporosis can cause the vertebrae in your spine to collapse. As a result, your upper back may arch forward, creating a curvature. Spine fractures may also result from back strain and bad posture. You will also lose height. Your lower spine must then adjust to keep your body balanced. This can cause back pain. To prevent or lessen these spinal changes:  Practice good posture.  Use proper techniques if you need to lift heavy objects.  Do back exercises to help your posture.  Lie on your back when you have pain.  Ask your healthcare provider about these and other ways to help your spine.  [employer] last reviewed this educational content on 5/1/2018 2000-2021 [employer]. All rights reserved. This information is not intended as a substitute for professional medical care. Always follow your healthcare professional's instructions.          Living with Osteoporosis: Regular Exercise  If you have osteoporosis, exercise is vital for your health. It can prevent bone fractures and spine changes. It will slow bone loss. Exercise will strengthen your body. It can also be fun. A variety of exercises is best. See below for exercises that can help you. But before you start, talk with your healthcare provider  to be sure these exercises are right for you.   Resistance exercises. These build muscle strength and maintain bone mass. They also make you less prone to injury. Exercises include lifting small weights, doing push-ups and sit-ups, using elastic exercise bands, and using weight machines.   Weight-bearing activities. These help your whole body. They also help you maintain bone mass. Activities include walking, dancing, and housework.   Non-weight-bearing exercises. These help prevent back strain and pain. They do this by building the trunk and leg muscles. Exercises that help with flexibility can prevent falls. Examples include swimming, water exercise, and stretching.   Staying safe  Here are tips to stay safe:   Always check with your healthcare provider before starting any new exercise program.  Use weights only as instructed.  Stop any exercise that causes pain.  [employer] last reviewed this educational content on 5/1/2018 2000-2021 [employer]. All rights reserved. This information is not intended as a substitute for professional medical care. Always follow your healthcare professional's instructions.          Preventing Osteoporosis: Avoiding Bone Loss  Certain factors can speed up bone loss or decrease bone growth. For example, alcohol, cigarettes, and certain medicines reduce bone mass. Some foods make it hard for your body to absorb calcium.  Things to avoid  Here are things to avoid to help prevent osteoporosis:  Alcohol. This is toxic to bones. It is a major cause of bone loss. Heavy drinking can cause osteoporosis even if you have no other risk factors.  Smoking. This reduces bone mass. Smoking may also interfere with estrogen levels and cause early menopause.  Inactivity. Not being active makes your bones lose strength and become thinner. Over time, thin bones may break. Women who aren't active are at a high risk for osteoporosis.  Certain medicines. Some medicines, such as cortisone,  increase bone loss. They also decrease bone growth. Ask your healthcare provider about any side effects of your medicines, and how to prevent them.  Protein-rich or salty foods. Eaten in large amounts, these foods may deplete calcium.  Caffeine. This increases calcium loss. People who drink a lot of coffee, tea, or soda lose more calcium than those who don't.  [employer] last reviewed this educational content on 5/1/2018 2000-2021 [employer]. All rights reserved. This information is not intended as a substitute for professional medical care. Always follow your healthcare professional's instructions.          Preventing Osteoporosis: Meeting Your Calcium Needs  Your body needs calcium to build and repair bones. But it can't make calcium on its own. That's why it's important to eat calcium-rich foods. Some foods are naturally rich in calcium. Others have calcium added (fortified). It's best to get calcium from the foods you eat. But if you can't get enough, you may want to take calcium supplements. To meet your daily calcium needs, try the foods listed below.                          Dairy Fish & beans Other sources   Source   Calcium (mg) per serving   Source   Calcium (mg) per serving   Source   Calcium (mg) per serving   Low-fat yogurt, plain   415 mg/8 oz.   Sardines, Atlantic, canned, with bones   351 mg/3 oz.   Oatmeal, instant, fortified   215 mg/1 cup   Nonfat milk   302 mg/1 cup   [place], sockeye, canned, with bones   239 mg/3 oz.   Tofu made with calcium sulfate   204 mg/3 oz.   Low-fat milk   297 mg/1 cup   Soybeans, fresh, boiled   131 mg/1/2 cup   Collards   179 mg/1/2 cup   Swiss cheese   272 mg/1 oz.   White beans, cooked   81 mg/1/2 cup   English muffin, whole wheat   175 mg/1 muffin   Cheddar cheese   205 mg/1 oz.   Navy beans, cooked   79 mg/1/2 cup   Kale   90 mg/1/2 cup   Ice cream strawberry   79 mg/1/2 cup           Orange, navel   56 mg/1 medium   Note: Calcium levels may vary  depending on brand and size.  Daily calcium needs  14 to 18 years old: 1,300 mg  19 to 30 years old: 1,000 mg  31 to 50 years old: 1,000 mg  51 to 70 years old, women: 1,200 mg  51 to 70 years old, men: 1,000 mg  Pregnant or nursin to 18 years old: 1,300 mg, 19 to 50 years old: 1,000 mg  Older than 70 (women and men): 1,200 mg   Jessie last reviewed this educational content on 2018-2021 [employer]. All rights reserved. This information is not intended as a substitute for professional medical care. Always follow your healthcare professional's instructions.

## 2025-07-23 NOTE — LETTER
7/23/2025      Danika Hyman  [address] [place] [address]      Dear Colleague,    Thank you for referring your patient, Danika Hyman, to the [facility]. Please see a copy of my visit note below.    Name: Danika Hyman  Seen for f/u of osteoporosis.   HPI:  Danika Hyman is a 70  year old female who presents for the evaluation of osteoperosis.  Other past medical history includes history of gastric bypass surgery, esophageal reflux disease.    H/o osteopenia- in 50s.  DEXA 2017- osteoporosis  Following that she was seen at [facility] [place].  At that time given her history of GERD and gastric bypass surgery, intravenous therapy was considered. Never been on oral anti-reoprtive therapy.    Started Reclast-- June 2017.  ( Was on Reclast [id number])    DEXA 7/2024: Low bone density (OSTEOPENIA).   INTERVAL CHANGE  -There has been a 2.2% decrease in lumbar spine BMD.  -There has been a 1.0% decrease in bilateral hip BMD.    Switched to PROLIA in 2024.    PROLIA dates: (gets at [facility])  2/2025 8/2024    RECLAST dates:  2023: Reclast on hold  09/09/22 08/20/21 08/07/20 08/01/19 07/20/18 06/23/17     Tolerated well.  No major complaints.  Has started to go to gym and has started weight training.  No fractures or falls.  Plans to retire in 4/2023.    DEXA  2018- showing- suggestion of a possible trend towards improvement of the lumbar spine, and no significant change of the total hip.    DEXA 2020: There is the suggestion of a possible trend towards improvement of the lumbar spine, and no significant change of the total hip.    DEXA 7/2022: Osteopenia, No significant change in bone density of the lumbar spine or hip(s).      RISK FACTORS:  Post-menopausal, Parent history of osteoporosis, Fracture of foot, Condition related to bone loss: gastric bypass, Follow-up osteopenia    H/o Gastric bypass at age 26 years.  Lost 80 lbs at that time and was able to keep it off.  H/o  bulimia before that.  On HRT- followed by OBGYN. Is on testosterone replacement as well as estradiol.    Smoke:No  Family History: mother   Menstrual history/Birthing:   Fractures:in last 10 year- fracture metatarsal after a fall.  Kidney stones: No  GI Surgery:h/o gastric bypass in past in   Duration of therapy: Reclast as noted above  Exercise: Does 42210 steps/day, does lifts weights. Working with .  Diet:   Milk: Minimal   Cheese: Minimal   Ca/Vitamin D: Calcium- 1500 mg/day , vit D 1800 IU/day.  Alcohol:  rare  Eating Disorder: Yes: h/o bulimia as teenager  Steroid Use:  No  PMH/PSH:  Past Medical History:   Diagnosis Date     Esophageal reflux      Osteoporosis 2017    DEXA:  lumbar -2.8, left hip -2.1, right hip -2.5     Status post bariatric surgery     vertical banded gastrosplasty at [facility]     Past Surgical History:   Procedure Laterality Date     CATARACT IOL, RT/LT  2016     Z NONSPECIFIC PROCEDURE      wisdom teeth extraction,abstracted     [facility] NONSPECIFIC PROCEDURE      T&A,abstracted     Z NONSPECIFIC PROCEDURE      2 natural childbirths,abstracted     [facility] NONSPECIFIC PROCEDURE      gastric stapling ast [facility]     Family Hx:  Family History   Problem Relation Age of Onset     Arthritis Father         B:192       Diabetes Father      Respiratory Father      Cardiovascular Father      Hypertension Mother      Alcohol/Drug Mother         B:192 alive     Lung Cancer Mother 89        lung cancer ;  age 91     Family History Negative Sister      Family History Negative Sister      Family History Negative Brother              Social Hx:  Social History     Socioeconomic History     Marital status:      Spouse name: Not on file     Number of children: Not on file     Years of education: Not on file     Highest education level: Not on file   Occupational History     Occupation:    Tobacco Use     Smoking status: Never     Passive exposure: Never      Smokeless tobacco: Never   Vaping Use     Vaping status: Never Used   Substance and Sexual Activity     Alcohol use: Yes     Comment: Increased during giovani - varies- at start quarantine -none now     Drug use: No     Sexual activity: Yes     Partners: Male     Birth control/protection: Post-menopausal, None     Comment: Menopause   Other Topics Concern     Parent/sibling w/ CABG, MI or angioplasty before 65F 55M? Not Asked   Social History Narrative     Not on file     Social Drivers of Health     Financial Resource Strain: Low Risk  (4/30/2025)    Financial Resource Strain      Within the past 12 months, have you or your family members you live with been unable to get utilities (heat, electricity) when it was really needed?: No   Food Insecurity: Low Risk  (4/30/2025)    Food Insecurity      Within the past 12 months, did you worry that your food would run out before you got money to buy more?: No      Within the past 12 months, did the food you bought just not last and you didn t have money to get more?: No   Transportation Needs: Low Risk  (4/30/2025)    Transportation Needs      Within the past 12 months, has lack of transportation kept you from medical appointments, getting your medicines, non-medical meetings or appointments, work, or from getting things that you need?: No   Physical Activity: Sufficiently Active (4/30/2025)    Exercise Vital Sign      Days of Exercise per Week: 6 days      Minutes of Exercise per Session: 90 min   Stress: No Stress Concern Present (4/30/2025)    [employer] - Occupational Stress Questionnaire      Feeling of Stress : Not at all   Social Connections: Unknown (4/30/2025)    Social Connection and Isolation Panel [NHANES]      Frequency of Communication with Friends and Family: Not on file      Frequency of Social Gatherings with Friends and Family: More than three times a week      Attends Bahai Services: Not on file      Active Member of  "Clubs or Organizations: Not on file      Attends Club or Organization Meetings: Not on file      Marital Status: Not on file   Interpersonal Safety: Not on file   Housing Stability: Low Risk  (4/30/2025)    Housing Stability      Do you have housing? : Yes      Are you worried about losing your housing?: No          MEDICATIONS:  has a current medication list which includes the following prescription(s): calcium & phosphate w/ vit d & iron, multiple vitamins-minerals, collagen-vitamin c-biotin, estradiol, famotidine, and cyanocobalamin, and the following Facility-Administered Medications: denosumab and denosumab.    ROS     ROS: 10 point ROS neg other than the symptoms noted above in the HPI.    Physical Exam   VS: /66 (BP Location: Left arm, Patient Position: Chair, Cuff Size: Adult Regular)   Pulse 69   Temp 97.9  F (36.6  C) (Tympanic)   Resp 16   Ht 1.61 m (5' 3.39\")   Wt 62.8 kg (138 lb 8 oz)   LMP 04/17/2009   SpO2 97%   Breastfeeding No   BMI 24.24 kg/m    GENERAL: healthy, alert and no distress  EYES: Eyes grossly normal to inspection, conjunctivae and sclerae normal  ENT: no nose swelling, nasal discharge.  Thyroid: no apparent thyroid nodules  RESP: no audible wheeze, cough, or visible cyanosis.  No visible retractions or increased work of breathing.  Able to speak fully in complete sentences.  ABDO: not evaluated.  EXTREMITIES: no hand tremors.  NEURO: Cranial nerves grossly intact, mentation intact and speech normal  SKIN: No apparent skin lesions, rash or edema seen   PSYCH: mentation appears normal, affect normal/bright, judgement and insight intact, normal speech and appearance well-groomed    LABS:  TFTs:  TSH   Date Value Ref Range Status   04/25/2025 3.67 0.30 - 4.20 uIU/mL Final   06/20/2016 2.89 0.40 - 4.00 mU/L Final       PTH:   ENDO CALCIUM LABS-[facility] Latest Ref Rng & Units 3/19/2018   PARATHYROID HORMONE INTACT 18 - 80 pg/mL 41       Vitamin D:  Vitamin D Deficiency Screening " Results:  Lab Results   Component Value Date    VITDT 47 04/25/2025    VITDT 42 04/25/2024    VITDT 40 11/02/2022    VITDT 38 01/05/2022    VITDT 41 07/29/2019       DEXA 5/2017:  IMPRESSION  Osteoporosis  Degenerative changes of the spine  Recommendations include ensuring adequate daily Calcium and Vitamin D intake  Follow up scan can be considered in three years.      DEXA 5/2018:  IMPRESSION  Osteopenia (low bone mass)  Degenerative changes of the spine  Recommendations include ensuring adequate daily Calcium and Vitamin D intake  Follow up scan can be considered in 3 years.      DEXA 6/2020:  FINDINGS:               Lumbar Spine (L1-L4)      T-score:  -2.0               Left Femoral Neck            T-score:  -1.5               Right Femoral Neck          T-score:  -1.9                             Lumbar (L1-L4) BMD: 0.945  Previous: 0.892                          Total Hip Mean BMD: 0.755  Previous: 0.750     Comparison is made to another DXA performed on the same [employer]  machine on 05/26/2018 and another in 2017.        IMPRESSION  Osteopenia (low bone mass)  Degenerative changes of the spine  Recommendations include ensuring adequate daily Calcium and Vitamin D intake  Follow up scan can be considered in three years.     Comparisons are not necessarily valid when precision within the machine has not been determined. Such a comparison has been performed here; one should interpret with caution.   Compared to previous bone densitometry performed on this patient, there is the suggestion of a possible trend towards improvement of the lumbar spine, and no significant change of the total hip.    DEXA 7/2022:  IMPRESSION  Osteopenia., Degenerative changes of the lumbar spine which may falsely elevate results.   There has been no significant change in bone density of the lumbar spine. There has been no significant change in bone density of the hip(s).       DEXA 7/2024: Low bone density (OSTEOPENIA).   INTERVAL  CHANGE  -There has been a 2.2% decrease in lumbar spine BMD.  -There has been a 1.0% decrease in bilateral hip BMD.    All pertinent notes, labs, and images personally reviewed by me.   All pertinent notes, labs and reports done at outside clinic personally reviewed by me.      A/P  Ms.Nicole JOSETTE Hyman is a 70 year old here for the evaluation of:    #1 Osteoporosis:  Risk factors for low bone density include family history, , female, s/p gastric bypass, low BMI, Estrogen deficiency, low Ca intake.  DEXA 2017 consistent with osteoporosis  She received Reclast since [id number]  DEXA 7/2024: Low bone density (OSTEOPENIA) with decrease in BMD.  On prolia since 2024 and tolerating ok.  Plan:  Discussed diagnosis, pathophysiology, management and treatment options of condition with pt.  Given history of gastric bypass as well as GERD she needs close monioting.  Continue PROLIA. Next PROLIA  8/2025 ( as scheduled), 2/2026  DEXA 7/2026 at [facility].  Follow up after that.    Plan: Calcium, DX Bone Density, denosumab (PROLIA)         injection 60 mg, Calcium, Creatinine, CANCELED:        Vitamin D Deficiency         #2. H/o gastric bypass:  On calcium and  vit D replacement.  Recent labs in range.    #3. HRT:  Followed by outside provider.  Not discussed.    The pt was advised to  Maintain an adequate calcium and vitamin D intake and to supplement vitamin D if needed to maintain serum levels of 25 hydroxy D (25 OH D) between 30-60 ng/ml.  Limit alcohol intake to no more than 2 servings per day.  Limit caffeine intake.  Maintain an active lifestyle including weight-bearing exercises for at least 30 mins daily.  Take measures to reduce the risk of falling.    Possible major side effects of Denosumab (PROLIA) include risk for atypical femur fractures, hypersensitivity, low calcium levels, osteonecrosis of jaw (which can manifest as jaw pain, osteomyelitis, osteitis, bone erosion, tooth/periodontal infection, toothache, gingival  ulceration/erosion). Other s/e include but not limited to Hypertension, Headache and peripheral edema.   Always let your dentist lnow about the medication if plan for major dental procedure.    Indication: Prolia  (denosumab) is a prescription medicine used to treat osteoporosis in patients who:   Are at high risk for fracture, meaning patients who have had a fracture related to osteoporosis, or who have multiple risk factors for fracture   Cannot use another osteoporosis medicine or other osteoporosis medicines did not work well   The timeline for early/late injections would be 4 weeks early and any time after the 6 month robert. If a patient receives their injection late, then the subsequent injection would be 6 months from the date that they actually received the injection      Discussed indications, risks and benefits of all medications prescribed, and answered questions to patient's satisfaction.  The longitudinal plan of care for the diagnosis(es)/condition(s) as documented were addressed during this visit. Due to the added complexity in care, I will continue to support Danika in the subsequent management and with ongoing continuity of care.  All questions were answered.  The patient indicates understanding of the above issues and agrees with the plan set forth.    Follow-up:  As noted in AVS    Fe Reyna MD  Endocrinology  [facility]/[place]  CC: Dexter Floyd    Addendum to above note and clinic visit:    Labs reviewed.    See result note/telephone encounter.      The following steps were completed to comply with the REMS program for Prolia:  Reviewed the serious risks of Prolia  and the symptoms of each risk.  Advised patient to seek prompt medical attention if they have signs or symptoms of any of the serious risks.  Patient will be provided a copy of the Medication Guide and Patient Brochure prior to first injection.    Fe Reyna MDThe following steps were completed to  comply with the REMS program for Prolia:  Reviewed the serious risks of Prolia  and the symptoms of each risk.  Advised patient to seek prompt medical attention if they have signs or symptoms of any of the serious risks.  Patient will be provided a copy of the Medication Guide and Patient Brochure prior to first injection.    Fe Reyna MD    Again, thank you for allowing me to participate in the care of your patient.        Sincerely,        Fe Reyna MD    Electronically signed

## 2025-07-23 NOTE — PROGRESS NOTES
Name: Danika Hyman  Seen for f/u of osteoporosis.   HPI:  Danika Hyman is a 70  year old female who presents for the evaluation of osteoperosis.  Other past medical history includes history of gastric bypass surgery, esophageal reflux disease.    H/o osteopenia- in 50s.  DEXA 2017- osteoporosis  Following that she was seen at [facility].  At that time given her history of GERD and gastric bypass surgery, intravenous therapy was considered. Never been on oral anti-reoprtive therapy.    Started Reclast-- June 2017.  ( Was on Reclast [id number])    DEXA 7/2024: Low bone density (OSTEOPENIA).   INTERVAL CHANGE  -There has been a 2.2% decrease in lumbar spine BMD.  -There has been a 1.0% decrease in bilateral hip BMD.    Switched to PROLIA in 2024.    PROLIA dates: (gets at [facility])  2/2025 8/2024    RECLAST dates:  2023: Reclast on hold  09/09/22 08/20/21 08/07/20 08/01/19 07/20/18 06/23/17     Tolerated well.  No major complaints.  Has started to go to gym and has started weight training.  No fractures or falls.  Plans to retire in 4/2023.    DEXA  2018- showing- suggestion of a possible trend towards improvement of the lumbar spine, and no significant change of the total hip.    DEXA 2020: There is the suggestion of a possible trend towards improvement of the lumbar spine, and no significant change of the total hip.    DEXA 7/2022: Osteopenia, No significant change in bone density of the lumbar spine or hip(s).      RISK FACTORS:  Post-menopausal, Parent history of osteoporosis, Fracture of foot, Condition related to bone loss: gastric bypass, Follow-up osteopenia    H/o Gastric bypass at age 26 years.  Lost 80 lbs at that time and was able to keep it off.  H/o bulimia before that.  On HRT- followed by OBGYN. Is on testosterone replacement as well as estradiol.    Smoke:No  Family History: mother   Menstrual history/Birthing:   Fractures:in last 10 year- fracture metatarsal after a  fall.  Kidney stones: No  GI Surgery:h/o gastric bypass in past in   Duration of therapy: Reclast as noted above  Exercise: Does 62265 steps/day, does lifts weights. Working with .  Diet:   Milk: Minimal   Cheese: Minimal   Ca/Vitamin D: Calcium- 1500 mg/day , vit D 1800 IU/day.  Alcohol:  rare  Eating Disorder: Yes: h/o bulimia as teenager  Steroid Use:  No  PMH/PSH:  Past Medical History:   Diagnosis Date    Esophageal reflux     Osteoporosis 2017    DEXA:  lumbar -2.8, left hip -2.1, right hip -2.5    Status post bariatric surgery 1982    vertical banded gastrosplasty at [facility]     Past Surgical History:   Procedure Laterality Date    CATARACT IOL, RT/LT  2016    Z NONSPECIFIC PROCEDURE      wisdom teeth extraction,abstracted    Z NONSPECIFIC PROCEDURE      T&A,abstracted    Z NONSPECIFIC PROCEDURE      2 natural childbirths,abstracted    [facility] NONSPECIFIC PROCEDURE      gastric stapling ast [facility]     Family Hx:  Family History   Problem Relation Age of Onset    Arthritis Father         B:192      Diabetes Father     Respiratory Father     Cardiovascular Father     Hypertension Mother     Alcohol/Drug Mother         B:1921 alive    Lung Cancer Mother 89        lung cancer ;  age 91    Family History Negative Sister     Family History Negative Sister     Family History Negative Brother              Social Hx:  Social History     Socioeconomic History    Marital status:      Spouse name: Not on file    Number of children: Not on file    Years of education: Not on file    Highest education level: Not on file   Occupational History    Occupation:    Tobacco Use    Smoking status: Never     Passive exposure: Never    Smokeless tobacco: Never   Vaping Use    Vaping status: Never Used   Substance and Sexual Activity    Alcohol use: Yes     Comment: Increased during giovani - varies- at start quarantine -none now    Drug use: No    Sexual activity: Yes     Partners:  Male     Birth control/protection: Post-menopausal, None     Comment: Menopause   Other Topics Concern    Parent/sibling w/ CABG, MI or angioplasty before 65F 55M? Not Asked   Social History Narrative    Not on file     Social Drivers of Health     Financial Resource Strain: Low Risk  (4/30/2025)    Financial Resource Strain     Within the past 12 months, have you or your family members you live with been unable to get utilities (heat, electricity) when it was really needed?: No   Food Insecurity: Low Risk  (4/30/2025)    Food Insecurity     Within the past 12 months, did you worry that your food would run out before you got money to buy more?: No     Within the past 12 months, did the food you bought just not last and you didn t have money to get more?: No   Transportation Needs: Low Risk  (4/30/2025)    Transportation Needs     Within the past 12 months, has lack of transportation kept you from medical appointments, getting your medicines, non-medical meetings or appointments, work, or from getting things that you need?: No   Physical Activity: Sufficiently Active (4/30/2025)    Exercise Vital Sign     Days of Exercise per Week: 6 days     Minutes of Exercise per Session: 90 min   Stress: No Stress Concern Present (4/30/2025)    [employer] [place] [employer] - Occupational Stress Questionnaire     Feeling of Stress : Not at all   Social Connections: Unknown (4/30/2025)    Social Connection and Isolation Panel [NHANES]     Frequency of Communication with Friends and Family: Not on file     Frequency of Social Gatherings with Friends and Family: More than three times a week     Attends Sikh Services: Not on file     Active Member of Clubs or Organizations: Not on file     Attends Club or Organization Meetings: Not on file     Marital Status: Not on file   Interpersonal Safety: Not on file   Housing Stability: Low Risk  (4/30/2025)    Housing Stability     Do you have housing? : Yes     Are you  "worried about losing your housing?: No          MEDICATIONS:  has a current medication list which includes the following prescription(s): calcium & phosphate w/ vit d & iron, multiple vitamins-minerals, collagen-vitamin c-biotin, estradiol, famotidine, and cyanocobalamin, and the following Facility-Administered Medications: denosumab and denosumab.    ROS     ROS: 10 point ROS neg other than the symptoms noted above in the HPI.    Physical Exam   VS: /66 (BP Location: Left arm, Patient Position: Chair, Cuff Size: Adult Regular)   Pulse 69   Temp 97.9  F (36.6  C) (Tympanic)   Resp 16   Ht 1.61 m (5' 3.39\")   Wt 62.8 kg (138 lb 8 oz)   LMP 04/17/2009   SpO2 97%   Breastfeeding No   BMI 24.24 kg/m    GENERAL: healthy, alert and no distress  EYES: Eyes grossly normal to inspection, conjunctivae and sclerae normal  ENT: no nose swelling, nasal discharge.  Thyroid: no apparent thyroid nodules  RESP: no audible wheeze, cough, or visible cyanosis.  No visible retractions or increased work of breathing.  Able to speak fully in complete sentences.  ABDO: not evaluated.  EXTREMITIES: no hand tremors.  NEURO: Cranial nerves grossly intact, mentation intact and speech normal  SKIN: No apparent skin lesions, rash or edema seen   PSYCH: mentation appears normal, affect normal/bright, judgement and insight intact, normal speech and appearance well-groomed    LABS:  TFTs:  TSH   Date Value Ref Range Status   04/25/2025 3.67 0.30 - 4.20 uIU/mL Final   06/20/2016 2.89 0.40 - 4.00 mU/L Final       PTH:   ENDO CALCIUM LABS[facility] Latest Ref Rng & Units 3/19/2018   PARATHYROID HORMONE INTACT 18 - 80 pg/mL 41       Vitamin D:  Vitamin D Deficiency Screening Results:  Lab Results   Component Value Date    VITDT 47 04/25/2025    VITDT 42 04/25/2024    VITDT 40 11/02/2022    VITDT 38 01/05/2022    VITDT 41 07/29/2019       DEXA 5/2017:  IMPRESSION  Osteoporosis  Degenerative changes of the spine  Recommendations include ensuring " adequate daily Calcium and Vitamin D intake  Follow up scan can be considered in three years.      DEXA 5/2018:  IMPRESSION  Osteopenia (low bone mass)  Degenerative changes of the spine  Recommendations include ensuring adequate daily Calcium and Vitamin D intake  Follow up scan can be considered in 3 years.      DEXA 6/2020:  FINDINGS:               Lumbar Spine (L1-L4)      T-score:  -2.0               Left Femoral Neck            T-score:  -1.5               Right Femoral Neck          T-score:  -1.9                             Lumbar (L1-L4) BMD: 0.945  Previous: 0.892                          Total Hip Mean BMD: 0.755  Previous: 0.750     Comparison is made to another DXA performed on the same [employer]  machine on 05/26/2018 and another in 2017.        IMPRESSION  Osteopenia (low bone mass)  Degenerative changes of the spine  Recommendations include ensuring adequate daily Calcium and Vitamin D intake  Follow up scan can be considered in three years.     Comparisons are not necessarily valid when precision within the machine has not been determined. Such a comparison has been performed here; one should interpret with caution.   Compared to previous bone densitometry performed on this patient, there is the suggestion of a possible trend towards improvement of the lumbar spine, and no significant change of the total hip.    DEXA 7/2022:  IMPRESSION  Osteopenia., Degenerative changes of the lumbar spine which may falsely elevate results.   There has been no significant change in bone density of the lumbar spine. There has been no significant change in bone density of the hip(s).       DEXA 7/2024: Low bone density (OSTEOPENIA).   INTERVAL CHANGE  -There has been a 2.2% decrease in lumbar spine BMD.  -There has been a 1.0% decrease in bilateral hip BMD.    All pertinent notes, labs, and images personally reviewed by me.   All pertinent notes, labs and reports done at outside clinic personally reviewed by  me.      A/P  Ms.Nicole JOSETTE Hyman is a 70 year old here for the evaluation of:    #1 Osteoporosis:  Risk factors for low bone density include family history, , female, s/p gastric bypass, low BMI, Estrogen deficiency, low Ca intake.  DEXA 2017 consistent with osteoporosis  She received Reclast since 5020-0484  DEXA 7/2024: Low bone density (OSTEOPENIA) with decrease in BMD.  On prolia since 2024 and tolerating ok.  Plan:  Discussed diagnosis, pathophysiology, management and treatment options of condition with pt.  Given history of gastric bypass as well as GERD she needs close monioting.  Continue PROLIA. Next PROLIA  8/2025 ( as scheduled), 2/2026  DEXA 7/2026 at [facility].  Follow up after that.    Plan: Calcium, DX Bone Density, denosumab (PROLIA)         injection 60 mg, Calcium, Creatinine, CANCELED:        Vitamin D Deficiency         #2. H/o gastric bypass:  On calcium and  vit D replacement.  Recent labs in range.    #3. HRT:  Followed by outside provider.  Not discussed.    The pt was advised to  Maintain an adequate calcium and vitamin D intake and to supplement vitamin D if needed to maintain serum levels of 25 hydroxy D (25 OH D) between 30-60 ng/ml.  Limit alcohol intake to no more than 2 servings per day.  Limit caffeine intake.  Maintain an active lifestyle including weight-bearing exercises for at least 30 mins daily.  Take measures to reduce the risk of falling.    Possible major side effects of Denosumab (PROLIA) include risk for atypical femur fractures, hypersensitivity, low calcium levels, osteonecrosis of jaw (which can manifest as jaw pain, osteomyelitis, osteitis, bone erosion, tooth/periodontal infection, toothache, gingival ulceration/erosion). Other s/e include but not limited to Hypertension, Headache and peripheral edema.   Always let your dentist lnow about the medication if plan for major dental procedure.    Indication: Prolia  (denosumab) is a prescription medicine used to treat  osteoporosis in patients who:   Are at high risk for fracture, meaning patients who have had a fracture related to osteoporosis, or who have multiple risk factors for fracture   Cannot use another osteoporosis medicine or other osteoporosis medicines did not work well   The timeline for early/late injections would be 4 weeks early and any time after the 6 month robert. If a patient receives their injection late, then the subsequent injection would be 6 months from the date that they actually received the injection      Discussed indications, risks and benefits of all medications prescribed, and answered questions to patient's satisfaction.  The longitudinal plan of care for the diagnosis(es)/condition(s) as documented were addressed during this visit. Due to the added complexity in care, I will continue to support Danika in the subsequent management and with ongoing continuity of care.  All questions were answered.  The patient indicates understanding of the above issues and agrees with the plan set forth.    Follow-up:  As noted in AVS    Fe Reyna MD  Endocrinology  [facility]  CC: Dexter Floyd    Addendum to above note and clinic visit:    Labs reviewed.    See result note/telephone encounter.      The following steps were completed to comply with the REMS program for Prolia:  Reviewed the serious risks of Prolia  and the symptoms of each risk.  Advised patient to seek prompt medical attention if they have signs or symptoms of any of the serious risks.  Patient will be provided a copy of the Medication Guide and Patient Brochure prior to first injection.    Fe Reyna MDThe following steps were completed to comply with the REMS program for Prolia:  Reviewed the serious risks of Prolia  and the symptoms of each risk.  Advised patient to seek prompt medical attention if they have signs or symptoms of any of the serious risks.  Patient will be provided a copy of the  Medication Guide and Patient Brochure prior to first injection.    Fe Reyna MD

## 2025-08-11 ENCOUNTER — LAB (OUTPATIENT)
Dept: LAB | Facility: CLINIC | Age: 70
End: 2025-08-11
Payer: MEDICARE

## 2025-08-11 DIAGNOSIS — Z92.29 HISTORY OF BISPHOSPHONATE THERAPY: ICD-10-CM

## 2025-08-11 DIAGNOSIS — M81.0 AGE-RELATED OSTEOPOROSIS WITHOUT CURRENT PATHOLOGICAL FRACTURE: ICD-10-CM

## 2025-08-11 DIAGNOSIS — M89.9 BONE DISEASE: ICD-10-CM

## 2025-08-11 DIAGNOSIS — Z98.84 STATUS POST BARIATRIC SURGERY: ICD-10-CM

## 2025-08-11 DIAGNOSIS — M81.0 OSTEOPOROSIS, UNSPECIFIED OSTEOPOROSIS TYPE, UNSPECIFIED PATHOLOGICAL FRACTURE PRESENCE: ICD-10-CM

## 2025-08-11 DIAGNOSIS — Z92.29 PERSONAL HISTORY OF OTHER DRUG THERAPY: ICD-10-CM

## 2025-08-11 DIAGNOSIS — Z78.0 ASYMPTOMATIC MENOPAUSAL STATE: ICD-10-CM

## 2025-08-11 DIAGNOSIS — K21.9 GASTROESOPHAGEAL REFLUX DISEASE WITHOUT ESOPHAGITIS: ICD-10-CM

## 2025-08-11 LAB
CALCIUM SERPL-MCNC: 9.5 MG/DL (ref 8.8–10.4)
CREAT SERPL-MCNC: 0.93 MG/DL (ref 0.51–0.95)
EGFRCR SERPLBLD CKD-EPI 2021: 67 ML/MIN/1.73M2

## 2025-08-11 PROCEDURE — 82565 ASSAY OF CREATININE: CPT

## 2025-08-11 PROCEDURE — 36415 COLL VENOUS BLD VENIPUNCTURE: CPT

## 2025-08-11 PROCEDURE — 82310 ASSAY OF CALCIUM: CPT

## 2025-08-18 ENCOUNTER — ALLIED HEALTH/NURSE VISIT (OUTPATIENT)
Dept: PEDIATRICS | Facility: CLINIC | Age: 70
End: 2025-08-18
Payer: MEDICARE

## 2025-08-18 DIAGNOSIS — M81.0 OSTEOPOROSIS, UNSPECIFIED OSTEOPOROSIS TYPE, UNSPECIFIED PATHOLOGICAL FRACTURE PRESENCE: Primary | ICD-10-CM

## 2025-08-18 PROCEDURE — 96372 THER/PROPH/DIAG INJ SC/IM: CPT | Performed by: INTERNAL MEDICINE

## 2025-08-18 PROCEDURE — 99207 PR NO CHARGE NURSE ONLY: CPT
